# Patient Record
Sex: MALE | Race: WHITE | NOT HISPANIC OR LATINO | Employment: OTHER | URBAN - NONMETROPOLITAN AREA
[De-identification: names, ages, dates, MRNs, and addresses within clinical notes are randomized per-mention and may not be internally consistent; named-entity substitution may affect disease eponyms.]

---

## 2017-01-06 ENCOUNTER — OFFICE VISIT - GICH (OUTPATIENT)
Dept: ORTHOPEDICS | Facility: OTHER | Age: 52
End: 2017-01-06

## 2017-01-06 ENCOUNTER — HOSPITAL ENCOUNTER (OUTPATIENT)
Dept: RADIOLOGY | Facility: OTHER | Age: 52
End: 2017-01-06
Attending: ORTHOPAEDIC SURGERY

## 2017-01-06 ENCOUNTER — HISTORY (OUTPATIENT)
Dept: ORTHOPEDICS | Facility: OTHER | Age: 52
End: 2017-01-06

## 2017-01-06 DIAGNOSIS — S62.647D NONDISPLACED FRACTURE OF PROXIMAL PHALANX OF LEFT LITTLE FINGER WITH ROUTINE HEALING: ICD-10-CM

## 2017-01-06 DIAGNOSIS — S62.323D: ICD-10-CM

## 2017-04-10 ENCOUNTER — HISTORY (OUTPATIENT)
Dept: EMERGENCY MEDICINE | Facility: OTHER | Age: 52
End: 2017-04-10

## 2017-06-20 ENCOUNTER — OFFICE VISIT - GICH (OUTPATIENT)
Dept: FAMILY MEDICINE | Facility: OTHER | Age: 52
End: 2017-06-20

## 2017-06-20 ENCOUNTER — HISTORY (OUTPATIENT)
Dept: FAMILY MEDICINE | Facility: OTHER | Age: 52
End: 2017-06-20

## 2017-06-20 DIAGNOSIS — K52.9 NONINFECTIVE GASTROENTERITIS AND COLITIS: ICD-10-CM

## 2017-06-20 DIAGNOSIS — R91.1 SOLITARY PULMONARY NODULE: ICD-10-CM

## 2017-06-20 DIAGNOSIS — F33.1 MAJOR DEPRESSIVE DISORDER, RECURRENT, MODERATE (H): ICD-10-CM

## 2017-06-20 DIAGNOSIS — I10 ESSENTIAL (PRIMARY) HYPERTENSION: ICD-10-CM

## 2017-06-20 DIAGNOSIS — F41.9 ANXIETY DISORDER: ICD-10-CM

## 2017-06-20 DIAGNOSIS — F43.29 ADJUSTMENT DISORDER WITH OTHER SYMPTOMS: ICD-10-CM

## 2017-06-20 LAB
A/G RATIO - HISTORICAL: 1.5 (ref 1–2)
ABSOLUTE BASOPHILS - HISTORICAL: 0.1 THOU/CU MM
ABSOLUTE EOSINOPHILS - HISTORICAL: 0.1 THOU/CU MM
ABSOLUTE IMMATURE GRANULOCYTES(METAS,MYELOS,PROS) - HISTORICAL: 0 THOU/CU MM
ABSOLUTE LYMPHOCYTES - HISTORICAL: 3.3 THOU/CU MM (ref 0.9–2.9)
ABSOLUTE MONOCYTES - HISTORICAL: 0.8 THOU/CU MM
ABSOLUTE NEUTROPHILS - HISTORICAL: 5.5 THOU/CU MM (ref 1.7–7)
ALBUMIN SERPL-MCNC: 4.4 G/DL (ref 3.5–5.7)
ALP SERPL-CCNC: 101 IU/L (ref 34–104)
ALT (SGPT) - HISTORICAL: 29 IU/L (ref 7–52)
ANION GAP - HISTORICAL: 9 (ref 5–18)
AST SERPL-CCNC: 28 IU/L (ref 13–39)
BASOPHILS # BLD AUTO: 0.7 %
BILIRUB SERPL-MCNC: 0.5 MG/DL (ref 0.3–1)
BUN SERPL-MCNC: 9 MG/DL (ref 7–25)
BUN/CREAT RATIO - HISTORICAL: 10
CALCIUM SERPL-MCNC: 10 MG/DL (ref 8.6–10.3)
CHLORIDE SERPLBLD-SCNC: 98 MMOL/L (ref 98–107)
CO2 SERPL-SCNC: 27 MMOL/L (ref 21–31)
CREAT SERPL-MCNC: 0.88 MG/DL (ref 0.7–1.3)
EOSINOPHIL NFR BLD AUTO: 0.9 %
ERYTHROCYTE [DISTWIDTH] IN BLOOD BY AUTOMATED COUNT: 13.5 % (ref 11.5–15.5)
GFR IF NOT AFRICAN AMERICAN - HISTORICAL: >60 ML/MIN/1.73M2
GLOBULIN - HISTORICAL: 2.9 G/DL (ref 2–3.7)
GLUCOSE SERPL-MCNC: 104 MG/DL (ref 70–105)
HCT VFR BLD AUTO: 53.9 % (ref 37–53)
HEMOGLOBIN: 19.3 G/DL (ref 13.5–17.5)
IMMATURE GRANULOCYTES(METAS,MYELOS,PROS) - HISTORICAL: 0.3 %
LYMPHOCYTES NFR BLD AUTO: 33.8 % (ref 20–44)
MCH RBC QN AUTO: 35 PG (ref 26–34)
MCHC RBC AUTO-ENTMCNC: 35.8 G/DL (ref 32–36)
MCV RBC AUTO: 98 FL (ref 80–100)
MONOCYTES NFR BLD AUTO: 8.5 %
NEUTROPHILS NFR BLD AUTO: 55.8 % (ref 42–72)
PLATELET # BLD AUTO: 228 THOU/CU MM (ref 140–440)
PMV BLD: 9.2 FL (ref 6.5–11)
POTASSIUM SERPL-SCNC: 4.6 MMOL/L (ref 3.5–5.1)
PROT SERPL-MCNC: 7.3 G/DL (ref 6.4–8.9)
RED BLOOD COUNT - HISTORICAL: 5.52 MIL/CU MM (ref 4.3–5.9)
SODIUM SERPL-SCNC: 134 MMOL/L (ref 133–143)
WHITE BLOOD COUNT - HISTORICAL: 9.9 THOU/CU MM (ref 4.5–11)

## 2017-07-31 ENCOUNTER — HOSPITAL ENCOUNTER (OUTPATIENT)
Dept: RADIOLOGY | Facility: OTHER | Age: 52
End: 2017-07-31
Attending: FAMILY MEDICINE

## 2017-07-31 DIAGNOSIS — R91.1 SOLITARY PULMONARY NODULE: ICD-10-CM

## 2017-08-01 ENCOUNTER — AMBULATORY - GICH (OUTPATIENT)
Dept: FAMILY MEDICINE | Facility: OTHER | Age: 52
End: 2017-08-01

## 2017-08-07 ENCOUNTER — COMMUNICATION - GICH (OUTPATIENT)
Dept: FAMILY MEDICINE | Facility: OTHER | Age: 52
End: 2017-08-07

## 2017-12-27 NOTE — PROGRESS NOTES
Patient Information     Patient Name Lazaro Bunch [id number] Male 1965      Progress Notes by Xavi Gonzales MD at 2017  2:00 PM     Author:  Xavi Gonzales MD Service:  (none) Author Type:  Physician     Filed:  2017  2:59 PM Encounter Date:  2017 Status:  Signed     :  Xavi Gonzales MD (Physician)            SUBJECTIVE:  52 y.o. male who presents for medication refill. He has been off of Celexa and alprazolam for several months. He was doing well since December, and then he's noted a gradual slipping into more depression and anxiety. He was taking one alprazolam in the morning along with the Celexa. He is interested in getting back on that. He has not been suicidal. He's been under a considerable amount of stress because he is unable to work due to chronic diarrhea from his perforated ulcer surgery in , along with the depression and anxiety.    He continues to smoke. He said he smoking considerably less that he has in the past. Right now he is on no medications.    He has had some headaches which occur in the middle of the night almost every night. They're behind his left eye. Putting ice on it helps. He said it feels like headaches that he's had in the past.    Additional Review of Systems: see HPI:  No neurologic symptoms other than the headache, and no cardiopulmonary symptoms.    Past Medical History:     Diagnosis  Date     Closed displaced fracture of shaft of third metacarpal bone of left hand 2016     Closed nondisplaced fracture of proximal phalanx of left little finger 2016     Perforated chronic stomach ulcer (HC) 09        Current Outpatient Prescriptions       Medication  Sig Dispense Refill     ALPRAZolam (XANAX) 0.5 mg tablet One tablet po Q AM 30 tablet 5     citalopram (CELEXA) 20 mg tablet Take 1 tablet by mouth once daily. 30 tablet 5     lisinopril-hydrochlorothiazide (10-12.5 mg) tablet (PRINZIDE; ZESTORETIC)  Take 1 tablet by mouth once daily. 30 tablet 12     nicotine 7 mg/24 hr (NICODERM; HABITROL) 7 mg/24 hr patch Apply 1 Patch on dry, clean, hairless skin once daily. 14 Patch 2     No current facility-administered medications for this visit.      Medications have been reviewed by me and are current to the best of my knowledge and ability.      Allergies as of 06/20/2017      (No Known Allergies)        OBJECTIVE:  BP (!) 164/108  Pulse (!) 104  Ht 1.829 m (6')  Wt 108 kg (238 lb)  BMI 32.28 kg/m2  EXAM:  EXAM:  General Appearance: Pleasant, alert, appropriate appearance for age. No acute distress  Head Exam: Normal. Normocephalic, atraumatic.  Eye Exam: Normal external eye, conjunctiva, lids, cornea. RAMANDEEP.  Neck Exam: Supple, no masses or nodes., Good carotid pulses, no bruits heard  Thyroid Exam: No nodules or enlargement.  Chest/Respiratory Exam: Normal chest wall and respirations. Clear to auscultation.  Cardiovascular Exam: Regular rate and rhythm. S1, S2, no murmur, click, gallop, or rubs.  Repeat blood pressure by me was 166/112  Neurologic Exam: Intact and nonfocal  Psychiatric Exam: Alert and oriented, mildly depressed anxious affect. No delusions or hallucinations. Not suicidal.  Laboratory studies pending  CT report from 2012 reviewed    ASSESSMENT/PLAN:  Headache which is likely related to hypertension. He did have a lung nodule on his CT scan in 2012 and so this should be repeated as well. He was started on lisinopril/HCT for the blood pressure and we will review the CT scan once it is available.    Depression and anxiety-refill Celexa and alprazolam. Also laboratory studies done, he'll be notified of the results. Follow-up again in 2-3 weeks for blood pressure recheck and review of lab work. Forms are completed for public assistance with disability due to chronic diarrhea and his psychiatric status.  Xavi Gonzales MD ....................  6/20/2017   2:58 PM

## 2017-12-28 NOTE — TELEPHONE ENCOUNTER
Patient Information     Patient Name MRN Lazaro Thakur [id number] Male 1965      Telephone Encounter by Ava Georges at 2017  3:09 PM     Author:  Ava Georges Service:  (none) Author Type:  (none)     Filed:  2017  3:11 PM Encounter Date:  2017 Status:  Signed     :  Ava Georges            No answer, no voicemail.    Ava Georges ....................  2017   3:11 PM

## 2017-12-28 NOTE — TELEPHONE ENCOUNTER
Patient Information     Patient Name MRN Lazaro Thakur [id number] Male 1965      Telephone Encounter by Lisa Lira at 2017  9:44 AM     Author:  Lisa Lira Service:  (none) Author Type:  (none)     Filed:  2017  9:45 AM Encounter Date:  2017 Status:  Signed     :  Lisa Lira            Writer read back letter that was sent out with the results of CT scan  Lisa Lira LPN 2017 9:45 AM

## 2017-12-28 NOTE — PROGRESS NOTES
Patient Information     Patient Name MRN Sex     Lazaro Chauhan [id number] Male 1965      Progress Notes signed by Geoff Mendez MD at 2017 10:05 AM      Author:  Geoff Mendez MD Service:  (none) Author Type:  Physician     Filed:  2017 10:05 AM Encounter Date:  2017 Status:  Signed     :  Geoff Mendez MD (Physician)            -  2017        LAZARO CHAUHAN  [address] [place] [address]      RE:  LAZARO CHAUHAN  MR#:  [id number]  :  1965    Dear Mr. Chauhan:    The results of your CT scan of the chest showed no evidence of any nodules at all. You do have rib fractures as you know. There are no abnormalities otherwise. This is certainly good news and you do not need to have the CT repeated.     Sincerely yours,      GEOFF MENDEZ MD    WR/cp  Voice Job ID: [id number]  Text Job ID:  [id number]    cc:  2017  RE:  LAZARO CHAUHAN  MR#:  [id number]  Page 1

## 2017-12-28 NOTE — PROGRESS NOTES
Patient Information     Patient Name MRN Sex Lazaro Persaud [id number] Male 1965      Progress Notes signed by Geoff Mendez MD at 2017  8:32 AM      Author:  Geoff Mendez MD Service:  (none) Author Type:  Physician     Filed:  2017  8:32 AM Encounter Date:  2017 Status:  Signed     :  Geoff Mendez MD (Physician)            -  2017        LAZARO MAN  [address] [place] [address]      RE:  LAZARO MAN  MR#:  [id number]  :  1965    Dear Mr. Man:    The results of the lab work that we did today turned out looking relatively well with one exception. We did a blood profile which looked at your blood sugar, liver and kidney function and all your electrolytes and this was entirely normal. I am enclosing a copy of this for your review. I also did a complete blood count to see if there was any sign of infection or anemia and on that test your hemoglobin came back elevated at 19.3. A high hemoglobin is the opposite of anemia, and this can mean either that the blood is too concentrated, or that your oxygen levels are running low. Because of this, getting the CT scan of the lungs is important and then, as we discussed, we should see you back in followup in 2 or 3 weeks at which time we can check your blood oxygen level. At the same time, we will repeat the hemoglobin to see if it is still elevated.     This is not a real serious problem, but just needs to have the followup as noted. Again, the CT scan will be done and the followup with me in 2 to 3 weeks. We will see you at that time and recheck some of the labs.       Sincerely,      GEOFF MENDEZ MD    WR/sw  Voice Job ID: [id number]  Text Job ID:  [id number]    cc:Comprehensive metabolic panel done 2017.     2017  RE:  LAZARO MAN  MR#:  [id number]  Page 1

## 2017-12-30 NOTE — NURSING NOTE
Patient Information     Patient Name MRLazaro Valenzuela [id number] Male 1965      Nursing Note by Ava Georges at 2017  2:00 PM     Author:  Ava Georges Service:  (none) Author Type:  (none)     Filed:  2017  2:21 PM Encounter Date:  2017 Status:  Signed     :  Ava Georges            Patient presents to the clinic today for a med check.  Ava Georges ....................  2017   2:06 PM

## 2018-01-02 NOTE — PROGRESS NOTES
Patient Information     Patient Name MRN Sex Lazaro Persaud [id number] Male 1965      Progress Notes by Rufus Jorge DO at 2017 11:15 AM     Author:  Rufus Jorge DO Service:  (none) Author Type:  Physician     Filed:  2017 12:03 PM Encounter Date:  2017 Status:  Signed     :  Rufus Jorge DO (Physician)            PROGRESS NOTE    SUBJECTIVE:  Lazaro Man is here for evaluation in regards to his left hand. He is in very little discomfort except for the tightness due to having the cast.    OBJECTIVE:  Visit Vitals       /98     Pulse 88     Resp 18    There is no height or weight on file to calculate BMI.    General Appearance: Pleasant male in good appearance, mood and affect.  Alert and orientated times three ( time, date and location).    Skin: Abnormal, slight swelling.  Sensation is Normal in the fingertips.    Hand:  Thenar wasting: no  Hypothenar wasting: no  Sensation: Normal  Radial and ulnar blood flow:  Normal  Tinel's test negative  Minimal tenderness over the third metacarpal on the left.    Shoulder:  Motion: full    Elbow:  Flexion: Normal  Extension: Normal    Eyes: pupils are round.    Ears: Hearing: Intact    Heart: normal    Lungs: Coarse breath sounds.     Radiographic images from , ,  where independently reviewed and discussed with the patient.      Xray:    There is no increased fracture callus of the left third metacarpal shaft. Alignment is appropriate.  The fracture the base of the fifth metacarpal is healing well also with callus formation.    Exam: XR HAND 3 VIEWS LEFT  History: Closed displaced fracture of shaft of third metacarpal bone of left hand with routine healing, subsequent encounter  Technique: 3 views.  Findings: Comparison is made with the previous exam dated 2016.  There is an oblique fracture through the mid shaft of the third metacarpal with increasing callus formation at the  fracture site. There is been no significant change in position or alignment and no new abnormality is seen.    Impression: Healing third metacarpal fracture.  Electronically Signed By: Shabnam Mahan M.D. on 12/13/2016 9:00 AM    PROCEDURE: XR HAND 3 VIEWS LEFT  HISTORY: Acute left hand injury.  COMPARISON: None.  TECHNIQUE: 3 views of the left hand were obtained.  FINDINGS: There is an oblique slightly comminuted fracture of the shaft of the third metacarpal there is minimal posterior angulation and minimal less than 2 mm displacement only seen on the lateral view. No other fracture is seen. The joint spaces are preserved. No retained foreign body is seen. There are degenerative changes at the first CMC joint.  IMPRESSION: Acute third metacarpal fracture.  Electronically Signed By: Shay Jones on 11/25/2016 8:44 AM    IMPRESSION:  Left third metacarpal shaft fracture (DOI 11/19/16).  Noted new fracture base of the proximal phalanx left small finger.    PLAN:  Risks, benefits, conservative, surgical and alternatives to treatment where discussed and the patient would like to proceed with conservative measures.  He will begin working on range of motion exercises but no heavy lifting or gripping for at least another week..  He needs to rest, ice, and elevate his hand.  He will follow-up as needed.  All questions and concerns answered.  In regards to the new fracture that is noted this will be covered in the cast that is going to be applied.    Rufus Jorge D.O.  Orthopaedic Surgeon    [facility]  [address] [place] [address]  Phone [phone] (KNEE)  Fax [phone]    This document was created using computer generated templates and voice activated software.    12:00 PM 1/6/2017

## 2018-01-02 NOTE — NURSING NOTE
Patient Information     Patient Name MRN Sex Lazaro Persaud [id number] Male 1965      Nursing Note by Alida Rice at 2017 11:15 AM     Author:  Alida Rice Service:  (none) Author Type:  (none)     Filed:  2017 11:43 AM Encounter Date:  2017 Status:  Signed     :  Alida Rice            Pt presents for follow up. Left hand 2nd finger fx, doi     Alida Rice CMA 2017 11:43 AM

## 2018-01-08 ENCOUNTER — HISTORY (OUTPATIENT)
Dept: FAMILY MEDICINE | Facility: OTHER | Age: 53
End: 2018-01-08

## 2018-01-08 ENCOUNTER — AMBULATORY - GICH (OUTPATIENT)
Dept: FAMILY MEDICINE | Facility: OTHER | Age: 53
End: 2018-01-08

## 2018-01-08 ENCOUNTER — HOSPITAL ENCOUNTER (OUTPATIENT)
Dept: RADIOLOGY | Facility: OTHER | Age: 53
End: 2018-01-08
Attending: NURSE PRACTITIONER

## 2018-01-08 ENCOUNTER — OFFICE VISIT - GICH (OUTPATIENT)
Dept: FAMILY MEDICINE | Facility: OTHER | Age: 53
End: 2018-01-08

## 2018-01-08 DIAGNOSIS — S49.91XA UNSPECIFIED INJURY OF RIGHT SHOULDER AND UPPER ARM, INITIAL ENCOUNTER: ICD-10-CM

## 2018-01-08 DIAGNOSIS — S42.291A OTHER DISPLACED FRACTURE OF UPPER END OF RIGHT HUMERUS, INITIAL ENCOUNTER FOR CLOSED FRACTURE: ICD-10-CM

## 2018-01-11 ENCOUNTER — AMBULATORY - GICH (OUTPATIENT)
Dept: OTHER | Facility: OTHER | Age: 53
End: 2018-01-11

## 2018-01-11 ENCOUNTER — HISTORY (OUTPATIENT)
Dept: OTHER | Facility: OTHER | Age: 53
End: 2018-01-11

## 2018-01-12 ENCOUNTER — HOSPITAL ENCOUNTER (OUTPATIENT)
Dept: RADIOLOGY | Facility: OTHER | Age: 53
End: 2018-01-12
Attending: NURSE PRACTITIONER

## 2018-01-12 DIAGNOSIS — S42.291A OTHER DISPLACED FRACTURE OF UPPER END OF RIGHT HUMERUS, INITIAL ENCOUNTER FOR CLOSED FRACTURE: ICD-10-CM

## 2018-01-12 DIAGNOSIS — S49.91XA UNSPECIFIED INJURY OF RIGHT SHOULDER AND UPPER ARM, INITIAL ENCOUNTER: ICD-10-CM

## 2018-01-15 ENCOUNTER — OFFICE VISIT - GICH (OUTPATIENT)
Dept: ORTHOPEDICS | Facility: OTHER | Age: 53
End: 2018-01-15

## 2018-01-15 ENCOUNTER — HISTORY (OUTPATIENT)
Dept: ORTHOPEDICS | Facility: OTHER | Age: 53
End: 2018-01-15

## 2018-01-15 DIAGNOSIS — S42.291D OTHER DISPLACED FRACTURE OF UPPER END OF RIGHT HUMERUS, SUBSEQUENT ENCOUNTER FOR FRACTURE WITH ROUTINE HEALING: ICD-10-CM

## 2018-01-15 DIAGNOSIS — S42.291A OTHER DISPLACED FRACTURE OF UPPER END OF RIGHT HUMERUS, INITIAL ENCOUNTER FOR CLOSED FRACTURE: ICD-10-CM

## 2018-01-15 DIAGNOSIS — S49.91XA UNSPECIFIED INJURY OF RIGHT SHOULDER AND UPPER ARM, INITIAL ENCOUNTER: ICD-10-CM

## 2018-01-18 ENCOUNTER — AMBULATORY - GICH (OUTPATIENT)
Dept: ORTHOPEDICS | Facility: OTHER | Age: 53
End: 2018-01-18

## 2018-01-18 DIAGNOSIS — S42.291D OTHER DISPLACED FRACTURE OF UPPER END OF RIGHT HUMERUS, SUBSEQUENT ENCOUNTER FOR FRACTURE WITH ROUTINE HEALING: ICD-10-CM

## 2018-01-25 VITALS — DIASTOLIC BLOOD PRESSURE: 98 MMHG | HEART RATE: 88 BPM | SYSTOLIC BLOOD PRESSURE: 140 MMHG | RESPIRATION RATE: 18 BRPM

## 2018-01-25 VITALS
HEART RATE: 104 BPM | DIASTOLIC BLOOD PRESSURE: 108 MMHG | SYSTOLIC BLOOD PRESSURE: 164 MMHG | WEIGHT: 238 LBS | HEIGHT: 72 IN | BODY MASS INDEX: 32.23 KG/M2

## 2018-01-31 ENCOUNTER — DOCUMENTATION ONLY (OUTPATIENT)
Dept: FAMILY MEDICINE | Facility: OTHER | Age: 53
End: 2018-01-31

## 2018-01-31 PROBLEM — Z80.0 FAMILY HISTORY OF MALIGNANT NEOPLASM OF GASTROINTESTINAL TRACT: Status: ACTIVE | Noted: 2018-01-31

## 2018-01-31 PROBLEM — S42.201D CLOSED FRACTURE OF PROXIMAL END OF RIGHT HUMERUS WITH ROUTINE HEALING: Status: ACTIVE | Noted: 2018-01-11

## 2018-01-31 PROBLEM — Z87.11 HISTORY OF PEPTIC ULCER DISEASE: Status: ACTIVE | Noted: 2018-01-31

## 2018-01-31 PROBLEM — I10 HYPERTENSION: Status: ACTIVE | Noted: 2017-06-20

## 2018-01-31 RX ORDER — ALPRAZOLAM 0.5 MG
1 TABLET ORAL EVERY MORNING
COMMUNITY
Start: 2017-06-20 | End: 2018-05-03

## 2018-01-31 RX ORDER — LISINOPRIL/HYDROCHLOROTHIAZIDE 10-12.5 MG
1 TABLET ORAL DAILY
COMMUNITY
Start: 2017-06-20 | End: 2018-08-14

## 2018-01-31 RX ORDER — OXYCODONE AND ACETAMINOPHEN 5; 325 MG/1; MG/1
1 TABLET ORAL
COMMUNITY
Start: 2018-01-08 | End: 2018-09-06

## 2018-01-31 RX ORDER — CITALOPRAM HYDROBROMIDE 20 MG/1
20 TABLET ORAL DAILY
COMMUNITY
Start: 2017-06-20 | End: 2018-08-14

## 2018-02-09 VITALS
DIASTOLIC BLOOD PRESSURE: 88 MMHG | SYSTOLIC BLOOD PRESSURE: 138 MMHG | HEIGHT: 72 IN | HEART RATE: 60 BPM | WEIGHT: 231 LBS | BODY MASS INDEX: 31.29 KG/M2

## 2018-02-09 VITALS
TEMPERATURE: 98.3 F | DIASTOLIC BLOOD PRESSURE: 82 MMHG | BODY MASS INDEX: 31.34 KG/M2 | HEIGHT: 72 IN | WEIGHT: 231.38 LBS | HEART RATE: 108 BPM | SYSTOLIC BLOOD PRESSURE: 144 MMHG

## 2018-02-12 NOTE — NURSING NOTE
Patient Information     Patient Name MRN Sex Lazaro Persaud [id number] Male 1965      Nursing Note by Mica Ruby at 2018  2:00 PM     Author:  Mica Ruby Service:  (none) Author Type:  (none)     Filed:  2018  2:27 PM Encounter Date:  2018 Status:  Signed     :  Mica Ruby            Patient presents to the clinic for right shoulder pain x 4 days. Patient reports falling on ice on Friday and landing on his right side. Patient reports the shoulder increasing in pain and has iced it for relief.  Mica HUGO, KT..AAMA.....2018..2:16 PM

## 2018-02-12 NOTE — PATIENT INSTRUCTIONS
Patient Information     Patient Name MRN Lazaro Thakur [id number] Male 1965      Patient Instructions by Erin Mackenzie NP at 2018  2:00 PM     Author:  Erin Mackenzie NP Service:  (none) Author Type:  PHYS- Nurse Practitioner     Filed:  2018  3:34 PM Encounter Date:  2018 Status:  Signed     :  Erin Mackenzie NP (PHYS- Nurse Practitioner)            Rest the arm, avoid any activity which causes pain.    Apply cold packs to the affected area for 15-20 minutes, 4 times a day. A bag of frozen corn or peas often works well as a cold pack. A cold pack is usually the best treatment for the 1st 2 days after an injury. After 48 hours, apply heat or ice, whichever gives relief.    Shoulder immobilizer on at all times.     Elevate the injured area as much as you are able. If you can get this higher than the heart, this will help minimize pain and swelling.    Also, Take ibuprofen (Advil, Motrin) or naproxen (Aleve), or a similar prescription medication. Use regularly for the first 7-10 days. Later, take as needed for pain, swelling or stiffness. Take this type of medication with food to minimize any stomach irritation. Tylenol may also be taken to help ease the pain.    Call or return to clinic as needed if your pain becomes significantly worse, or fails to improve as anticipated despite following the above recommendations.

## 2018-02-12 NOTE — PROGRESS NOTES
Patient Information     Patient Name MRN Sex Lazaro Persaud [id number] Male 1965      Progress Notes by Erin Mackenzie NP at 2018  2:00 PM     Author:  Erin Mackenzie NP Service:  (none) Author Type:  PHYS- Nurse Practitioner     Filed:  2018  4:08 PM Encounter Date:  2018 Status:  Signed     :  Erin Mackenzie NP (PHYS- Nurse Practitioner)            Nursing Notes:   Mica Ruby  2018  2:27 PM  Signed  Patient presents to the clinic for right shoulder pain x 4 days. Patient reports falling on ice on Friday and landing on his right side. Patient reports the shoulder increasing in pain and has iced it for relief.  Miac HUGO CMA..AAMA.....2018..2:16 PM    SUBJECTIVE:    Lazaro Man is a 52 y.o. male who presents for shoulder injury    Shoulder Injury    The incident occurred at home. The right shoulder is affected. Incident onset: DOI 18. The injury mechanism was a fall (Slipped on ice and fell onto RT side, hand was in pocket when he fell. ). The quality of the pain is described as aching, shooting and stabbing. The pain radiates to the right arm. The pain is at a severity of 6/10. Associated symptoms include tingling. Pertinent negatives include no numbness. Associated symptoms comments: There was numbness, this is better now. . The symptoms are aggravated by movement and palpation. He has tried ice for the symptoms. The treatment provided no relief.       Current Outpatient Prescriptions on File Prior to Visit       Medication  Sig Dispense Refill     ALPRAZolam (XANAX) 0.5 mg tablet One tablet po Q AM 30 tablet 5     citalopram (CELEXA) 20 mg tablet Take 1 tablet by mouth once daily. 30 tablet 5     lisinopril-hydrochlorothiazide (10-12.5 mg) tablet (PRINZIDE; ZESTORETIC) Take 1 tablet by mouth once daily. 30 tablet 12     nicotine 7 mg/24 hr (NICODERM; HABITROL) 7 mg/24 hr patch Apply 1 Patch on dry, clean, hairless skin once daily. 14 Patch 2     No  current facility-administered medications on file prior to visit.        REVIEW OF SYSTEMS:  Review of Systems   Neurological: Positive for tingling. Negative for numbness.       OBJECTIVE:  /82 (Cuff Site: Left Arm, Position: Sitting, Cuff Size: Adult Large)  Pulse (!) 108  Temp 98.3  F (36.8  C) (Tympanic)  Ht 1.829 m (6')  Wt 105 kg (231 lb 6 oz)  BMI 31.38 kg/m2    EXAM:   Physical Exam   Constitutional: He is oriented to person, place, and time and well-developed, well-nourished, and in no distress.   HENT:   Head: Normocephalic and atraumatic.   Eyes: Conjunctivae are normal.   Neck: Neck supple.   Cardiovascular: Normal rate.    Pulmonary/Chest: Effort normal. No respiratory distress.   Musculoskeletal:        Right shoulder: He exhibits decreased range of motion, tenderness, pain, spasm and decreased strength. He exhibits no effusion, no crepitus and normal pulse.        Right elbow: Normal.       Right wrist: Normal.        Cervical back: Normal.        Right upper arm: He exhibits tenderness. He exhibits no swelling and no edema.   Holds RT arm into his body. Refuses to do ROM, states he can't d/t pain.    Neurological: He is alert and oriented to person, place, and time. Gait normal.   Skin: Skin is warm and dry. No rash noted.   Psychiatric: Mood and affect normal.   Nursing note and vitals reviewed.    Completed Shoulder xray.  I personally reviewed the xray. There was a commuted proximal humeral head fracture noted upon initial read of xray.  Final read pending by radiology.    ASSESSMENT/PLAN:    ICD-10-CM    1. Injury of right shoulder, initial encounter S49.91XA ketorolac 60 mg injection (TORADOL)      AMB CONSULT TO ORTHOPEDICS (NON-SPINE)      CT SHOULDER RIGHT WO      CANCELED: XR SHOULDER 4 VIEWS RIGHT   2. Humeral head fracture, right, closed, initial encounter S42.291A AMB CONSULT TO ORTHOPEDICS (NON-SPINE)      oxyCODONE-acetaminophen, 5-325 mg, (PERCOCET) 5-325 mg per tablet       SHOULDER IMMOBILIZER      CT SHOULDER RIGHT WO        Plan:  1525: Spoke with Ortho on how to treat this. Was recommended to get a CT scan for associated injury with this fracture type. Also Shoulder immobilizer recommended.   For pain he will use NSAIDs, tylenol, heat, ice, and a rx for percocet is written.    I have prescribed a narcotic:    1) Please make sure you read the handout the pharmacist gives you on this drug.   2) Any unused narcotics need to be destroyed or taken to the police department.   3) Do not save unused narcotics   4) Do not give your pills to other people.   5) Do not crush, snort or otherwise alter the drug  6) Do not use in combination with other drugs such as benadryl, alcohol, or benzodiopines.   7) If you have any questions on your medication please call your pharmacist or PCP office.   8) This will not be refilled.     I explained my diagnostic considerations and recommendations to the patient, who voiced understanding and agreement with the treatment plan. All questions were answered. We discussed potential side effects of any prescribed or recommended therapies, as well as expectations for response to treatments. He was advised to contact our office if there is no improvement or worsening of conditions or symptoms.  If s/s worsen or persist, patient will either come back or follow up with PCP.       ANNELIESE SOLANO NP ....................  1/8/2018   4:07 PM

## 2018-02-13 NOTE — PROGRESS NOTES
Patient Information     Patient Name MRN Sex Lazaro Persaud [id number] Male 1965      Progress Notes by Mateo Zaman R.T. (ARRT) at 2018  3:44 PM     Author:  [facility]T. [facility] Service:  (none) Author Type:  [employer] - Registered Radiologic Technologist     Filed:  2018  3:45 PM Date of Service:  2018  3:44 PM Status:  Signed     :  Mateo Zaman R.T. (ARRT) ([employer] - Registered Radiologic Technologist)                       1.  Do you have dizziness or vertigo?    no                    2.  Do you need help standing or walking?   yes                 3.  Have you fallen within the last 6 months?    no           4.  Has the patient been fasting?      no       If any risks are marked Yes, the following interventions are utilized:    Do not leave patient unattended     Assist patient in the dressing room and bathroom    Have ambulatory aides available throughout procedure    Involve patient s family if available

## 2018-02-13 NOTE — PROGRESS NOTES
Patient Information     Patient Name MRN Sex Lazaro Persaud [id number] Male 1965      Progress Notes by Rufus Jorge DO at 1/15/2018 12:45 PM     Author:  Ruufs Jorge DO Service:  (none) Author Type:  Physician     Filed:  1/15/2018 12:48 PM Encounter Date:  1/15/2018 Status:  Signed     :  Rufus Jorge DO (Physician)            PROGRESS NOTE    SUBJECTIVE:  Lazaro Man is here for evaluation in regards to his right proximal humerus. Patient slipped and fell and hit his hands in his pockets at home on 18. He landed hard onto his right shoulder and had immediate pain. He was seen in the rapid clinic later on it was noted to have a significantly fractured right proximal humerus. He was placed into a white immobilizer and set up to follow-up with orthopedics. He also underwent a CT exam to make sure there wasn't any glenoid fractures. He states the pain has dramatically decreased since it first happened. His been following the restrictions and instructions for the immobilizer.    REVIEW OF SYSTEMS:  The review of systems as documented in the HPI and on the intake questionnaire completed by the patient on 1/15/2018 it has been reviewed by myself and the pertinent positives and negatives addressed.  The remainder of the complete review of systems was non-contributory.    OBJECTIVE:  /88 (Cuff Site: Right Arm, Position: Sitting, Cuff Size: Adult Regular)  Pulse 60  Ht 1.829 m (6')  Wt 104.8 kg (231 lb)  BMI 31.33 kg/m2 Body mass index is 31.33 kg/(m^2).    General Appearance: Pleasant male in good appearance, mood and affect.  Alert and orientated times three ( time, date and location).    Skin: Abnormal, bruising and swelling noted right proximal humerus.    Shoulder:  Motion: Not tested today due to recent fracture    Elbow:  Flexion: Normal.  Extension: Normal.    Hand:  Thenar wasting: no.  Hypothenar wasting: no.  Sensation: Normal.  Radial and  ulnar blood flow:  Normal.    Eyes: Pupils are round and he wears glasses..    Ears: Hearing: Intact.    Heart: Good blood flow into his hands pulses are regular..    Lungs: Coarse breath sounds.     Radiographic images from 1/8, 1/12 where independently reviewed and discussed with the patient.     X-rays demonstrate a fracture comminuted in nature right proximal humerus with slight displacement.    Exam: XR SHOULDER 3 VIEWS RIGHT  History: Injury of right shoulder, initial encounter  Technique: 3 views.  Findings: Comparison is made with 10/08/2009.  There is an acute comminuted fracture through the proximal humerus. Major fracture line runs transversely through the neck but there is a more vertically oriented fracture laterally. Multiple small bony fragments are seen. There is slight medial displacement of the shaft of the humerus relative to the head and neck.  There is no dislocation of the humeral head.    Impression: Comminuted proximal humerus fracture.  Electronically Signed By: Shabnam Mahan M.D. on 1/8/2018 3:30 PM    CT:    CT SHOULDER RIGHT WO  HISTORY: Injury of right shoulder, initial encounter.  TECHNIQUE: Helical noncontrast CT images of the right shoulder.  COMPARISON: Radiographs on 01/08/2018.  FINDINGS:  The previously described fracture of the proximal right humeral neck is redemonstrated. There is extensive comminution with numerous fracture fragments. Much of the greater tuberosity comprises a major fracture fragment. There is impaction of the humeral shaft up to 6 mm. Medial offset of the primary shaft relative to the primary humeral head fragment measures 1.2 cm.  The glenohumeral joint remains congruent. There is a hemorrhagic right shoulder effusion. The acromioclavicular joint remains congruent. No scapular fracture is present. No acute right rib fracture or pneumothorax is seen.  IMPRESSION:  Comminuted fracture of the proximal right humerus, described in detail above, not  significantly changed in configuration when compared to the prior study dated 01/08/2018.  Electronically Signed By: Shay Jones on 1/12/2018 4:22 PM    ASSESSMENT:    Right comminuted proximal humerus fracture (DOI 1/4/18).    PLAN:    I discussed conservative and surgical options with the patient and at this time we'll continue conservative measures.  He was instructed on the use of his white immobilizer.  He'll be able to utilize his elbow as instructed here today.  He needs to elevate his hand at least 5 minutes every hour to try to get the swelling out of it.  He understands the risks of this type of fracture but also called the shoulder is very forgiving and that this should heal up appropriately.  He can utilize medications as needed. If he needs more narcotics or other medications he will contact his primary.  Questions and concerns answered.  Follow-up in 2 weeks with x-ray first AP and scapular wide view only no axillary films due to the fracture.    Rufus Jorge D.O.  Orthopaedic Surgeon    [facility]  [address] [place] [address]  Phone [phone] (KNEE)  Fax [phone]    This document was created using computer generated templates and voice activated software.    12:42 PM 1/15/2018

## 2018-02-13 NOTE — PROGRESS NOTES
Patient Information     Patient Name MRN Lazaro Thakur [id number] Male 1965      Progress Notes by Mateo Zaman R.T. (ARRT) at 2018  3:44 PM     Author:  Mateo Zaman R.T. (ARRT) Service:  (none) Author Type:  [employer] - Registered Radiologic Technologist     Filed:  2018  3:44 PM Date of Service:  2018  3:44 PM Status:  Signed     :  Mateo Zaman R.T. (ARRT) ([employer] - Registered Radiologic Technologist)            Falls Risk Criteria:    Age 65 and older or under age 4        Sensory deficits    Poor vision    Use of ambulatory aides    Impaired judgment    Unable to walk independently    Meets High Risk criteria for falls:  no

## 2018-02-13 NOTE — NURSING NOTE
Patient Information     Patient Name MRN Lazaro Thakur [id number] Male 1965      Nursing Note by Gosselin, Norma J at 1/15/2018 12:45 PM     Author:  Gosselin, Norma J Service:  (none) Author Type:  (none)     Filed:  1/15/2018 12:29 PM Encounter Date:  1/15/2018 Status:  Signed     :  Gosselin, Norma J            Consult right shoulder [id number]  Norma J Gosselin LPN....................  1/15/2018   12:29 PM

## 2018-05-03 DIAGNOSIS — F41.9 ANXIETY: Primary | ICD-10-CM

## 2018-05-03 NOTE — LETTER
May 8, 2018      Lazaro Man  [address] [facility] [address]        Dear Lazaro,       This letter is to remind you that you are due for your annual exam and medication management appointment with Xavi Gonzales. Your last comprehensive visit was more than 12 months ago.    Additional prescription refills require you to complete this appointment.    Please call the clinic at [phone] to schedule your appointment.    If you should require additional refills before your scheduled appointment, please contact your pharmacy and we will refill your medication until the date of your appointment.    If you are no longer seeing Xavi Gonzales for primary care, please call to let us know. Doing so will remove you from our call/contact list.      Thank you for choosing [facility] and [facility] for your health care needs.    Sincerely,    Refill GERMAN  [facility]

## 2018-05-08 RX ORDER — ALPRAZOLAM 0.5 MG
TABLET ORAL
Qty: 30 TABLET | Refills: 0 | Status: SHIPPED | OUTPATIENT
Start: 2018-05-08 | End: 2018-06-18

## 2018-05-08 NOTE — TELEPHONE ENCOUNTER
[facility] pharmacy and pt request a Rx refill for alprazolam (Xanax).  No Rx protocol completed.  Patient is due for medication management appointment. Letter sent for reminder to patient. Will route to PCP for review and consideration of refill. Unable to complete prescription refill per RN Medication Refill Policy.................... Barbara Anderson ....................  5/8/2018   2:58 PM

## 2018-06-15 ENCOUNTER — TELEPHONE (OUTPATIENT)
Dept: FAMILY MEDICINE | Facility: OTHER | Age: 53
End: 2018-06-15

## 2018-06-15 DIAGNOSIS — F41.9 ANXIETY: Primary | ICD-10-CM

## 2018-06-15 NOTE — TELEPHONE ENCOUNTER
Patient has questions on forms he needs done and a prescription.    Carolyn Robertson on 6/15/2018 at 3:57 PM

## 2018-06-15 NOTE — TELEPHONE ENCOUNTER
After birth date was verified, spoke with patient. He stated that he received a letter that he is due for a yearly exam. States that his xanax can't be renewed until Dr. Gonzales see's him. Patient wondering if he can get in sooner, states he would like to be put on a call wait list.     Please call patient on Monday if Dr. Gonzales will work him in.        Michael Mchugh LPN 06/15/18 4:23 PM

## 2018-06-18 RX ORDER — ALPRAZOLAM 0.5 MG
0.5 TABLET ORAL EVERY MORNING
Qty: 30 TABLET | Refills: 1 | Status: SHIPPED | OUTPATIENT
Start: 2018-06-18 | End: 2018-08-20

## 2018-06-18 NOTE — TELEPHONE ENCOUNTER
A 30 day supply was given with 1 refill.  He needs to be seen prior to any further.  GEOFF MENDEZ MD on 6/18/2018 at 10:48 AM

## 2018-07-09 ENCOUNTER — TELEPHONE (OUTPATIENT)
Dept: FAMILY MEDICINE | Facility: OTHER | Age: 53
End: 2018-07-09

## 2018-07-09 NOTE — TELEPHONE ENCOUNTER
Dr. Gonzales, Did you complete paperwork from this patient approx. 1 week ago?  Phyllis De La Vega ....................  7/9/2018   11:41 AM

## 2018-08-14 ENCOUNTER — TELEPHONE (OUTPATIENT)
Dept: FAMILY MEDICINE | Facility: OTHER | Age: 53
End: 2018-08-14

## 2018-08-14 DIAGNOSIS — F41.9 ANXIETY: ICD-10-CM

## 2018-08-14 DIAGNOSIS — I10 BENIGN ESSENTIAL HYPERTENSION: Primary | ICD-10-CM

## 2018-08-14 DIAGNOSIS — F43.29 STRESS AND ADJUSTMENT REACTION: ICD-10-CM

## 2018-08-17 PROBLEM — I10 BENIGN ESSENTIAL HYPERTENSION: Status: ACTIVE | Noted: 2018-08-17

## 2018-08-17 PROBLEM — I10 HYPERTENSION: Status: RESOLVED | Noted: 2017-06-20 | Resolved: 2018-08-17

## 2018-08-17 RX ORDER — CITALOPRAM HYDROBROMIDE 20 MG/1
TABLET ORAL
Qty: 30 TABLET | Refills: 0 | Status: SHIPPED | OUTPATIENT
Start: 2018-08-17 | End: 2018-08-20

## 2018-08-17 RX ORDER — LISINOPRIL/HYDROCHLOROTHIAZIDE 10-12.5 MG
TABLET ORAL
Qty: 30 TABLET | Refills: 0 | Status: SHIPPED | OUTPATIENT
Start: 2018-08-17 | End: 2018-08-20

## 2018-08-17 NOTE — TELEPHONE ENCOUNTER
PLEASE REVIEW AND ADVISE: THANK YOU!    Per refill encounter, dated 5/3/18, Patient is overdue for medication management appointment. Reminder letter was sent. Patient is now overdue for annual exam and labs. Pt had appointment 7/23/18 and was cancelled by clinic.     Called and spoke to Patient after verifying last name and date of birth. He agreed to make an annual exam appointment and was made aware that Dr. Gonzales's schedule is booked out 1-2 months.     Pt also wondering about his Xanax. He has been told in the past couple of months, that he needs to be seen prior to any further refills. Pt requesting a call back once Dr. Gonzales returns on Monday as to which provider he recommends he see for this, in light of  limited availability.     In the mean time, Lisinopril-hydrochlorothiazide and Citalopram filled for 30 day zachary refill. CMP pending MD co-sign. If approved, please notify Patient.    Barbara De Jesus RN .............. 8/17/2018  9:27 AM

## 2018-08-20 ENCOUNTER — TELEPHONE (OUTPATIENT)
Dept: FAMILY MEDICINE | Facility: OTHER | Age: 53
End: 2018-08-20

## 2018-08-20 PROBLEM — F41.9 ANXIETY: Status: ACTIVE | Noted: 2018-08-20

## 2018-08-20 PROBLEM — F43.29 STRESS AND ADJUSTMENT REACTION: Status: ACTIVE | Noted: 2018-08-20

## 2018-08-20 RX ORDER — LISINOPRIL/HYDROCHLOROTHIAZIDE 10-12.5 MG
1 TABLET ORAL DAILY
Qty: 30 TABLET | Refills: 1 | Status: SHIPPED | OUTPATIENT
Start: 2018-08-20 | End: 2018-09-06

## 2018-08-20 RX ORDER — ALPRAZOLAM 0.5 MG
0.5 TABLET ORAL EVERY MORNING
Qty: 30 TABLET | Refills: 1 | Status: SHIPPED | OUTPATIENT
Start: 2018-08-20 | End: 2018-09-06

## 2018-08-20 RX ORDER — CITALOPRAM HYDROBROMIDE 20 MG/1
20 TABLET ORAL DAILY
Qty: 30 TABLET | Refills: 1 | Status: SHIPPED | OUTPATIENT
Start: 2018-08-20 | End: 2018-09-06

## 2018-08-20 NOTE — TELEPHONE ENCOUNTER
"All meds can be refilled for 30 days with 1 refill, which should give him time to schedule an appointment.  Labs have been ordered, and he can make a \"lab only\" appointment and get these done at any time.  GEOFF MENDEZ MD on 8/20/2018 at 8:52 AM    "

## 2018-08-20 NOTE — TELEPHONE ENCOUNTER
Called and spoke to Patient after verifying last name and date of birth. Patient notified of this information. Pt states he would prefer to get an appointment with Dr. Gonzales. Patient transferred to scheduling line to take Dr. Gonzales's soonest appointment and schedule lab only appointment. Pt interested in being called if there are any cancellations. This information given to .    Next 5 appointments (look out 90 days)     Oct 04, 2018 10:45 AM CDT   Office Visit with Xavi Gonzales MD   [facility] ([facility])    [address]  [place] [address]   [phone]                Barbara De Jesus RN .............. 8/20/2018  9:25 AM

## 2018-08-20 NOTE — TELEPHONE ENCOUNTER
WLR - Patient called and scheduled an annual medication review appointment in October.  He requesting an earlier appointment if possible.  Please call patient with any questions.    Emilie Romero

## 2018-08-31 DIAGNOSIS — I10 BENIGN ESSENTIAL HYPERTENSION: ICD-10-CM

## 2018-08-31 LAB
ALBUMIN SERPL-MCNC: 4.2 G/DL (ref 3.5–5.7)
ALBUMIN UR-MCNC: NEGATIVE MG/DL
ALP SERPL-CCNC: 83 U/L (ref 34–104)
ALT SERPL W P-5'-P-CCNC: 18 U/L (ref 7–52)
ANION GAP SERPL CALCULATED.3IONS-SCNC: 7 MMOL/L (ref 3–14)
APPEARANCE UR: CLEAR
AST SERPL W P-5'-P-CCNC: 17 U/L (ref 13–39)
BASOPHILS # BLD AUTO: 0.1 10E9/L (ref 0–0.2)
BASOPHILS NFR BLD AUTO: 0.6 %
BILIRUB SERPL-MCNC: 0.3 MG/DL (ref 0.3–1)
BILIRUB UR QL STRIP: NEGATIVE
BUN SERPL-MCNC: 10 MG/DL (ref 7–25)
CALCIUM SERPL-MCNC: 9.7 MG/DL (ref 8.6–10.3)
CHLORIDE SERPL-SCNC: 104 MMOL/L (ref 98–107)
CHOLEST SERPL-MCNC: 176 MG/DL
CO2 SERPL-SCNC: 29 MMOL/L (ref 21–31)
COLOR UR AUTO: YELLOW
CREAT SERPL-MCNC: 0.94 MG/DL (ref 0.7–1.3)
DIFFERENTIAL METHOD BLD: ABNORMAL
EOSINOPHIL # BLD AUTO: 0.3 10E9/L (ref 0–0.7)
EOSINOPHIL NFR BLD AUTO: 3 %
ERYTHROCYTE [DISTWIDTH] IN BLOOD BY AUTOMATED COUNT: 12.2 % (ref 10–15)
GFR SERPL CREATININE-BSD FRML MDRD: 83 ML/MIN/1.7M2
GLUCOSE SERPL-MCNC: 122 MG/DL (ref 70–105)
GLUCOSE UR STRIP-MCNC: NEGATIVE MG/DL
HCT VFR BLD AUTO: 48.1 % (ref 40–53)
HDLC SERPL-MCNC: 49 MG/DL (ref 23–92)
HGB BLD-MCNC: 16.8 G/DL (ref 13.3–17.7)
HGB UR QL STRIP: NEGATIVE
IMM GRANULOCYTES # BLD: 0 10E9/L (ref 0–0.4)
IMM GRANULOCYTES NFR BLD: 0.2 %
KETONES UR STRIP-MCNC: NEGATIVE MG/DL
LDLC SERPL CALC-MCNC: 74 MG/DL
LEUKOCYTE ESTERASE UR QL STRIP: NEGATIVE
LYMPHOCYTES # BLD AUTO: 3.1 10E9/L (ref 0.8–5.3)
LYMPHOCYTES NFR BLD AUTO: 31.2 %
MCH RBC QN AUTO: 34.6 PG (ref 26.5–33)
MCHC RBC AUTO-ENTMCNC: 34.9 G/DL (ref 31.5–36.5)
MCV RBC AUTO: 99 FL (ref 78–100)
MONOCYTES # BLD AUTO: 0.6 10E9/L (ref 0–1.3)
MONOCYTES NFR BLD AUTO: 5.6 %
NEUTROPHILS # BLD AUTO: 5.9 10E9/L (ref 1.6–8.3)
NEUTROPHILS NFR BLD AUTO: 59.4 %
NITRATE UR QL: NEGATIVE
NONHDLC SERPL-MCNC: 127 MG/DL
PH UR STRIP: 6 PH (ref 5–9)
PLATELET # BLD AUTO: 252 10E9/L (ref 150–450)
POTASSIUM SERPL-SCNC: 4.2 MMOL/L (ref 3.5–5.1)
PROT SERPL-MCNC: 7 G/DL (ref 6.4–8.9)
RBC # BLD AUTO: 4.86 10E12/L (ref 4.4–5.9)
SODIUM SERPL-SCNC: 140 MMOL/L (ref 134–144)
SOURCE: NORMAL
SP GR UR STRIP: 1.01 (ref 1–1.03)
TRIGL SERPL-MCNC: 264 MG/DL
UROBILINOGEN UR STRIP-ACNC: 0.2 EU/DL (ref 0.2–1)
WBC # BLD AUTO: 10 10E9/L (ref 4–11)

## 2018-08-31 PROCEDURE — 80061 LIPID PANEL: CPT | Performed by: FAMILY MEDICINE

## 2018-08-31 PROCEDURE — 81003 URINALYSIS AUTO W/O SCOPE: CPT | Performed by: FAMILY MEDICINE

## 2018-08-31 PROCEDURE — 80053 COMPREHEN METABOLIC PANEL: CPT | Performed by: FAMILY MEDICINE

## 2018-08-31 PROCEDURE — 85025 COMPLETE CBC W/AUTO DIFF WBC: CPT | Performed by: FAMILY MEDICINE

## 2018-08-31 PROCEDURE — 36415 COLL VENOUS BLD VENIPUNCTURE: CPT | Performed by: FAMILY MEDICINE

## 2018-09-06 ENCOUNTER — OFFICE VISIT (OUTPATIENT)
Dept: FAMILY MEDICINE | Facility: OTHER | Age: 53
End: 2018-09-06
Attending: FAMILY MEDICINE
Payer: COMMERCIAL

## 2018-09-06 VITALS
WEIGHT: 222.4 LBS | SYSTOLIC BLOOD PRESSURE: 120 MMHG | RESPIRATION RATE: 20 BRPM | HEART RATE: 92 BPM | BODY MASS INDEX: 30.12 KG/M2 | TEMPERATURE: 98.2 F | DIASTOLIC BLOOD PRESSURE: 84 MMHG | HEIGHT: 72 IN

## 2018-09-06 DIAGNOSIS — F41.9 ANXIETY: ICD-10-CM

## 2018-09-06 DIAGNOSIS — F17.210 CIGARETTE NICOTINE DEPENDENCE WITHOUT COMPLICATION: Primary | ICD-10-CM

## 2018-09-06 DIAGNOSIS — R73.9 HYPERGLYCEMIA: ICD-10-CM

## 2018-09-06 DIAGNOSIS — F43.29 STRESS AND ADJUSTMENT REACTION: ICD-10-CM

## 2018-09-06 DIAGNOSIS — I10 BENIGN ESSENTIAL HYPERTENSION: ICD-10-CM

## 2018-09-06 LAB — HBA1C MFR BLD: 6.1 % (ref 4–6)

## 2018-09-06 PROCEDURE — 83036 HEMOGLOBIN GLYCOSYLATED A1C: CPT | Performed by: FAMILY MEDICINE

## 2018-09-06 PROCEDURE — G0463 HOSPITAL OUTPT CLINIC VISIT: HCPCS

## 2018-09-06 PROCEDURE — 99214 OFFICE O/P EST MOD 30 MIN: CPT | Performed by: FAMILY MEDICINE

## 2018-09-06 PROCEDURE — 36415 COLL VENOUS BLD VENIPUNCTURE: CPT | Performed by: FAMILY MEDICINE

## 2018-09-06 RX ORDER — NICOTINE 21 MG/24HR
1 PATCH, TRANSDERMAL 24 HOURS TRANSDERMAL EVERY 24 HOURS
Qty: 30 PATCH | Refills: 0 | Status: SHIPPED | OUTPATIENT
Start: 2018-09-06 | End: 2019-06-07

## 2018-09-06 RX ORDER — LISINOPRIL/HYDROCHLOROTHIAZIDE 10-12.5 MG
1 TABLET ORAL DAILY
Qty: 30 TABLET | Refills: 11 | Status: SHIPPED | OUTPATIENT
Start: 2018-09-06 | End: 2022-01-26

## 2018-09-06 RX ORDER — ALPRAZOLAM 0.5 MG
0.5 TABLET ORAL EVERY MORNING
Qty: 30 TABLET | Refills: 5 | Status: SHIPPED | OUTPATIENT
Start: 2018-09-06 | End: 2019-03-17

## 2018-09-06 RX ORDER — CITALOPRAM HYDROBROMIDE 20 MG/1
20 TABLET ORAL DAILY
Qty: 30 TABLET | Refills: 11 | Status: SHIPPED | OUTPATIENT
Start: 2018-09-06 | End: 2019-06-07

## 2018-09-06 ASSESSMENT — ANXIETY QUESTIONNAIRES
3. WORRYING TOO MUCH ABOUT DIFFERENT THINGS: NOT AT ALL
5. BEING SO RESTLESS THAT IT IS HARD TO SIT STILL: NOT AT ALL
GAD7 TOTAL SCORE: 0
7. FEELING AFRAID AS IF SOMETHING AWFUL MIGHT HAPPEN: NOT AT ALL
IF YOU CHECKED OFF ANY PROBLEMS ON THIS QUESTIONNAIRE, HOW DIFFICULT HAVE THESE PROBLEMS MADE IT FOR YOU TO DO YOUR WORK, TAKE CARE OF THINGS AT HOME, OR GET ALONG WITH OTHER PEOPLE: NOT DIFFICULT AT ALL
2. NOT BEING ABLE TO STOP OR CONTROL WORRYING: NOT AT ALL
1. FEELING NERVOUS, ANXIOUS, OR ON EDGE: NOT AT ALL
6. BECOMING EASILY ANNOYED OR IRRITABLE: NOT AT ALL

## 2018-09-06 ASSESSMENT — PATIENT HEALTH QUESTIONNAIRE - PHQ9: 5. POOR APPETITE OR OVEREATING: NOT AT ALL

## 2018-09-06 ASSESSMENT — PAIN SCALES - GENERAL: PAINLEVEL: NO PAIN (0)

## 2018-09-06 NOTE — PROGRESS NOTES
SUBJECTIVE:  53 year old male who presents for follow-up of medication and his lab work.  He had labs done last week.  He is been feeling just fine.  He quit drinking several months ago, as his girlfriend has end-stage liver disease.  He says he feels much better since quitting drinking.  He also has cut down on smoking, he rolls his own, and generally has about 10 cigarettes per day.  He is anxious to quit and would like to try using NicoDerm patches again which have been helpful in the past.    His anxiety and depression symptoms have been well controlled with his current Celexa and one alprazolam a day.  He does need refills.    He is tolerating his blood pressure medication well and has no particular problems or concerns.  No cardiopulmonary or neurologic symptoms.  Overall says he feels well.    He had a fairly severe proximal humeral fracture last winter, but he says it has healed fine he has good range of motion and has no pain or complaints.    Additional Review of Systems: See HPI: No new symptoms otherwise    Past Medical History:   Diagnosis Date     Chronic or unspecified gastric ulcer with perforation (H)     7/11/09     Closed displaced fracture of shaft of third metacarpal bone of left hand     11/29/2016     Closed nondisplaced fracture of proximal phalanx of left little finger     12/13/2016     Fracture of upper end of right humerus with routine healing     1/11/2018        Current Outpatient Prescriptions   Medication Sig Dispense Refill     ALPRAZolam (XANAX) 0.5 MG tablet Take 1 tablet (0.5 mg) by mouth every morning 30 tablet 5     citalopram (CELEXA) 20 MG tablet Take 1 tablet (20 mg) by mouth daily 30 tablet 11     lisinopril-hydrochlorothiazide (PRINZIDE/ZESTORETIC) 10-12.5 MG per tablet Take 1 tablet by mouth daily 30 tablet 11     nicotine (NICODERM CQ) 14 MG/24HR 24 hr patch Place 1 patch onto the skin every 24 hours 30 patch 0     nicotine (NICODERM CQ) 7 MG/24HR 24 hr patch Place 1  patch onto the skin every 24 hours 30 patch 0     nicotine (NICODERM CQ) 7 MG/24HR 24 hr patch 1 patch daily       [DISCONTINUED] citalopram (CELEXA) 20 MG tablet Take 1 tablet (20 mg) by mouth daily 30 tablet 1     [DISCONTINUED] lisinopril-hydrochlorothiazide (PRINZIDE/ZESTORETIC) 10-12.5 MG per tablet Take 1 tablet by mouth daily 30 tablet 1       Allergies as of 09/06/2018     (No Known Allergies)        OBJECTIVE:  /84 (BP Location: Right arm, Patient Position: Sitting, Cuff Size: Adult Large)  Pulse 92  Temp 98.2  F (36.8  C) (Temporal)  Resp 20  Ht 6' (1.829 m)  Wt 222 lb 6.4 oz (100.9 kg)  BMI 30.16 kg/m2  EXAM: {EXAM -   General: He is a pleasant healthy-appearing 53-year-old man, cooperative, answers questions appropriately and is in no acute distress  HEENT/neck: ENT exam is within normal limits.  Neck is supple with good carotid pulses, no bruits, no thyromegaly or adenopathy  Chest/cardiac: Lungs are clear with good air movement.  Cardiac exam is normal.  No peripheral edema.  Good distal pulses.  Abdomen/: Abdomen is soft and nontender with no organomegaly or masses, bowel sounds are normal  Skin: No significant lesions or rashes noted  Extremities: Full range of motion of the right shoulder with no tenderness.  CMS is intact.  Neuro/psych: Neuro is intact and nonfocal.  He is alert and oriented with normal mood and affect.    Labs/imaging: Recent labs are reviewed and were all normal or acceptable.  Blood glucose was slightly elevated at 122 and triglycerides at 254.  Also reviewed his right shoulder x-ray done last January.    ASSESSMENT/PLAN:  Hypertension with good control.  Medication will remain the same, refilled for a year.  Reviewed all his lab work with him.    Hyperglycemia.  Will check A1c today and notify him of the result.    History of alcohol abuse-currently in remission    Cigarette addiction-currently working to quit.  NicoDerm prescribed.    Anxiety and depression-well  controlled with current meds.  Refills given.  GEOFF MENDEZ MD on 9/6/2018 at 12:40 PM

## 2018-09-06 NOTE — NURSING NOTE
Chief Complaint   Patient presents with     Recheck Medication     Medication check       Initial /84 (BP Location: Right arm, Patient Position: Sitting, Cuff Size: Adult Large)  Pulse 92  Temp 98.2  F (36.8  C) (Temporal)  Resp 20  Ht 6' (1.829 m)  Wt 222 lb 6.4 oz (100.9 kg)  BMI 30.16 kg/m2 Estimated body mass index is 30.16 kg/(m^2) as calculated from the following:    Height as of this encounter: 6' (1.829 m).    Weight as of this encounter: 222 lb 6.4 oz (100.9 kg).  Medication Reconciliation: complete    Michael Mchugh LPN

## 2018-09-06 NOTE — MR AVS SNAPSHOT
After Visit Summary   9/6/2018    Lazaro Man    MRN: [id number]           Patient Information     Date Of Birth          1965        Visit Information        Provider Department      9/6/2018 11:15 AM Xavi Gonzalse MD [facility]        Today's Diagnoses     Cigarette nicotine dependence without complication    -  1    Benign essential hypertension        Anxiety        Stress and adjustment reaction        Hyperglycemia           Follow-ups after your visit        Who to contact     If you have questions or need follow up information about today's clinic visit or your schedule please contact [facility] directly at [phone].  Normal or non-critical lab and imaging results will be communicated to you by MyChart, letter or phone within 4 business days after the clinic has received the results. If you do not hear from us within 7 days, please contact the clinic through [employer] or phone. If you have a critical or abnormal lab result, we will notify you by phone as soon as possible.  Submit refill requests through i[employer] Reminder or call your pharmacy and they will forward the refill request to us. Please allow 3 business days for your refill to be completed.          Additional Information About Your Visit        Care EveryWhere ID     This is your Care EveryWhere ID. This could be used by other organizations to access your [facility] medical records  [id number]        Your Vitals Were     Pulse Temperature Respirations Height BMI (Body Mass Index)       92 98.2  F (36.8  C) (Temporal) 20 6' (1.829 m) 30.16 kg/m2        Blood Pressure from Last 3 Encounters:   09/06/18 120/84   01/15/18 138/88   01/08/18 144/82    Weight from Last 3 Encounters:   09/06/18 222 lb 6.4 oz (100.9 kg)   01/15/18 231 lb (104.8 kg)   01/08/18 231 lb 6 oz (105 kg)              We Performed the Following     Hemoglobin A1c          Today's Medication Changes          These  changes are accurate as of 9/6/18 12:40 PM.  If you have any questions, ask your nurse or doctor.               These medicines have changed or have updated prescriptions.        Dose/Directions    * nicotine 7 MG/24HR 24 hr patch   Commonly known as:  NICODERM CQ   This may have changed:  Another medication with the same name was added. Make sure you understand how and when to take each.   Changed by:  Xavi Gonzales MD        Dose:  1 patch   1 patch daily   Refills:  0       * nicotine 14 MG/24HR 24 hr patch   Commonly known as:  NICODERM CQ   This may have changed:  You were already taking a medication with the same name, and this prescription was added. Make sure you understand how and when to take each.   Used for:  Cigarette nicotine dependence without complication   Changed by:  Xavi Gonzales MD        Dose:  1 patch   Place 1 patch onto the skin every 24 hours   Quantity:  30 patch   Refills:  0       * nicotine 7 MG/24HR 24 hr patch   Commonly known as:  NICODERM CQ   This may have changed:  You were already taking a medication with the same name, and this prescription was added. Make sure you understand how and when to take each.   Used for:  Cigarette nicotine dependence without complication   Changed by:  Xavi Gonzales MD        Dose:  1 patch   Place 1 patch onto the skin every 24 hours   Quantity:  30 patch   Refills:  0       * Notice:  This list has 3 medication(s) that are the same as other medications prescribed for you. Read the directions carefully, and ask your doctor or other care provider to review them with you.         Where to get your medicines      These medications were sent to [facility] [address] [place], [place] - [address] [facility] [address] [facility] [address]     Phone:  [phone]     citalopram 20 MG tablet    lisinopril-hydrochlorothiazide 10-12.5 MG per tablet    nicotine 14 MG/24HR 24 hr patch         Some  of these will need a paper prescription and others can be bought over the counter.  Ask your nurse if you have questions.     Bring a paper prescription for each of these medications     ALPRAZolam 0.5 MG tablet    nicotine 7 MG/24HR 24 hr patch                Primary Care Provider Office Phone # Fax #    Xavi Gonzales [phone] [address] [facility] [address]        Equal Access to Services     [facility] : Hadii aad ku hadasho Soomaali, waaxda luqadaha, qaybta kaalmada adeegyada, waxay idiin hayaan adeeg marysemarysharla lalenora . So [facility] [phone].    ATENCIÓN: Si jorgela espkarol, tiene a butler disposición servicios gratuitos de asistencia lingüística. Llame al [phone].    We comply with applicable federal civil rights laws and [place] laws. We do not discriminate on the basis of race, color, national origin, age, disability, sex, sexual orientation, or gender identity.            Thank you!     Thank you for choosing [facility]  for your care. Our goal is always to provide you with excellent care. Hearing back from our patients is one way we can continue to improve our services. Please take a few minutes to complete the written survey that you may receive in the mail after your visit with us. Thank you!             Your Updated Medication List - Protect others around you: Learn how to safely use, store and throw away your medicines at www.disposemymeds.org.          This list is accurate as of 9/6/18 12:40 PM.  Always use your most recent med list.                   Brand Name Dispense Instructions for use Diagnosis    ALPRAZolam 0.5 MG tablet    XANAX    30 tablet    Take 1 tablet (0.5 mg) by mouth every morning    Anxiety       citalopram 20 MG tablet    celeXA    30 tablet    Take 1 tablet (20 mg) by mouth daily    Anxiety, Stress and adjustment reaction       lisinopril-hydrochlorothiazide 10-12.5 MG per tablet    PRINZIDE/ZESTORETIC    30  tablet    Take 1 tablet by mouth daily    Benign essential hypertension       * nicotine 7 MG/24HR 24 hr patch    NICODERM CQ     1 patch daily        * nicotine 14 MG/24HR 24 hr patch    NICODERM CQ    30 patch    Place 1 patch onto the skin every 24 hours    Cigarette nicotine dependence without complication       * nicotine 7 MG/24HR 24 hr patch    NICODERM CQ    30 patch    Place 1 patch onto the skin every 24 hours    Cigarette nicotine dependence without complication       * Notice:  This list has 3 medication(s) that are the same as other medications prescribed for you. Read the directions carefully, and ask your doctor or other care provider to review them with you.

## 2018-09-06 NOTE — NURSING NOTE
Lazaro presents to the clinic today for a medication check and review of recent labs.          Michael Mchugh LPN 09/06/18 11:24 AM

## 2018-09-06 NOTE — LETTER
September 6, 2018      Lazaro Man  [address] [place] [address]        Dear ,    As you can see, this A1c test was just minimally elevated at 6.1%.  This means an average blood sugar of just over 120.  I would suggest that just working on diet and exercise would be beneficial, and then the test should be repeated again about the middle of December.  If you notify me in December I can order the test for you and you can just come in and get it done and I will let you know the results and if you need to do anything further.  In the meantime, diet and exercise are important.    Resulted Orders   Hemoglobin A1c   Result Value Ref Range    Hemoglobin A1C 6.1 (H) 4.0 - 6.0 %       If you have any questions or concerns, please call the clinic at the number listed above.       Sincerely,        GEOFF MENDEZ MD

## 2018-09-07 ASSESSMENT — PATIENT HEALTH QUESTIONNAIRE - PHQ9: SUM OF ALL RESPONSES TO PHQ QUESTIONS 1-9: 0

## 2018-09-07 ASSESSMENT — ANXIETY QUESTIONNAIRES: GAD7 TOTAL SCORE: 0

## 2019-03-17 DIAGNOSIS — F41.9 ANXIETY: Primary | ICD-10-CM

## 2019-03-17 DIAGNOSIS — F41.9 ANXIETY DISORDER, UNSPECIFIED: ICD-10-CM

## 2019-03-19 RX ORDER — ALPRAZOLAM 0.5 MG
TABLET ORAL
Qty: 2 TABLET | Refills: 0 | Status: SHIPPED | OUTPATIENT
Start: 2019-03-19 | End: 2019-06-07

## 2019-03-19 NOTE — TELEPHONE ENCOUNTER
[facility] sent Rx request for the following:      ALPRAZOLAM 0.5MG TABLETS  Sig: TAKE 1 TABLET BY MOUTH EVERY MORNING  Last Prescription Date:   18  Last Fill Qty/Refills:         30, R-5    Last Office Visit:              18 (Former WLR Pt)  Future Office visit:           None.    Routing refill request to provider for review/approval because:  Drug not on the [facility], [facility] or [facility] refill protocol or controlled substance    In the absence of Dr. Gonzales, it is recommended that Patient call to schedule appointment with one of our other providers to discuss your medications and medication refills. At the same time, Pt can discuss you might intend on establishing care with.    Attempted reaching Patient to inform him of the above information. Number noted is not a working number.    Called and spoke with Mt at [facility], after verifying Patient's last name and . They have the following number on file for Patient: [phone].     Called and spoke to Patient after verifying last name and date of birth. He was notified of the above information. Pt states he is OUT OF MEDICATION AND NEEDING A REFILL ASAP! Pt transferred to scheduling line, to make medication management appointment:  Next 5 appointments (look out 90 days)    Mar 21, 2019  3:30 PM CDT  Office Visit with Yevgeniy Lopez MD  [facility] ([facility] [address]  [place] [id number] [phone]        In clinical absence of Dr. Gonzales and Dr. Lopez, patient is requesting that this message be sent to the Doc of the Day for consideration please. Unable to complete prescription refill per RN Medication Refill Policy. Barbara De Jesus RN .............. 3/19/2019  2:40 PM

## 2019-06-07 ENCOUNTER — OFFICE VISIT (OUTPATIENT)
Dept: FAMILY MEDICINE | Facility: OTHER | Age: 54
End: 2019-06-07
Attending: FAMILY MEDICINE
Payer: COMMERCIAL

## 2019-06-07 VITALS
RESPIRATION RATE: 18 BRPM | HEART RATE: 94 BPM | OXYGEN SATURATION: 98 % | DIASTOLIC BLOOD PRESSURE: 88 MMHG | WEIGHT: 225.6 LBS | TEMPERATURE: 98.1 F | BODY MASS INDEX: 30.6 KG/M2 | SYSTOLIC BLOOD PRESSURE: 138 MMHG

## 2019-06-07 DIAGNOSIS — F41.9 ANXIETY: ICD-10-CM

## 2019-06-07 DIAGNOSIS — K52.9 CHRONIC DIARRHEA: ICD-10-CM

## 2019-06-07 DIAGNOSIS — Z87.11 HISTORY OF PEPTIC ULCER DISEASE: Primary | ICD-10-CM

## 2019-06-07 DIAGNOSIS — R10.13 DYSPEPSIA: ICD-10-CM

## 2019-06-07 PROCEDURE — 36415 COLL VENOUS BLD VENIPUNCTURE: CPT | Mod: ZL | Performed by: FAMILY MEDICINE

## 2019-06-07 PROCEDURE — G0463 HOSPITAL OUTPT CLINIC VISIT: HCPCS

## 2019-06-07 PROCEDURE — 99214 OFFICE O/P EST MOD 30 MIN: CPT | Performed by: FAMILY MEDICINE

## 2019-06-07 PROCEDURE — 82784 ASSAY IGA/IGD/IGG/IGM EACH: CPT | Mod: ZL | Performed by: FAMILY MEDICINE

## 2019-06-07 PROCEDURE — 83516 IMMUNOASSAY NONANTIBODY: CPT | Mod: ZL | Performed by: FAMILY MEDICINE

## 2019-06-07 RX ORDER — CHOLESTYRAMINE 4 G/9G
2 POWDER, FOR SUSPENSION ORAL 2 TIMES DAILY WITH MEALS
Qty: 378 G | Refills: 1 | Status: SHIPPED | OUTPATIENT
Start: 2019-06-07 | End: 2019-07-22

## 2019-06-07 RX ORDER — CITALOPRAM HYDROBROMIDE 20 MG/1
20 TABLET ORAL DAILY
Qty: 30 TABLET | Refills: 11 | Status: SHIPPED | OUTPATIENT
Start: 2019-06-07 | End: 2020-08-11

## 2019-06-07 ASSESSMENT — ANXIETY QUESTIONNAIRES
IF YOU CHECKED OFF ANY PROBLEMS ON THIS QUESTIONNAIRE, HOW DIFFICULT HAVE THESE PROBLEMS MADE IT FOR YOU TO DO YOUR WORK, TAKE CARE OF THINGS AT HOME, OR GET ALONG WITH OTHER PEOPLE: VERY DIFFICULT
6. BECOMING EASILY ANNOYED OR IRRITABLE: NEARLY EVERY DAY
2. NOT BEING ABLE TO STOP OR CONTROL WORRYING: NOT AT ALL
7. FEELING AFRAID AS IF SOMETHING AWFUL MIGHT HAPPEN: NEARLY EVERY DAY
1. FEELING NERVOUS, ANXIOUS, OR ON EDGE: MORE THAN HALF THE DAYS
5. BEING SO RESTLESS THAT IT IS HARD TO SIT STILL: NOT AT ALL
3. WORRYING TOO MUCH ABOUT DIFFERENT THINGS: NEARLY EVERY DAY
GAD7 TOTAL SCORE: 14

## 2019-06-07 ASSESSMENT — PATIENT HEALTH QUESTIONNAIRE - PHQ9
5. POOR APPETITE OR OVEREATING: NEARLY EVERY DAY
SUM OF ALL RESPONSES TO PHQ QUESTIONS 1-9: 11

## 2019-06-07 ASSESSMENT — PAIN SCALES - GENERAL: PAINLEVEL: MODERATE PAIN (5)

## 2019-06-07 NOTE — NURSING NOTE
Pt presents to clinic today for medication management.      Medication Reconciliation: complete  Ilene Mchugh LPN

## 2019-06-07 NOTE — LETTER
My Depression Action Plan  Name: Lazaro Man   Date of Birth 1965  Date: 6/7/2019    My doctor: Philip Guallpa   My clinic: [facility]  [address]  [place] [id number] [phone]          GREEN    ZONE   Good Control    What it looks like:     Things are going generally well. You have normal up s and down s. You may even feel depressed from time to time, but bad moods usually last less than a day.   What you need to do:  1. Continue to care for yourself (see self care plan)  2. Check your depression survival kit and update it as needed  3. Follow your physician s recommendations including any medication.  4. Do not stop taking medication unless you consult with your physician first.           YELLOW         ZONE Getting Worse    What it looks like:     Depression is starting to interfere with your life.     It may be hard to get out of bed; you may be starting to isolate yourself from others.    Symptoms of depression are starting to last most all day and this has happened for several days.     You may have suicidal thoughts but they are not constant.   What you need to do:     1. Call your care team, your response to treatment will improve if you keep your care team informed of your progress. Yellow periods are signs an adjustment may need to be made.     2. Continue your self-care, even if you have to fake it!    3. Talk to someone in your support network    4. Open up your depression survival kit           RED    ZONE Medical Alert - Get Help    What it looks like:     Depression is seriously interfering with your life.     You may experience these or other symptoms: You can t get out of bed most days, can t work or engage in other necessary activities, you have trouble taking care of basic hygiene, or basic responsibilities, thoughts of suicide or death that will not go away, self-injurious behavior.     What you need to do:  1. Call your care  team and request a same-day appointment. If they are not available (weekends or after hours) call your local crisis line, emergency room or 911.            Depression Self Care Plan / Survival Kit    Self-Care for Depression  Here s the deal. Your body and mind are really not as separate as most people think.  What you do and think affects how you feel and how you feel influences what you do and think. This means if you do things that people who feel good do, it will help you feel better.  Sometimes this is all it takes.  There is also a place for medication and therapy depending on how severe your depression is, so be sure to consult with your medical provider and/ or Behavioral Health Consultant if your symptoms are worsening or not improving.     In order to better manage my stress, I will:    Exercise  Get some form of exercise, every day. This will help reduce pain and release endorphins, the  feel good  chemicals in your brain. This is almost as good as taking antidepressants!  This is not the same as joining a gym and then never going! (they count on that by the way ) It can be as simple as just going for a walk or doing some gardening, anything that will get you moving.      Hygiene   Maintain good hygiene (Get out of bed in the morning, Make your bed, Brush your teeth, Take a shower, and Get dressed like you were going to work, even if you are unemployed).  If your clothes don't fit try to get ones that do.    Diet  I will strive to eat foods that are good for me, drink plenty of water, and avoid excessive sugar, caffeine, alcohol, and other mood-altering substances.  Some foods that are helpful in depression are: complex carbohydrates, B vitamins, flaxseed, fish or fish oil, fresh fruits and vegetables.    Psychotherapy  I agree to participate in Individual Therapy (if recommended).    Medication  If prescribed medications, I agree to take them.  Missing doses can result in serious side effects.  I  understand that drinking alcohol, or other illicit drug use, may cause potential side effects.  I will not stop my medication abruptly without first discussing it with my provider.    Staying Connected With Others  I will stay in touch with my friends, family members, and my primary care provider/team.    Use your imagination  Be creative.  We all have a creative side; it doesn t matter if it s oil painting, sand castles, or mud pies! This will also kick up the endorphins.    Witness Beauty  (AKA stop and smell the roses) Take a look outside, even in mid-winter. Notice colors, textures. Watch the squirrels and birds.     Service to others  Be of service to others.  There is always someone else in need.  By helping others we can  get out of ourselves  and remember the really important things.  This also provides opportunities for practicing all the other parts of the program.    Humor  Laugh and be silly!  Adjust your TV habits for less news and crime-drama and more comedy.    Control your stress  Try breathing deep, massage therapy, biofeedback, and meditation. Find time to relax each day.     My support system    Clinic Contact:  Phone number:    Contact 1:  Phone number:    Contact 2:  Phone number:    Zoroastrian/:  Phone number:    Therapist:  Phone number:    Local crisis center:    Phone number:    Other community support:  Phone number:

## 2019-06-07 NOTE — PROGRESS NOTES
Nursing Notes:   Ilene Mchugh LPN  2019  3:40 PM  Sign at exiting of workspace  Pt presents to clinic today for medication management.      Medication Reconciliation: complete  Ilene Mchugh LPN      SUBJECTIVE:  54 year old male presents to establish care.     He would like a Medical Opinion form completed. Dr Gonzales said he cannot work due to diarrhea. Will have watery bowel movements 3-4 times per day. Had a colonoscopy without signs of colitis in 2016. No testing for celiac disease is evident in chart.    Has depression and anxiety. Sober since 2018.  Weaned off his medications thinking that if he did not drink his mood should be okay, but that was not the case.  He thinks he needs to resume something for depression and anxiety.    Reduced smoking. He will roll his own cigarettes    Known prediabetes. Discussed diet. He gained a little weight since September when A1c was 6.1%. Was eating more food this winter after quitting drinking. Intends to exercise and improve diet this summer.    Stomach pain off and on. Will have this at times and then will have blood in his stool.  He is concerned about a gastric ulcer.  Has a history of this and says that he nearly .  Required a blood transfusion for this in .  Pain has been present since he quit drinking.  He is not on any PPI.    REVIEW OF SYSTEMS:    Constitutional: negative  Respiratory: negative  Cardiovascular: negative    Past Medical History:   Diagnosis Date     Chronic or unspecified gastric ulcer with perforation (H)     09     Closed displaced fracture of shaft of third metacarpal bone of left hand     2016     Closed nondisplaced fracture of proximal phalanx of left little finger     2016     Fracture of upper end of right humerus with routine healing     2018       Past Surgical History:   Procedure Laterality Date     COLONOSCOPY  2016,F/U   5 years     OTHER SURGICAL HISTORY       07/11/09,26057.0,RI REPAIR PERF DUOD RUBY ULC/WND/INJURY,Repair of perforated posterior lesser curve gastric ulcer     OTHER SURGICAL HISTORY      01/31/2012,,HERNIA REPAIR,with mesh underlay        Current Outpatient Medications   Medication Sig Dispense Refill     ALPRAZolam (XANAX) 0.5 MG tablet TAKE 1 TABLET BY MOUTH EVERY MORNING 2 tablet 0     citalopram (CELEXA) 20 MG tablet Take 1 tablet (20 mg) by mouth daily 30 tablet 11     lisinopril-hydrochlorothiazide (PRINZIDE/ZESTORETIC) 10-12.5 MG per tablet Take 1 tablet by mouth daily 30 tablet 11     nicotine (NICODERM CQ) 14 MG/24HR 24 hr patch Place 1 patch onto the skin every 24 hours 30 patch 0     nicotine (NICODERM CQ) 7 MG/24HR 24 hr patch Place 1 patch onto the skin every 24 hours 30 patch 0     nicotine (NICODERM CQ) 7 MG/24HR 24 hr patch 1 patch daily       No Known Allergies    OBJECTIVE:  /88   Pulse 94   Temp 98.1  F (36.7  C) (Tympanic)   Resp 18   Wt 102.3 kg (225 lb 9.6 oz)   SpO2 98%   BMI 30.60 kg/m      EXAM:  General Appearance: Alert. No acute distress  Chest/Respiratory Exam: Clear to auscultation bilaterally  Cardiovascular Exam: Regular rate and rhythm. S1, S2, no murmur, gallop, or rubs.  Gastrointestinal Exam: Soft, tender in epigastric region, no abnormal masses or organomegaly.  Extremities: No lower extremity edema.  Psychiatric: Normal affect and mentation      The 10-year ASCVD risk score (Nanci DC Jr., et al., 2013) is: 12.9%    Values used to calculate the score:      Age: 54 years      Sex: Male      Is Non- : No      Diabetic: No      Tobacco smoker: Yes      Systolic Blood Pressure: 138 mmHg      Is BP treated: Yes      HDL Cholesterol: 49 mg/dL      Total Cholesterol: 176 mg/dL     ASSESSMENT/PLAN:    ICD-10-CM    1. History of peptic ulcer disease Z87.11 omeprazole (PRILOSEC) 20 MG DR capsule     GASTROENTEROLOGY ADULT REF PROCEDURE ONLY   2. Dyspepsia R10.13    3. Chronic diarrhea  K52.9 cholestyramine (QUESTRAN) 4 GM/DOSE powder     GASTROENTEROLOGY ADULT REF CONSULT ONLY     IgA     Tissue transglutaminase Ab IgA and IgG     IgA     Tissue transglutaminase Ab IgA and IgG   4. Anxiety F41.9 citalopram (CELEXA) 20 MG tablet     PSYCHOLOGY REFERRAL       Stomach pain with history of bleeding ulcer.  He has chronic alcohol dependence having quit last year.  High chance of alcoholic gastritis or ulcer present.  Placed on omeprazole 20 mg daily and referred for an EGD.    Chronic diarrhea.  He did have a colonoscopy with the same symptoms and no colitis was evident.  Has not seen gastroenterology for evaluation.  His mother has similar symptoms without a clear diagnosis.  Checked for celiac disease today with a TTG.  Discussed trying loperamide, but it sounds like he might of tried this before.  Therefore we will try cholestyramine 2 g twice daily to see if there is any benefit.  I did agree to complete the medical opinion form stating he can still not work due to the diarrhea, but we need to reassess in 6 months rather than a year.  Needs to have further evaluation of this rather than stating he cannot work. Referral placed to Gastroenterology.    For anxiety, resume citalopram at 20 mg daily.  Given a list of counselors.  F/U 1 month    Greater than 50% of this 30 minute appointment spent on counseling   Philip Guallpa MD    This document was prepared using a combination of typing and voice generated software.  While every attempt was made for accuracy, spelling and grammatical errors may exist.

## 2019-06-07 NOTE — PATIENT INSTRUCTIONS
Start omeprazole to protect against a stomach ulcer  Referral to have a stomach scope with Dr Phelps    Checking labs for a cause of the diarrhea  Try cholestyramine 2 g twice daily to reduce stool frequency  Referral to Gastroenterology     Restart citalopram  Check on counseling    Follow-up in a month

## 2019-06-08 ASSESSMENT — ANXIETY QUESTIONNAIRES: GAD7 TOTAL SCORE: 14

## 2019-06-10 ENCOUNTER — TELEPHONE (OUTPATIENT)
Dept: SURGERY | Facility: OTHER | Age: 54
End: 2019-06-10

## 2019-06-10 DIAGNOSIS — Z86.0101 H/O ADENOMATOUS POLYP OF COLON: Primary | ICD-10-CM

## 2019-06-10 RX ORDER — BISACODYL 5 MG
TABLET, DELAYED RELEASE (ENTERIC COATED) ORAL
Qty: 2 TABLET | Refills: 0 | Status: ON HOLD | OUTPATIENT
Start: 2019-06-10 | End: 2019-07-29

## 2019-06-10 RX ORDER — POLYETHYLENE GLYCOL 3350, SODIUM CHLORIDE, SODIUM BICARBONATE, POTASSIUM CHLORIDE 420; 11.2; 5.72; 1.48 G/4L; G/4L; G/4L; G/4L
4000 POWDER, FOR SOLUTION ORAL ONCE
Qty: 4000 ML | Refills: 0 | Status: ON HOLD | OUTPATIENT
Start: 2019-06-10 | End: 2019-07-29

## 2019-06-10 NOTE — TELEPHONE ENCOUNTER
Patient referred by Dr. Guallpa for a Upper GI Endoscopy,  Diagnosis is history of peptic ulcer disease.  Please advise.  Thank you. Marialuisa Desai on 6/10/2019 at 8:45 AM

## 2019-06-10 NOTE — TELEPHONE ENCOUNTER
Screening Questions for the Scheduling of Screening Colonoscopies   (If Colonoscopy is diagnostic, Provider should review the chart before scheduling.)  Are you younger than 50 or older than 80?  NO   Do you take aspirin or fish oil?  NO  (if yes, tell patient to stop 1 week prior to Colonoscopy)  Do you take warfarin (Coumadin), clopidogrel (Plavix), apixaban (Eliquis), dabigatram (Pradaxa), rivaroxaban (Xarelto) or any blood thinner? NO   Do you use oxygen at home?  NO  Do you have kidney disease? NO   Are you on dialysis? NO   Have you had a stroke or heart attack in the last year? NO   Have you had a stent in your heart or any blood vessel in the last year? NO  Have you had a transplant of any organ? NO   Have you had a colonoscopy or upper endoscopy (EGD) before? YES          When?  2009  Date of scheduled Colonoscopy/EGD  07/29/2019  Provider ALLEN   Pharmacy  [facility]

## 2019-06-11 LAB — IGA SERPL-MCNC: 222 MG/DL (ref 70–380)

## 2019-06-12 LAB
TTG IGA SER-ACNC: 1 U/ML
TTG IGG SER-ACNC: <1 U/ML

## 2019-06-14 ENCOUNTER — TELEPHONE (OUTPATIENT)
Dept: FAMILY MEDICINE | Facility: OTHER | Age: 54
End: 2019-06-14

## 2019-06-21 ENCOUNTER — TELEPHONE (OUTPATIENT)
Dept: FAMILY MEDICINE | Facility: OTHER | Age: 54
End: 2019-06-21

## 2019-06-21 NOTE — TELEPHONE ENCOUNTER
Patient called is wondering if the medical opinion form was filled out and sent. They have not received it yet. Please contact patient.    Nesha Bruno on 6/21/2019 at 9:25 AM

## 2019-06-25 ENCOUNTER — TELEPHONE (OUTPATIENT)
Dept: FAMILY MEDICINE | Facility: OTHER | Age: 54
End: 2019-06-25

## 2019-06-25 NOTE — TELEPHONE ENCOUNTER
Pt states that a medical opinion form was supposed to have been faxed to the county but they never received it.

## 2019-06-26 NOTE — TELEPHONE ENCOUNTER
After verifying pts name and date of birth with pt, pt notified forms have been faxed twice. Forms will resent today via HIM department.  Ilene Mchugh

## 2019-06-28 ENCOUNTER — TELEPHONE (OUTPATIENT)
Dept: FAMILY MEDICINE | Facility: OTHER | Age: 54
End: 2019-06-28

## 2019-06-28 NOTE — TELEPHONE ENCOUNTER
Talked to patient and he says that his  has not received forms that have been faxed several times. I called HIM department and asked that they locate form and refax. They will call us back . Ledy Perez LPN ....................6/28/2019  10:30 AM

## 2019-06-28 NOTE — TELEPHONE ENCOUNTER
Called patient and let him know that form was retrieved and fax again. Verified fax number and patient is aware. Ledy Perez LPN ....................6/28/2019  11:39 AM

## 2019-07-02 ENCOUNTER — TELEPHONE (OUTPATIENT)
Dept: FAMILY MEDICINE | Facility: OTHER | Age: 54
End: 2019-07-02

## 2019-07-02 NOTE — TELEPHONE ENCOUNTER
After verifying pts name and date of birth with Daniela from [facility]. Daniela notified we have faxed forms over multiple time. Notified Daniela I can have pt come an pick the forms up or I can mail them to her. Daniela requesting forms be mailed to her. A copy of the form was mailed at this time.  Ilene Mchugh

## 2019-07-02 NOTE — TELEPHONE ENCOUNTER
Daniela from [facility] is calling regarding a medical opinion for stating whether the patient is able to work or not. She was told by the patient that it has been faxed over on several occasions, however they have not yet received said form for the patient. She would like to get this form refaxed to her. Please give her a call for more information.

## 2019-07-10 PROBLEM — R10.13 EPIGASTRIC PAIN: Status: ACTIVE | Noted: 2019-07-10

## 2019-07-10 PROBLEM — K92.1 BLOOD IN STOOL: Status: ACTIVE | Noted: 2019-07-10

## 2019-07-29 ENCOUNTER — ANESTHESIA EVENT (OUTPATIENT)
Dept: SURGERY | Facility: OTHER | Age: 54
End: 2019-07-29
Payer: COMMERCIAL

## 2019-07-29 ENCOUNTER — HOSPITAL ENCOUNTER (OUTPATIENT)
Facility: OTHER | Age: 54
Discharge: HOME | End: 2019-07-29
Attending: SURGERY | Admitting: SURGERY
Payer: COMMERCIAL

## 2019-07-29 ENCOUNTER — ANESTHESIA (OUTPATIENT)
Dept: SURGERY | Facility: OTHER | Age: 54
End: 2019-07-29
Payer: COMMERCIAL

## 2019-07-29 VITALS
DIASTOLIC BLOOD PRESSURE: 98 MMHG | OXYGEN SATURATION: 96 % | HEART RATE: 76 BPM | HEIGHT: 72 IN | TEMPERATURE: 97 F | BODY MASS INDEX: 29.8 KG/M2 | WEIGHT: 220 LBS | SYSTOLIC BLOOD PRESSURE: 147 MMHG | RESPIRATION RATE: 16 BRPM

## 2019-07-29 DIAGNOSIS — K20.90 ESOPHAGITIS: ICD-10-CM

## 2019-07-29 DIAGNOSIS — K29.90 GASTRODUODENITIS: Primary | ICD-10-CM

## 2019-07-29 PROCEDURE — 25000132 ZZH RX MED GY IP 250 OP 250 PS 637: Performed by: SURGERY

## 2019-07-29 PROCEDURE — 88305 TISSUE EXAM BY PATHOLOGIST: CPT

## 2019-07-29 PROCEDURE — 25800030 ZZH RX IP 258 OP 636: Performed by: SURGERY

## 2019-07-29 PROCEDURE — 25000125 ZZHC RX 250: Performed by: NURSE ANESTHETIST, CERTIFIED REGISTERED

## 2019-07-29 PROCEDURE — 43239 EGD BIOPSY SINGLE/MULTIPLE: CPT | Performed by: SURGERY

## 2019-07-29 PROCEDURE — 25000128 H RX IP 250 OP 636: Performed by: NURSE ANESTHETIST, CERTIFIED REGISTERED

## 2019-07-29 PROCEDURE — 25000125 ZZHC RX 250: Performed by: SURGERY

## 2019-07-29 PROCEDURE — 40000010 ZZH STATISTIC ANES STAT CODE-CRNA PER MINUTE: Performed by: SURGERY

## 2019-07-29 PROCEDURE — 45378 DIAGNOSTIC COLONOSCOPY: CPT | Performed by: SURGERY

## 2019-07-29 PROCEDURE — 43239 EGD BIOPSY SINGLE/MULTIPLE: CPT | Performed by: NURSE ANESTHETIST, CERTIFIED REGISTERED

## 2019-07-29 RX ORDER — LIDOCAINE HYDROCHLORIDE 20 MG/ML
INJECTION, SOLUTION INFILTRATION; PERINEURAL PRN
Status: DISCONTINUED | OUTPATIENT
Start: 2019-07-29 | End: 2019-07-29

## 2019-07-29 RX ORDER — SODIUM CHLORIDE, SODIUM LACTATE, POTASSIUM CHLORIDE, CALCIUM CHLORIDE 600; 310; 30; 20 MG/100ML; MG/100ML; MG/100ML; MG/100ML
INJECTION, SOLUTION INTRAVENOUS CONTINUOUS
Status: DISCONTINUED | OUTPATIENT
Start: 2019-07-29 | End: 2019-07-29 | Stop reason: HOSPADM

## 2019-07-29 RX ORDER — FLUMAZENIL 0.1 MG/ML
0.2 INJECTION, SOLUTION INTRAVENOUS
Status: DISCONTINUED | OUTPATIENT
Start: 2019-07-29 | End: 2019-07-29 | Stop reason: HOSPADM

## 2019-07-29 RX ORDER — OMEPRAZOLE 20 MG/1
40 TABLET, DELAYED RELEASE ORAL DAILY
Qty: 60 TABLET | Refills: 11 | Status: SHIPPED | OUTPATIENT
Start: 2019-07-29 | End: 2021-08-13

## 2019-07-29 RX ORDER — PROPOFOL 10 MG/ML
INJECTION, EMULSION INTRAVENOUS CONTINUOUS PRN
Status: DISCONTINUED | OUTPATIENT
Start: 2019-07-29 | End: 2019-07-29

## 2019-07-29 RX ORDER — NALOXONE HYDROCHLORIDE 0.4 MG/ML
.1-.4 INJECTION, SOLUTION INTRAMUSCULAR; INTRAVENOUS; SUBCUTANEOUS
Status: DISCONTINUED | OUTPATIENT
Start: 2019-07-29 | End: 2019-07-29 | Stop reason: HOSPADM

## 2019-07-29 RX ORDER — ONDANSETRON 2 MG/ML
4 INJECTION INTRAMUSCULAR; INTRAVENOUS
Status: DISCONTINUED | OUTPATIENT
Start: 2019-07-29 | End: 2019-07-29 | Stop reason: HOSPADM

## 2019-07-29 RX ORDER — SIMETHICONE
LIQUID (ML) MISCELLANEOUS PRN
Status: DISCONTINUED | OUTPATIENT
Start: 2019-07-29 | End: 2019-07-29 | Stop reason: HOSPADM

## 2019-07-29 RX ORDER — LIDOCAINE 40 MG/G
CREAM TOPICAL
Status: DISCONTINUED | OUTPATIENT
Start: 2019-07-29 | End: 2019-07-29 | Stop reason: HOSPADM

## 2019-07-29 RX ORDER — PROPOFOL 10 MG/ML
INJECTION, EMULSION INTRAVENOUS PRN
Status: DISCONTINUED | OUTPATIENT
Start: 2019-07-29 | End: 2019-07-29

## 2019-07-29 RX ADMIN — PROPOFOL 140 MCG/KG/MIN: 10 INJECTION, EMULSION INTRAVENOUS at 08:29

## 2019-07-29 RX ADMIN — LIDOCAINE HYDROCHLORIDE 80 MG: 20 INJECTION, SOLUTION INFILTRATION; PERINEURAL at 08:28

## 2019-07-29 RX ADMIN — SODIUM CHLORIDE, POTASSIUM CHLORIDE, SODIUM LACTATE AND CALCIUM CHLORIDE: 600; 310; 30; 20 INJECTION, SOLUTION INTRAVENOUS at 08:13

## 2019-07-29 RX ADMIN — PROPOFOL 130 MG: 10 INJECTION, EMULSION INTRAVENOUS at 08:28

## 2019-07-29 RX ADMIN — MIDAZOLAM 2 MG: 1 INJECTION INTRAMUSCULAR; INTRAVENOUS at 08:19

## 2019-07-29 ASSESSMENT — LIFESTYLE VARIABLES: TOBACCO_USE: 1

## 2019-07-29 ASSESSMENT — MIFFLIN-ST. JEOR: SCORE: 1865.91

## 2019-07-29 NOTE — DISCHARGE INSTRUCTIONS
[facility]  Adult Discharge Orders & Instructions    ________________________________________________________________          For 12 hours after surgery  1. Get plenty of rest.  A responsible adult must stay with you for at least 12 hours after you leave the hospital.   2. You may feel lightheaded.  IF so, sit for a few minutes before standing.  Have someone help you get up.   3. You may have a slight fever. Call the doctor if your fever is over 101 F (38.3 C) (taken under the tongue) or lasts longer than 24 hours.  4. You may have a dry mouth, a sore throat, muscle aches or trouble sleeping.  These should go away after 24 hours.  5. Do not make important or legal decisions.  6.   Do not drive or use heavy equipment.  If you have weakness or tingling, don't drive or use heavy equipment until this feeling goes away.    To contact a doctor, call   [phone]_______________________

## 2019-07-29 NOTE — ANESTHESIA PREPROCEDURE EVALUATION
Anesthesia Pre-Procedure Evaluation    Patient: Lazaro Beltranoutsherif   MRN: [id number] : 1965          Preoperative Diagnosis: blood in stool, epigastric pain    Procedure(s):  ESOPHAGOGASTRODUODENOSCOPY, WITH BIOPSY  COLONOSCOPY    Past Medical History:   Diagnosis Date     Chronic or unspecified gastric ulcer with perforation (H)     09     Closed displaced fracture of shaft of third metacarpal bone of left hand     2016     Closed nondisplaced fracture of proximal phalanx of left little finger     2016     Fracture of upper end of right humerus with routine healing     2018     Past Surgical History:   Procedure Laterality Date     COLONOSCOPY  2016,F/U   5 years     OTHER SURGICAL HISTORY      09,70107.0,CT REPAIR PERF DUOD RUBY ULC/WND/INJURY,Repair of perforated posterior lesser curve gastric ulcer     OTHER SURGICAL HISTORY      2012,,HERNIA REPAIR,with mesh underlay       Anesthesia Evaluation     . Pt has had prior anesthetic.      woke up early during a previous anesthetic years ago      ROS/MED HX    ENT/Pulmonary:     (+)tobacco use, Current use , . .    Neurologic:  - neg neurologic ROS     Cardiovascular:     (+) hypertension----. : . . . :. .       METS/Exercise Tolerance:     Hematologic:  - neg hematologic  ROS       Musculoskeletal:  - neg musculoskeletal ROS       GI/Hepatic:     (+) GERD bowel prep, Other GI/Hepatic blood in stool      Renal/Genitourinary:  - ROS Renal section negative       Endo:  - neg endo ROS       Psychiatric:     (+) psychiatric history anxiety and depression      Infectious Disease:  - neg infectious disease ROS       Malignancy:      - no malignancy   Other:    - neg other ROS                      Physical Exam  Normal systems: cardiovascular, pulmonary and dental    Airway   Mallampati: II  TM distance: >3 FB  Neck ROM: full    Dental     Cardiovascular   Rhythm and rate: regular and normal      Pulmonary     breath sounds clear to auscultation            Lab Results   Component Value Date    WBC 10.0 08/31/2018    HGB 16.8 08/31/2018    HCT 48.1 08/31/2018     08/31/2018     08/31/2018    POTASSIUM 4.2 08/31/2018    CHLORIDE 104 08/31/2018    CO2 29 08/31/2018    BUN 10 08/31/2018    CR 0.94 08/31/2018     (H) 08/31/2018    FAUSTO 9.7 08/31/2018    ALBUMIN 4.2 08/31/2018    PROTTOTAL 7.0 08/31/2018    ALT 18 08/31/2018    AST 17 08/31/2018    ALKPHOS 83 08/31/2018    BILITOTAL 0.3 08/31/2018       Preop Vitals  BP Readings from Last 3 Encounters:   06/07/19 138/88   09/06/18 120/84   01/15/18 138/88    Pulse Readings from Last 3 Encounters:   06/07/19 94   09/06/18 92   01/15/18 60      Resp Readings from Last 3 Encounters:   06/07/19 18   09/06/18 20   01/06/17 18    SpO2 Readings from Last 3 Encounters:   06/07/19 98%      Temp Readings from Last 1 Encounters:   06/07/19 98.1  F (36.7  C) (Tympanic)    Ht Readings from Last 1 Encounters:   09/06/18 1.829 m (6')      Wt Readings from Last 1 Encounters:   06/07/19 102.3 kg (225 lb 9.6 oz)    Estimated body mass index is 30.6 kg/m  as calculated from the following:    Height as of 9/6/18: 1.829 m (6').    Weight as of 6/7/19: 102.3 kg (225 lb 9.6 oz).       Anesthesia Plan      History & Physical Review      ASA Status:  2 .    NPO Status:  > 8 hours    Plan for MAC          Postoperative Care      Consents  Anesthetic plan, risks, benefits and alternatives discussed with:  Patient.  Use of blood products discussed: Yes.   Use of blood products discussed with Patient.  Consented to blood products.  .                 ANTONIETA Euceda CRNA

## 2019-07-29 NOTE — H&P
History and Physical    CHIEF COMPLAINT / REASON FOR PROCEDURE:  Blood in stool and epigastric pain    PERTINENT HISTORY   Patient is a 54 year old male who presents today for colonoscopy  And upper endoscopy for blood in stool and epigastric pain.   Last colonoscopy 2016. Had perforated gastric ulcer 2009.  Patient has no other complaints.    Past Medical History:   Diagnosis Date     Chronic or unspecified gastric ulcer with perforation (H)     7/11/09     Closed displaced fracture of shaft of third metacarpal bone of left hand     11/29/2016     Closed nondisplaced fracture of proximal phalanx of left little finger     12/13/2016     Fracture of upper end of right humerus with routine healing     1/11/2018     Past Surgical History:   Procedure Laterality Date     COLONOSCOPY  04/04/2016 4/4/16,F/U 2021  5 years     OTHER SURGICAL HISTORY      07/11/09,82297.0,PA REPAIR PERF DUOD RUBY ULC/WND/INJURY,Repair of perforated posterior lesser curve gastric ulcer     OTHER SURGICAL HISTORY      01/31/2012,,HERNIA REPAIR,with mesh underlay       Bleeding tendencies:  No    ALLERGIES/SENSITIVITIES: No Known Allergies     CURRENT MEDICATIONS:    Prior to Admission medications    Medication Sig Start Date End Date Taking? Authorizing Provider   bisacodyl (DULCOLAX) 5 MG EC tablet Use as directed per colonoscopy prep. 6/10/19   Louie Phelps MD   citalopram (CELEXA) 20 MG tablet Take 1 tablet (20 mg) by mouth daily 6/7/19   Philip Guallpa MD   lisinopril-hydrochlorothiazide (PRINZIDE/ZESTORETIC) 10-12.5 MG per tablet Take 1 tablet by mouth daily 9/6/18   Xavi Gonzales MD   omeprazole (PRILOSEC) 20 MG DR capsule Take 1 capsule (20 mg) by mouth daily 6/7/19   Philip Guallpa MD   polyethylene glycol-electrolytes (NULYTELY) 420 g solution Take 4,000 mLs by mouth once for 1 dose 6/10/19 6/10/19  Louie Phelps MD       Physical Exam:  There were no vitals taken for this visit.  EXAM:  Chest/Respiratory  Exam: Normal - Clear to auscultation without rales, rhonchi, or wheezing.  Cardiovascular Exam: normal, regular rate and rhythm      PLAN: COLONOSCOPY and upper endoscopy .  Patient understands risks of bleeding, perforation, potential inability to reach cecum, aspiration and wishes to proceed.

## 2019-07-29 NOTE — H&P (VIEW-ONLY)
History and Physical    CHIEF COMPLAINT / REASON FOR PROCEDURE:  Blood in stool and epigastric pain    PERTINENT HISTORY   Patient is a 54 year old male who presents today for colonoscopy  And upper endoscopy for blood in stool and epigastric pain.   Last colonoscopy 2016. Had perforated gastric ulcer 2009.  Patient has no other complaints.    Past Medical History:   Diagnosis Date     Chronic or unspecified gastric ulcer with perforation (H)     7/11/09     Closed displaced fracture of shaft of third metacarpal bone of left hand     11/29/2016     Closed nondisplaced fracture of proximal phalanx of left little finger     12/13/2016     Fracture of upper end of right humerus with routine healing     1/11/2018     Past Surgical History:   Procedure Laterality Date     COLONOSCOPY  04/04/2016 4/4/16,F/U 2021  5 years     OTHER SURGICAL HISTORY      07/11/09,81993.0,[place] REPAIR PERF DUOD RUBY ULC/WND/INJURY,Repair of perforated posterior lesser curve gastric ulcer     OTHER SURGICAL HISTORY      01/31/2012,,HERNIA REPAIR,with mesh underlay       Bleeding tendencies:  No    ALLERGIES/SENSITIVITIES: No Known Allergies     CURRENT MEDICATIONS:    Prior to Admission medications    Medication Sig Start Date End Date Taking? Authorizing Provider   bisacodyl (DULCOLAX) 5 MG EC tablet Use as directed per colonoscopy prep. 6/10/19   Louie Phelps MD   citalopram (CELEXA) 20 MG tablet Take 1 tablet (20 mg) by mouth daily 6/7/19   Philip Guallpa MD   lisinopril-hydrochlorothiazide (PRINZIDE/ZESTORETIC) 10-12.5 MG per tablet Take 1 tablet by mouth daily 9/6/18   Xavi Gonzales MD   omeprazole (PRILOSEC) 20 MG DR capsule Take 1 capsule (20 mg) by mouth daily 6/7/19   Philip Guallpa MD   polyethylene glycol-electrolytes (NULYTELY) 420 g solution Take 4,000 mLs by mouth once for 1 dose 6/10/19 6/10/19  Louie Phelps MD       Physical Exam:  There were no vitals taken for this visit.  EXAM:  Chest/Respiratory  Exam: Normal - Clear to auscultation without rales, rhonchi, or wheezing.  Cardiovascular Exam: normal, regular rate and rhythm      PLAN: COLONOSCOPY and upper endoscopy .  Patient understands risks of bleeding, perforation, potential inability to reach cecum, aspiration and wishes to proceed.

## 2019-07-29 NOTE — ANESTHESIA POSTPROCEDURE EVALUATION
Patient: Lazaro Man    Procedure(s):  ESOPHAGOGASTRODUODENOSCOPY, WITH BIOPSY  COLONOSCOPY    Diagnosis:blood in stool, epigastric pain  Diagnosis Additional Information: No value filed.    Anesthesia Type:  MAC    Note:  Anesthesia Post Evaluation    Patient location during evaluation: Phase 2 and Endoscopy Recovery  Patient participation: Able to fully participate in evaluation  Level of consciousness: awake and alert  Pain management: adequate  Airway patency: patent  Cardiovascular status: acceptable  Respiratory status: acceptable  Hydration status: acceptable  PONV: none     Anesthetic complications: None          Last vitals:  Vitals:    07/29/19 0806 07/29/19 0855 07/29/19 0900   BP: (!) 158/108 124/80 138/81   Pulse: 80 81 80   Resp: 16     Temp: 97.2  F (36.2  C) 97  F (36.1  C)    SpO2: 99% 97% 96%         Electronically Signed By: ANTONIETA Euceda CRNA  July 29, 2019  9:52 AM

## 2019-07-29 NOTE — OP NOTE
Procedure note  Date of service: 7/29/2019    Preoperative diagnosis: epicastric  pain    Postoperative diagnosis: Esophagitis  Gastroduodenitis  Inadequate colon prep    Procedure:   EGD with biopsy      Anesthesia:   MAC N Carlos    Indication for the procedure:This is a 54 year old male with epigastric pain, blood in stool.  After explaining the risks to include bleeding, aspiration, perforation, the patient wished to proceed.    Procedure:After adequate sedation, the patient was in the left lateral decubitus position.  The esophagoscope was passed under direct vision into the esophagus and onto the second portion of the duodenum.  The duodenum was erythematous and biopsied.  The antrum was erythematous with prominent rugae in body, but no ulceration.  Biopsies were taken from the antrum to look for occult H. Pylori.  The scope was retroflexed.  The GE junction and fundus were unremarkable .  Scope was straightened and pulled back.  The distal esophagus was with slightly irregular z-line and erythema at z-line .  Biopsies x 8 were taken from the distal esophagus.  The remaining esophagus was unremarkable . The scope was removed.The patient tolerated the procedure well.  Rectal exam was unremarkable. Colonscope placed in rectum and solid stool encountered into sigmoid and scope removed. Will need re-prep.    Recommendations:    Full dose PPI  Await pathology  Re-schedule colonoscopy with extra prep.    Surgeon: Louie Phelps MD FACS    Referring physician:  Philip Guallpa

## 2019-07-29 NOTE — ANESTHESIA CARE TRANSFER NOTE
Patient: Lazaro Man    Procedure(s):  ESOPHAGOGASTRODUODENOSCOPY, WITH BIOPSY  COLONOSCOPY    Diagnosis: blood in stool, epigastric pain  Diagnosis Additional Information: No value filed.    Anesthesia Type:   MAC     Note:  Airway :Nasal Cannula  Patient transferred to:Phase II  Handoff Report: Identifed the Patient, Identified the Reponsible Provider, Reviewed the pertinent medical history, Discussed the surgical course, Reviewed Intra-OP anesthesia mangement and issues during anesthesia, Set expectations for post-procedure period and Allowed opportunity for questions and acknowledgement of understanding      Vitals: (Last set prior to Anesthesia Care Transfer)    CRNA VITALS  7/29/2019 0819 - 7/29/2019 0853      7/29/2019             Resp Rate (set):  10                Electronically Signed By: ANTONIETA IRBY CRNA  July 29, 2019  8:53 AM

## 2019-07-30 PROBLEM — K22.70 BARRETT'S ESOPHAGUS WITHOUT DYSPLASIA: Status: ACTIVE | Noted: 2019-07-29

## 2019-07-31 ENCOUNTER — TELEPHONE (OUTPATIENT)
Dept: SURGERY | Facility: OTHER | Age: 54
End: 2019-07-31

## 2019-08-02 DIAGNOSIS — Z86.0101 H/O ADENOMATOUS POLYP OF COLON: Primary | ICD-10-CM

## 2019-08-02 NOTE — TELEPHONE ENCOUNTER
Screening Questions for the Scheduling of Screening Colonoscopies   (If Colonoscopy is diagnostic, Provider should review the chart before scheduling.)  Are you younger than 50 or older than 80?  NO  Do you take aspirin or fish oil?  NO  (if yes, tell patient to stop 1 week prior to Colonoscopy)  Do you take warfarin (Coumadin), clopidogrel (Plavix), apixaban (Eliquis), dabigatram (Pradaxa), rivaroxaban (Xarelto) or any blood thinner? NO  Do you use oxygen at home?  NO  Do you have kidney disease? NO  Are you on dialysis? NO  Have you had a stroke or heart attack in the last year? NO  Have you had a stent in your heart or any blood vessel in the last year? NO  Have you had a transplant of any organ? NO  Have you had a colonoscopy or upper endoscopy (EGD) before? YES         When?  ? [facility]  Date of scheduled Colonoscopy. 8/12/2019  Provider [facility]  Pharmacy [facility]  THIS IS A RE-PREP  PATIENT WOULD LIKE TO BE SURE HE GETS THE 2 PILLS WITH HIS PREP KIT.

## 2019-08-05 ENCOUNTER — HOSPITAL ENCOUNTER (OUTPATIENT)
Facility: OTHER | Age: 54
End: 2019-08-05
Attending: SURGERY | Admitting: SURGERY
Payer: COMMERCIAL

## 2019-08-05 RX ORDER — POLYETHYLENE GLYCOL 3350, SODIUM CHLORIDE, SODIUM BICARBONATE, POTASSIUM CHLORIDE 420; 11.2; 5.72; 1.48 G/4L; G/4L; G/4L; G/4L
4000 POWDER, FOR SOLUTION ORAL ONCE
Qty: 4000 ML | Refills: 0 | Status: ON HOLD | OUTPATIENT
Start: 2019-08-05 | End: 2019-08-26

## 2019-08-05 RX ORDER — BISACODYL 5 MG
TABLET, DELAYED RELEASE (ENTERIC COATED) ORAL
Qty: 2 TABLET | Refills: 0 | Status: ON HOLD | OUTPATIENT
Start: 2019-08-05 | End: 2019-08-26

## 2019-08-07 RX ORDER — NALOXONE HYDROCHLORIDE 0.4 MG/ML
.1-.4 INJECTION, SOLUTION INTRAMUSCULAR; INTRAVENOUS; SUBCUTANEOUS
Status: CANCELLED | OUTPATIENT
Start: 2019-08-07 | End: 2019-08-08

## 2019-08-07 RX ORDER — SODIUM CHLORIDE, SODIUM LACTATE, POTASSIUM CHLORIDE, CALCIUM CHLORIDE 600; 310; 30; 20 MG/100ML; MG/100ML; MG/100ML; MG/100ML
INJECTION, SOLUTION INTRAVENOUS CONTINUOUS
Status: CANCELLED | OUTPATIENT
Start: 2019-08-07

## 2019-08-07 RX ORDER — LIDOCAINE 40 MG/G
CREAM TOPICAL
Status: CANCELLED | OUTPATIENT
Start: 2019-08-07

## 2019-08-07 RX ORDER — FLUMAZENIL 0.1 MG/ML
0.2 INJECTION, SOLUTION INTRAVENOUS
Status: CANCELLED | OUTPATIENT
Start: 2019-08-07 | End: 2019-08-08

## 2019-08-07 RX ORDER — ONDANSETRON 2 MG/ML
4 INJECTION INTRAMUSCULAR; INTRAVENOUS
Status: CANCELLED | OUTPATIENT
Start: 2019-08-07

## 2019-08-23 ENCOUNTER — ANESTHESIA EVENT (OUTPATIENT)
Dept: SURGERY | Facility: OTHER | Age: 54
End: 2019-08-23
Payer: COMMERCIAL

## 2019-08-23 RX ORDER — FENTANYL CITRATE 50 UG/ML
25-50 INJECTION, SOLUTION INTRAMUSCULAR; INTRAVENOUS
Status: CANCELLED | OUTPATIENT
Start: 2019-08-23

## 2019-08-26 ENCOUNTER — HOSPITAL ENCOUNTER (OUTPATIENT)
Facility: OTHER | Age: 54
Discharge: HOME | End: 2019-08-26
Attending: SURGERY | Admitting: SURGERY
Payer: COMMERCIAL

## 2019-08-26 ENCOUNTER — ANESTHESIA (OUTPATIENT)
Dept: SURGERY | Facility: OTHER | Age: 54
End: 2019-08-26
Payer: COMMERCIAL

## 2019-08-26 VITALS
DIASTOLIC BLOOD PRESSURE: 93 MMHG | RESPIRATION RATE: 16 BRPM | TEMPERATURE: 97.4 F | SYSTOLIC BLOOD PRESSURE: 115 MMHG | OXYGEN SATURATION: 99 % | HEART RATE: 99 BPM

## 2019-08-26 PROBLEM — K63.5 COLON POLYPS: Status: ACTIVE | Noted: 2019-08-26

## 2019-08-26 PROCEDURE — 45380 COLONOSCOPY AND BIOPSY: CPT | Performed by: SURGERY

## 2019-08-26 PROCEDURE — 25000125 ZZHC RX 250: Performed by: NURSE ANESTHETIST, CERTIFIED REGISTERED

## 2019-08-26 PROCEDURE — 45385 COLONOSCOPY W/LESION REMOVAL: CPT | Mod: PT | Performed by: SURGERY

## 2019-08-26 PROCEDURE — 45385 COLONOSCOPY W/LESION REMOVAL: CPT | Mod: PT

## 2019-08-26 PROCEDURE — 45384 COLONOSCOPY W/LESION REMOVAL: CPT | Mod: PT,XU | Performed by: SURGERY

## 2019-08-26 PROCEDURE — 25000128 H RX IP 250 OP 636: Performed by: NURSE ANESTHETIST, CERTIFIED REGISTERED

## 2019-08-26 PROCEDURE — 40000010 ZZH STATISTIC ANES STAT CODE-CRNA PER MINUTE: Performed by: SURGERY

## 2019-08-26 PROCEDURE — 45384 COLONOSCOPY W/LESION REMOVAL: CPT | Mod: PT | Performed by: SURGERY

## 2019-08-26 PROCEDURE — 25800030 ZZH RX IP 258 OP 636: Performed by: NURSE ANESTHETIST, CERTIFIED REGISTERED

## 2019-08-26 PROCEDURE — 45385 COLONOSCOPY W/LESION REMOVAL: CPT | Performed by: NURSE ANESTHETIST, CERTIFIED REGISTERED

## 2019-08-26 PROCEDURE — 25000125 ZZHC RX 250: Performed by: SURGERY

## 2019-08-26 PROCEDURE — 88305 TISSUE EXAM BY PATHOLOGIST: CPT

## 2019-08-26 PROCEDURE — 25000132 ZZH RX MED GY IP 250 OP 250 PS 637: Performed by: NURSE ANESTHETIST, CERTIFIED REGISTERED

## 2019-08-26 PROCEDURE — 25000132 ZZH RX MED GY IP 250 OP 250 PS 637: Performed by: SURGERY

## 2019-08-26 RX ORDER — SODIUM CHLORIDE, SODIUM LACTATE, POTASSIUM CHLORIDE, CALCIUM CHLORIDE 600; 310; 30; 20 MG/100ML; MG/100ML; MG/100ML; MG/100ML
INJECTION, SOLUTION INTRAVENOUS CONTINUOUS
Status: DISCONTINUED | OUTPATIENT
Start: 2019-08-26 | End: 2019-08-26 | Stop reason: HOSPADM

## 2019-08-26 RX ORDER — IBUPROFEN 200 MG
800 TABLET ORAL ONCE
Status: COMPLETED | OUTPATIENT
Start: 2019-08-26 | End: 2019-08-26

## 2019-08-26 RX ORDER — SIMETHICONE 40MG/0.6ML
SUSPENSION, DROPS(FINAL DOSAGE FORM)(ML) ORAL PRN
Status: DISCONTINUED | OUTPATIENT
Start: 2019-08-26 | End: 2019-08-26 | Stop reason: HOSPADM

## 2019-08-26 RX ORDER — ONDANSETRON 2 MG/ML
4 INJECTION INTRAMUSCULAR; INTRAVENOUS
Status: DISCONTINUED | OUTPATIENT
Start: 2019-08-26 | End: 2019-08-26 | Stop reason: HOSPADM

## 2019-08-26 RX ORDER — LIDOCAINE HYDROCHLORIDE 20 MG/ML
INJECTION, SOLUTION INFILTRATION; PERINEURAL PRN
Status: DISCONTINUED | OUTPATIENT
Start: 2019-08-26 | End: 2019-08-26

## 2019-08-26 RX ORDER — HYDROMORPHONE HYDROCHLORIDE 1 MG/ML
.3-.5 INJECTION, SOLUTION INTRAMUSCULAR; INTRAVENOUS; SUBCUTANEOUS EVERY 10 MIN PRN
Status: DISCONTINUED | OUTPATIENT
Start: 2019-08-26 | End: 2019-08-26 | Stop reason: HOSPADM

## 2019-08-26 RX ORDER — ONDANSETRON 4 MG/1
4 TABLET, ORALLY DISINTEGRATING ORAL EVERY 30 MIN PRN
Status: DISCONTINUED | OUTPATIENT
Start: 2019-08-26 | End: 2019-08-26 | Stop reason: HOSPADM

## 2019-08-26 RX ORDER — MEPERIDINE HYDROCHLORIDE 50 MG/ML
12.5 INJECTION INTRAMUSCULAR; INTRAVENOUS; SUBCUTANEOUS
Status: DISCONTINUED | OUTPATIENT
Start: 2019-08-26 | End: 2019-08-26 | Stop reason: HOSPADM

## 2019-08-26 RX ORDER — NALOXONE HYDROCHLORIDE 0.4 MG/ML
.1-.4 INJECTION, SOLUTION INTRAMUSCULAR; INTRAVENOUS; SUBCUTANEOUS
Status: DISCONTINUED | OUTPATIENT
Start: 2019-08-26 | End: 2019-08-26 | Stop reason: HOSPADM

## 2019-08-26 RX ORDER — FLUMAZENIL 0.1 MG/ML
0.2 INJECTION, SOLUTION INTRAVENOUS
Status: DISCONTINUED | OUTPATIENT
Start: 2019-08-26 | End: 2019-08-26 | Stop reason: HOSPADM

## 2019-08-26 RX ORDER — PROPOFOL 10 MG/ML
INJECTION, EMULSION INTRAVENOUS PRN
Status: DISCONTINUED | OUTPATIENT
Start: 2019-08-26 | End: 2019-08-26

## 2019-08-26 RX ORDER — ONDANSETRON 2 MG/ML
4 INJECTION INTRAMUSCULAR; INTRAVENOUS EVERY 30 MIN PRN
Status: DISCONTINUED | OUTPATIENT
Start: 2019-08-26 | End: 2019-08-26 | Stop reason: HOSPADM

## 2019-08-26 RX ORDER — LIDOCAINE 40 MG/G
CREAM TOPICAL
Status: DISCONTINUED | OUTPATIENT
Start: 2019-08-26 | End: 2019-08-26 | Stop reason: HOSPADM

## 2019-08-26 RX ORDER — FENTANYL CITRATE 50 UG/ML
25 INJECTION, SOLUTION INTRAMUSCULAR; INTRAVENOUS
Status: DISCONTINUED | OUTPATIENT
Start: 2019-08-26 | End: 2019-08-26 | Stop reason: HOSPADM

## 2019-08-26 RX ORDER — PROPOFOL 10 MG/ML
INJECTION, EMULSION INTRAVENOUS CONTINUOUS PRN
Status: DISCONTINUED | OUTPATIENT
Start: 2019-08-26 | End: 2019-08-26

## 2019-08-26 RX ADMIN — PROPOFOL 100 MG: 10 INJECTION, EMULSION INTRAVENOUS at 08:30

## 2019-08-26 RX ADMIN — LIDOCAINE HYDROCHLORIDE 60 MG: 20 INJECTION, SOLUTION INFILTRATION; PERINEURAL at 08:30

## 2019-08-26 RX ADMIN — PROPOFOL 135 MCG/KG/MIN: 10 INJECTION, EMULSION INTRAVENOUS at 08:30

## 2019-08-26 RX ADMIN — IBUPROFEN 800 MG: 200 TABLET, FILM COATED ORAL at 09:28

## 2019-08-26 RX ADMIN — SODIUM CHLORIDE, POTASSIUM CHLORIDE, SODIUM LACTATE AND CALCIUM CHLORIDE: 600; 310; 30; 20 INJECTION, SOLUTION INTRAVENOUS at 08:01

## 2019-08-26 NOTE — DISCHARGE INSTRUCTIONS
[facility] Procedure   Adult Discharge Orders & Instructions      For 24 hours after surgery:  1. Get plenty of rest.  A responsible adult must stay with you for at least 24 hours after you leave the hospital.   2. You may feel lightheaded.  IF so, sit for a few minutes before standing.  Have someone help you get up.   3. You may have a slight fever. Call the doctor if your fever is over 101 F (38.3 C) (taken under the tongue) or lasts longer than 24 hours.  4. You may have a dry mouth, a sore throat, muscle aches or trouble sleeping.  These should go away after 24 hours.  5. Do not make important or legal decisions.  6.   Do not drive or use heavy equipment.  If you have weakness or tingling, don't drive or use heavy equipment until this feeling goes away.                                                                                                                                                                         To contact a doctor, call    [phone]______________

## 2019-08-26 NOTE — INTERVAL H&P NOTE
History and Physical Update    The history and physical has been reviewed and the patient has been examined.  Changes to the history or physical condition are as follows:    Had much better clean out and no further blood in stool.    Louie Phelps MD

## 2019-08-26 NOTE — ANESTHESIA CARE TRANSFER NOTE
Patient: Lazaro Man    Procedure(s):  COLONOSCOPY, WITH POLYPECTOMY AND BIOPSY    Diagnosis: blood in stool  Diagnosis Additional Information: No value filed.    Anesthesia Type:   MAC     Note:  Airway :Room Air  Patient transferred to:Phase II  Handoff Report: Identifed the Patient, Identified the Reponsible Provider, Reviewed the pertinent medical history, Discussed the surgical course, Reviewed Intra-OP anesthesia mangement and issues during anesthesia, Set expectations for post-procedure period and Allowed opportunity for questions and acknowledgement of understanding      Vitals: (Last set prior to Anesthesia Care Transfer)    CRNA VITALS  8/26/2019 0835 - 8/26/2019 0907      8/26/2019             Resp Rate (set):  10                Electronically Signed By: ANTONIETA ALONSO CRNA  August 26, 2019  9:07 AM

## 2019-08-26 NOTE — ANESTHESIA POSTPROCEDURE EVALUATION
Patient: Lazaro Man    Procedure(s):  COLONOSCOPY, WITH POLYPECTOMY AND BIOPSY    Diagnosis:blood in stool  Diagnosis Additional Information: No value filed.    Anesthesia Type:  MAC    Note:  Anesthesia Post Evaluation    Patient location during evaluation: Phase 2  Patient participation: Able to fully participate in evaluation  Level of consciousness: awake and alert  Pain management: adequate  Airway patency: patent  Cardiovascular status: acceptable  Respiratory status: acceptable  Hydration status: acceptable  PONV: none     Anesthetic complications: None          Last vitals:  Vitals:    08/26/19 0751 08/26/19 0908   BP: 124/88 (!) 89/60   Resp: 16 18   Temp: 97  F (36.1  C) 97.4  F (36.3  C)   SpO2: 96% 91%         Electronically Signed By: ANTONIETA ALONSO CRNA  August 26, 2019  9:49 AM

## 2019-08-26 NOTE — OP NOTE
PROCEDURE NOTE    DATE OF SERVICE: 8/26/2019    SURGEON: Louie Phelps MD    PRE-OP DIAGNOSIS:    History of Polyps  Rectal bleeding    POST-OP DIAGNOSIS:  Same  Sigmoid Diverticulosis  Polyps at HF and 35 cm    PROCEDURE:     Colonoscopy with snare polypectomy        ANESTHESIA:  MACIE Marlow CRNA    INDICATION FOR THE PROCEDURE: The patient is a 54 year old male with blood in stool . The patient has no other complaints  . After explaining the risks to include bleeding, perforation, potential inability toreach the cecum, the patient wished to proceed.    PROCEDURE:After adequate sedation, the patient was in the left lateral decubitus position.  Rectal exam was performed.  There was normal tone and no palpable masses .  The colonoscope was introduced into the rectum and advanced to the cecum with Mild difficulty.  The patient's prep was fair as gooey adherent bile present throughout that was difficult to clear.  The terminal cecum was reached.  The cecum, ascending, transverse, descending and sigmoid colon was with sigmoid diverticulosis and with flat over 1 cm polyp at HF that was completley removed in pieces by snare and diminutive polyp at 35 cm that was hot biopsied and destroyed .  The scope was retroflexed in the rectum.  The rectum was unremarkable  .  The scope was straightened and removed.  The patient tolerated the procedure well.     ESTIMATED BLOOD LOSS: none    COMPLICATIONS:  None    TISSUE REMOVED:  Yes    RECOMMEND:      Follow-up pending pathology  Fiber  Given literature on diverticulosis    Louie Phelps MD FACS

## 2019-08-28 PROBLEM — K63.5 COLON POLYPS: Status: RESOLVED | Noted: 2019-08-26 | Resolved: 2019-08-28

## 2019-08-28 PROBLEM — K92.1 BLOOD IN STOOL: Status: RESOLVED | Noted: 2019-07-10 | Resolved: 2019-08-28

## 2020-02-03 ENCOUNTER — OFFICE VISIT (OUTPATIENT)
Dept: FAMILY MEDICINE | Facility: OTHER | Age: 55
End: 2020-02-03
Attending: PHYSICIAN ASSISTANT
Payer: COMMERCIAL

## 2020-02-03 VITALS
OXYGEN SATURATION: 97 % | HEIGHT: 72 IN | BODY MASS INDEX: 32.26 KG/M2 | SYSTOLIC BLOOD PRESSURE: 130 MMHG | WEIGHT: 238.2 LBS | DIASTOLIC BLOOD PRESSURE: 90 MMHG | RESPIRATION RATE: 18 BRPM | TEMPERATURE: 98.6 F | HEART RATE: 86 BPM

## 2020-02-03 DIAGNOSIS — K52.9 CHRONIC DIARRHEA: Primary | ICD-10-CM

## 2020-02-03 PROCEDURE — 99213 OFFICE O/P EST LOW 20 MIN: CPT | Performed by: PHYSICIAN ASSISTANT

## 2020-02-03 PROCEDURE — G0463 HOSPITAL OUTPT CLINIC VISIT: HCPCS

## 2020-02-03 ASSESSMENT — ENCOUNTER SYMPTOMS
ABDOMINAL PAIN: 1
HEARTBURN: 1
CONSTITUTIONAL NEGATIVE: 1
VOMITING: 0
HEMATOCHEZIA: 0
DIARRHEA: 1
RECTAL PAIN: 0

## 2020-02-03 ASSESSMENT — PAIN SCALES - GENERAL: PAINLEVEL: MILD PAIN (3)

## 2020-02-03 ASSESSMENT — MIFFLIN-ST. JEOR: SCORE: 1948.47

## 2020-02-03 NOTE — NURSING NOTE
Patient presents to the clinic for forms to be filled.  Medication Reconciliation Completed.    Matthew Ruiz LPN  2/3/2020 12:42 PM

## 2020-08-10 NOTE — PROGRESS NOTES
Nursing Notes:   Raquel Eaton LPN  8/11/2020  9:19 AM  Signed  Chief Complaint   Patient presents with     Physical     Establish Care   Patient presents to the clinic today for a physical and to establish carre  Medication Reconciliation: completed   Raquel Eaton LPN  8/11/2020 9:02 AM      Nursing note reviewed with patient.  Accuracy and completeness verified.    SUBJECTIVE:                                                      Lazaro Man is a 55 year old male patient who presents to the clinic today to establish care with Ana/[facility] team. He is overdue for vaccinations and age appropriate recommended lab screenings.    Past medical history is significant for diverticulosis of sigmoid colon, gastroduodenitis, Jennings's esophagus without dysplasia, benign essential hypertension, history of closed fracture of proximate end of right humerus, prolonged depressive reaction and stress and adjustment reaction, family history positive for malignant neoplasm of GI tract, history of adenomatous polyp of colon, history of peptic ulcer disease and chronic anxiety.    Problem List/PMH: Reviewed in EMR, and made relevant updates today.  Medications: Reviewed in EMR, and made relevant updates today.  Allergies: Reviewed in EMR, and made relevant updates today.    REVIEW OF RECOMMENDED SCREENINGS:    Colonoscopy: Done in 8/26/2019 and was normal, repeat in three years - 08/2023.    PRECIOUS/PSA: To be completed if symptomatic and per patient request, will continue to discuss this with them at future visits    AAA screen: not age appropriate.    Immunizations: Due for TDAP, zoster, and pneumococcal immunization. TDAP will be given today. UTD on other vaccines.     Lipids/Annual Exam: Done in 2018 and triglycerides were high, otherwise normal, will repeat today.    Hepatitis C screen: Never done, ordered for today.    Current Code Status:  No Order    REVIEW OF SYSTEMS:    Review of Systems   Constitutional: Positive for  "unexpected weight change (gain since March).   HENT: Positive for hearing loss.         Overdue for dental exam. Does have established provider however.   Eyes:        Wears contacts, goes yearly.   Respiratory:        Cigarette smoker, trying to quit. Sporadic smoker. Uses inhaler if needed.   Gastrointestinal: Positive for abdominal pain (intestinal cramping) and diarrhea.        Urgency, IBS symptoms   Musculoskeletal: Positive for arthralgias (chronic age related, prior injuries).   Psychiatric/Behavioral: Positive for dysphoric mood and sleep disturbance. Negative for self-injury and suicidal ideas. The patient is nervous/anxious.    All other systems reviewed and are negative.      OBJECTIVE:                                                      EXAM:     /82 (BP Location: Right arm, Patient Position: Sitting, Cuff Size: Adult Large)   Pulse 95   Temp 97.6  F (36.4  C) (Tympanic)   Resp 16   Ht 1.803 m (5' 11\")   Wt 107.6 kg (237 lb 3.2 oz)   SpO2 98%   BMI 33.08 kg/m      Current Pain Score: Mild Pain (3)     Physical Exam  Vitals signs and nursing note reviewed.   Constitutional:       Appearance: Normal appearance.   HENT:      Head: Normocephalic and atraumatic.      Right Ear: There is impacted cerumen.      Left Ear: There is impacted cerumen.      Nose: Nose normal.      Mouth/Throat:      Mouth: Mucous membranes are moist.      Pharynx: Oropharynx is clear.   Eyes:      Extraocular Movements: Extraocular movements intact.      Conjunctiva/sclera: Conjunctivae normal.      Pupils: Pupils are equal, round, and reactive to light.   Neck:      Musculoskeletal: Normal range of motion and neck supple.   Cardiovascular:      Rate and Rhythm: Normal rate and regular rhythm.      Heart sounds: Normal heart sounds.   Pulmonary:      Effort: Pulmonary effort is normal.      Breath sounds: Normal breath sounds.   Abdominal:      General: Bowel sounds are normal.      Palpations: Abdomen is soft. "   Musculoskeletal: Normal range of motion.   Skin:     General: Skin is warm and dry.   Neurological:      Mental Status: He is alert and oriented to person, place, and time. Mental status is at baseline.   Psychiatric:         Attention and Perception: Attention normal.         Mood and Affect: Mood is anxious.         Speech: Speech normal.         Behavior: Behavior normal. Behavior is cooperative.         Thought Content: Thought content normal.         Cognition and Memory: Cognition and memory normal.         Judgment: Judgment normal.     Diagnostics Completed at this Visit:  Results for orders placed or performed in visit on 08/11/20   Lipid Profile     Status: None   Result Value Ref Range    Cholesterol 150 <200 mg/dL    Triglycerides 111 <150 mg/dL    HDL Cholesterol 56 23 - 92 mg/dL    LDL Cholesterol Calculated 72 <100 mg/dL    Non HDL Cholesterol 94 <130 mg/dL   Comprehensive metabolic panel     Status: Abnormal   Result Value Ref Range    Sodium 137 134 - 144 mmol/L    Potassium 4.4 3.5 - 5.1 mmol/L    Chloride 101 98 - 107 mmol/L    Carbon Dioxide 29 21 - 31 mmol/L    Anion Gap 7 3 - 14 mmol/L    Glucose 115 (H) 70 - 105 mg/dL    Urea Nitrogen 9 7 - 25 mg/dL    Creatinine 0.78 0.70 - 1.30 mg/dL    GFR Estimate >90 >60 mL/min/[1.73_m2]    GFR Estimate If Black >90 >60 mL/min/[1.73_m2]    Calcium 9.8 8.6 - 10.3 mg/dL    Bilirubin Total 0.6 0.3 - 1.0 mg/dL    Albumin 4.5 3.5 - 5.7 g/dL    Protein Total 7.6 6.4 - 8.9 g/dL    Alkaline Phosphatase 73 34 - 104 U/L    ALT 26 7 - 52 U/L    AST 25 13 - 39 U/L   CBC with platelets differential     Status: Abnormal   Result Value Ref Range    WBC 11.5 (H) 4.0 - 11.0 10e9/L    RBC Count 5.00 4.4 - 5.9 10e12/L    Hemoglobin 16.9 13.3 - 17.7 g/dL    Hematocrit 49.1 40.0 - 53.0 %    MCV 98 78 - 100 fl    MCH 33.8 (H) 26.5 - 33.0 pg    MCHC 34.4 31.5 - 36.5 g/dL    RDW 13.1 10.0 - 15.0 %    Platelet Count 248 150 - 450 10e9/L    Diff Method Automated Method     %  Neutrophils 68.2 %    % Lymphocytes 22.4 %    % Monocytes 7.7 %    % Eosinophils 1.0 %    % Basophils 0.4 %    % Immature Granulocytes 0.3 %    Absolute Neutrophil 7.9 1.6 - 8.3 10e9/L    Absolute Lymphocytes 2.6 0.8 - 5.3 10e9/L    Absolute Monocytes 0.9 0.0 - 1.3 10e9/L    Absolute Eosinophils 0.1 0.0 - 0.7 10e9/L    Absolute Basophils 0.1 0.0 - 0.2 10e9/L    Abs Immature Granulocytes 0.0 0 - 0.4 10e9/L   HIV Antigen Antibody Combo     Status: None   Result Value Ref Range    HIV Antigen Antibody Combo Nonreactive NR^Nonreactive       Hepatitis C antibody     Status: None   Result Value Ref Range    Hepatitis C Antibody Nonreactive NR^Nonreactive        ASSESSMENT/PLAN:                                                      1. Encounter for annual physical exam  Recommended annual age appropriate screenings and immunizations reviewed and ordered as necessary.  - Hepatitis C antibody; negative  - HIV Antigen Antibody Combo; nonreactive  - CBC with platelets differential; unremarkable  - Comprehensive metabolic panel; unremarkable  - Lipid Profile; within normal ranges    2. Screening for condition  - Lipid Profile; within normal ranges  - Comprehensive metabolic panel; unremarkable  - CBC with platelets differential; unremarkable  - HIV Antigen Antibody Combo; nonreactive  - Hepatitis C antibody; negative    3. Jennings's esophagus without dysplasia  Managed with diet control.    4. Anxiety  Gene sight testing completed today as patient has failed three medications now for anxiety.  - MENTAL HEALTH REFERRAL  - Adult; Outpatient Treatment; Individual/Couples/Family/Group Therapy/Health Psychology; Other: [facility] [phone]; We will contact you to schedule the appointment or please call with any questions    5. Benign essential hypertension  Managed with lisinopril/hydrochlorothiazide 10-12.5 mg daily and exercise.    6. History of peptic ulcer disease  Stable.    7. H/O adenomatous polyp of  colon  Continue recommended colonoscopies.    8. Prolonged depressive reaction  Gene sight testing completed today as patient has failed three medications now for depression.  - MENTAL HEALTH REFERRAL  - Adult; Outpatient Treatment; Individual/Couples/Family/Group Therapy/Health Psychology; Other: [facility] [phone]; We will contact you to schedule the appointment or please call with any questions    9. Closed fracture of proximal end of right humerus with routine healing, unspecified fracture morphology, subsequent encounter    10. Need for immunization against tetanus alone  - GH IMM-  TDAP VACCINE (BOOSTRIX )    11. Bilateral impacted cerumen  - carbamide peroxide (DEBROX) 6.5 % otic solution; Place 5 drops into both ears daily as needed for other (impacted cerumen)  Dispense: 15 mL; Refill: 3    12. Persistent insomnia  Will start trazadone today.  - traZODone (DESYREL) 50 MG tablet; Take 1 tablet (50 mg) by mouth At Bedtime  Dispense: 7 tablet; Refill: 0  - MENTAL HEALTH REFERRAL  - Adult; Outpatient Treatment; Individual/Couples/Family/Group Therapy/Health Psychology; Other: [facility] [phone]; We will contact you to schedule the appointment or please call with any questions    13. Moderate episode of recurrent major depressive disorder (H)  Will start trazadone today. Patient to - traZODone (DESYREL) 50 MG tablet; Take 1 tablet (50 mg) by mouth At Bedtime  Dispense: 7 tablet; Refill: 0  - MENTAL HEALTH REFERRAL  - Adult; Outpatient Treatment; Individual/Couples/Family/Group Therapy/Health Psychology; Other: [facility] [phone]; We will contact you to schedule the appointment or please call with any questions    Patient verbalizes understanding and is agreeable to plan of care.    Return in about 1 year (around 8/11/2021) for Physical Exam, Lab Work.       ANTONIETA Llamas, AGNP-C  Internal Medicine  [facility]    08/12/2020 3:17  PM    Portions of this note were dictated using speech recognition software. The note has been proofread but errors in the text may have been overlooked. Please contact me if there are any concerns regarding the accuracy of the dictation.

## 2020-08-11 ENCOUNTER — TRANSFERRED RECORDS (OUTPATIENT)
Dept: HEALTH INFORMATION MANAGEMENT | Facility: OTHER | Age: 55
End: 2020-08-11

## 2020-08-11 ENCOUNTER — OFFICE VISIT (OUTPATIENT)
Dept: INTERNAL MEDICINE | Facility: OTHER | Age: 55
End: 2020-08-11
Attending: NURSE PRACTITIONER
Payer: COMMERCIAL

## 2020-08-11 VITALS
RESPIRATION RATE: 16 BRPM | BODY MASS INDEX: 33.21 KG/M2 | SYSTOLIC BLOOD PRESSURE: 138 MMHG | TEMPERATURE: 97.6 F | WEIGHT: 237.2 LBS | HEIGHT: 71 IN | OXYGEN SATURATION: 98 % | DIASTOLIC BLOOD PRESSURE: 82 MMHG | HEART RATE: 95 BPM

## 2020-08-11 DIAGNOSIS — Z00.00 ENCOUNTER FOR ANNUAL PHYSICAL EXAM: Primary | ICD-10-CM

## 2020-08-11 DIAGNOSIS — S42.201D CLOSED FRACTURE OF PROXIMAL END OF RIGHT HUMERUS WITH ROUTINE HEALING, UNSPECIFIED FRACTURE MORPHOLOGY, SUBSEQUENT ENCOUNTER: ICD-10-CM

## 2020-08-11 DIAGNOSIS — Z13.9 SCREENING FOR CONDITION: ICD-10-CM

## 2020-08-11 DIAGNOSIS — Z86.0101 H/O ADENOMATOUS POLYP OF COLON: ICD-10-CM

## 2020-08-11 DIAGNOSIS — F41.9 ANXIETY: ICD-10-CM

## 2020-08-11 DIAGNOSIS — I10 BENIGN ESSENTIAL HYPERTENSION: ICD-10-CM

## 2020-08-11 DIAGNOSIS — H61.23 BILATERAL IMPACTED CERUMEN: ICD-10-CM

## 2020-08-11 DIAGNOSIS — K22.70 BARRETT'S ESOPHAGUS WITHOUT DYSPLASIA: ICD-10-CM

## 2020-08-11 DIAGNOSIS — Z87.11 HISTORY OF PEPTIC ULCER DISEASE: ICD-10-CM

## 2020-08-11 DIAGNOSIS — F33.1 MODERATE EPISODE OF RECURRENT MAJOR DEPRESSIVE DISORDER (H): ICD-10-CM

## 2020-08-11 DIAGNOSIS — Z23 NEED FOR IMMUNIZATION AGAINST TETANUS ALONE: ICD-10-CM

## 2020-08-11 DIAGNOSIS — G47.00 PERSISTENT INSOMNIA: ICD-10-CM

## 2020-08-11 DIAGNOSIS — F32.9 PROLONGED DEPRESSIVE REACTION: ICD-10-CM

## 2020-08-11 PROBLEM — R10.13 EPIGASTRIC PAIN: Status: RESOLVED | Noted: 2019-07-10 | Resolved: 2020-08-11

## 2020-08-11 PROBLEM — K29.90 GASTRODUODENITIS: Status: RESOLVED | Noted: 2019-07-29 | Resolved: 2020-08-11

## 2020-08-11 LAB
ALBUMIN SERPL-MCNC: 4.5 G/DL (ref 3.5–5.7)
ALP SERPL-CCNC: 73 U/L (ref 34–104)
ALT SERPL W P-5'-P-CCNC: 26 U/L (ref 7–52)
ANION GAP SERPL CALCULATED.3IONS-SCNC: 7 MMOL/L (ref 3–14)
AST SERPL W P-5'-P-CCNC: 25 U/L (ref 13–39)
BASOPHILS # BLD AUTO: 0.1 10E9/L (ref 0–0.2)
BASOPHILS NFR BLD AUTO: 0.4 %
BILIRUB SERPL-MCNC: 0.6 MG/DL (ref 0.3–1)
BUN SERPL-MCNC: 9 MG/DL (ref 7–25)
CALCIUM SERPL-MCNC: 9.8 MG/DL (ref 8.6–10.3)
CHLORIDE SERPL-SCNC: 101 MMOL/L (ref 98–107)
CHOLEST SERPL-MCNC: 150 MG/DL
CO2 SERPL-SCNC: 29 MMOL/L (ref 21–31)
CREAT SERPL-MCNC: 0.78 MG/DL (ref 0.7–1.3)
DIFFERENTIAL METHOD BLD: ABNORMAL
EOSINOPHIL # BLD AUTO: 0.1 10E9/L (ref 0–0.7)
EOSINOPHIL NFR BLD AUTO: 1 %
ERYTHROCYTE [DISTWIDTH] IN BLOOD BY AUTOMATED COUNT: 13.1 % (ref 10–15)
GFR SERPL CREATININE-BSD FRML MDRD: >90 ML/MIN/{1.73_M2}
GLUCOSE SERPL-MCNC: 115 MG/DL (ref 70–105)
HCT VFR BLD AUTO: 49.1 % (ref 40–53)
HDLC SERPL-MCNC: 56 MG/DL (ref 23–92)
HGB BLD-MCNC: 16.9 G/DL (ref 13.3–17.7)
IMM GRANULOCYTES # BLD: 0 10E9/L (ref 0–0.4)
IMM GRANULOCYTES NFR BLD: 0.3 %
LDLC SERPL CALC-MCNC: 72 MG/DL
LYMPHOCYTES # BLD AUTO: 2.6 10E9/L (ref 0.8–5.3)
LYMPHOCYTES NFR BLD AUTO: 22.4 %
MCH RBC QN AUTO: 33.8 PG (ref 26.5–33)
MCHC RBC AUTO-ENTMCNC: 34.4 G/DL (ref 31.5–36.5)
MCV RBC AUTO: 98 FL (ref 78–100)
MONOCYTES # BLD AUTO: 0.9 10E9/L (ref 0–1.3)
MONOCYTES NFR BLD AUTO: 7.7 %
NEUTROPHILS # BLD AUTO: 7.9 10E9/L (ref 1.6–8.3)
NEUTROPHILS NFR BLD AUTO: 68.2 %
NONHDLC SERPL-MCNC: 94 MG/DL
PLATELET # BLD AUTO: 248 10E9/L (ref 150–450)
POTASSIUM SERPL-SCNC: 4.4 MMOL/L (ref 3.5–5.1)
PROT SERPL-MCNC: 7.6 G/DL (ref 6.4–8.9)
RBC # BLD AUTO: 5 10E12/L (ref 4.4–5.9)
SODIUM SERPL-SCNC: 137 MMOL/L (ref 134–144)
TRIGL SERPL-MCNC: 111 MG/DL
WBC # BLD AUTO: 11.5 10E9/L (ref 4–11)

## 2020-08-11 PROCEDURE — G0463 HOSPITAL OUTPT CLINIC VISIT: HCPCS

## 2020-08-11 PROCEDURE — 80053 COMPREHEN METABOLIC PANEL: CPT | Mod: ZL | Performed by: NURSE PRACTITIONER

## 2020-08-11 PROCEDURE — 99396 PREV VISIT EST AGE 40-64: CPT | Performed by: NURSE PRACTITIONER

## 2020-08-11 PROCEDURE — 80061 LIPID PANEL: CPT | Mod: ZL | Performed by: NURSE PRACTITIONER

## 2020-08-11 PROCEDURE — 86803 HEPATITIS C AB TEST: CPT | Mod: ZL | Performed by: NURSE PRACTITIONER

## 2020-08-11 PROCEDURE — 85025 COMPLETE CBC W/AUTO DIFF WBC: CPT | Mod: ZL | Performed by: NURSE PRACTITIONER

## 2020-08-11 PROCEDURE — 87389 HIV-1 AG W/HIV-1&-2 AB AG IA: CPT | Mod: ZL | Performed by: NURSE PRACTITIONER

## 2020-08-11 PROCEDURE — 90471 IMMUNIZATION ADMIN: CPT

## 2020-08-11 PROCEDURE — 90715 TDAP VACCINE 7 YRS/> IM: CPT

## 2020-08-11 PROCEDURE — 36415 COLL VENOUS BLD VENIPUNCTURE: CPT | Mod: ZL | Performed by: NURSE PRACTITIONER

## 2020-08-11 RX ORDER — TRAZODONE HYDROCHLORIDE 50 MG/1
50 TABLET, FILM COATED ORAL AT BEDTIME
Qty: 7 TABLET | Refills: 0 | Status: SHIPPED | OUTPATIENT
Start: 2020-08-11 | End: 2020-11-11

## 2020-08-11 ASSESSMENT — ANXIETY QUESTIONNAIRES
6. BECOMING EASILY ANNOYED OR IRRITABLE: NEARLY EVERY DAY
5. BEING SO RESTLESS THAT IT IS HARD TO SIT STILL: NOT AT ALL
7. FEELING AFRAID AS IF SOMETHING AWFUL MIGHT HAPPEN: NEARLY EVERY DAY
IF YOU CHECKED OFF ANY PROBLEMS ON THIS QUESTIONNAIRE, HOW DIFFICULT HAVE THESE PROBLEMS MADE IT FOR YOU TO DO YOUR WORK, TAKE CARE OF THINGS AT HOME, OR GET ALONG WITH OTHER PEOPLE: EXTREMELY DIFFICULT
GAD7 TOTAL SCORE: 18
3. WORRYING TOO MUCH ABOUT DIFFERENT THINGS: NEARLY EVERY DAY
1. FEELING NERVOUS, ANXIOUS, OR ON EDGE: NEARLY EVERY DAY
2. NOT BEING ABLE TO STOP OR CONTROL WORRYING: NEARLY EVERY DAY

## 2020-08-11 ASSESSMENT — PATIENT HEALTH QUESTIONNAIRE - PHQ9
SUM OF ALL RESPONSES TO PHQ QUESTIONS 1-9: 16
5. POOR APPETITE OR OVEREATING: NEARLY EVERY DAY

## 2020-08-11 ASSESSMENT — ENCOUNTER SYMPTOMS
DYSPHORIC MOOD: 1
NERVOUS/ANXIOUS: 1
ABDOMINAL PAIN: 1
SLEEP DISTURBANCE: 1
DIARRHEA: 1
ARTHRALGIAS: 1
UNEXPECTED WEIGHT CHANGE: 1

## 2020-08-11 ASSESSMENT — MIFFLIN-ST. JEOR: SCORE: 1923.06

## 2020-08-11 ASSESSMENT — PAIN SCALES - GENERAL: PAINLEVEL: MILD PAIN (3)

## 2020-08-11 NOTE — NURSING NOTE
Chief Complaint   Patient presents with     Physical     Establish Care   Patient presents to the clinic today for a physical and to establish carre  Medication Reconciliation: completed   Raquel Eaton LPN  8/11/2020 9:02 AM

## 2020-08-11 NOTE — LETTER
August 13, 2020      Lazaro Man  [address] [address]        Dear ,    We are writing to inform you of your test results. Your cholesterol levels are great! Keep up the good work on diet and exercise. The rest of your labs are unconcerning. You will see a very slightly elevated WBC but I am not concerned at this point as you have no infectious symptoms. We will readdress if you have any issues in the near future. It was so nice to meet you and feel free to reach out if you have any questions or concerns at all.    Resulted Orders   Lipid Profile   Result Value Ref Range    Cholesterol 150 <200 mg/dL    Triglycerides 111 <150 mg/dL    HDL Cholesterol 56 23 - 92 mg/dL    LDL Cholesterol Calculated 72 <100 mg/dL      Comment:      Desirable:       <100 mg/dl    Non HDL Cholesterol 94 <130 mg/dL   Comprehensive metabolic panel   Result Value Ref Range    Sodium 137 134 - 144 mmol/L    Potassium 4.4 3.5 - 5.1 mmol/L    Chloride 101 98 - 107 mmol/L    Carbon Dioxide 29 21 - 31 mmol/L    Anion Gap 7 3 - 14 mmol/L    Glucose 115 (H) 70 - 105 mg/dL    Urea Nitrogen 9 7 - 25 mg/dL    Creatinine 0.78 0.70 - 1.30 mg/dL    GFR Estimate >90 >60 mL/min/[1.73_m2]    GFR Estimate If Black >90 >60 mL/min/[1.73_m2]    Calcium 9.8 8.6 - 10.3 mg/dL    Bilirubin Total 0.6 0.3 - 1.0 mg/dL    Albumin 4.5 3.5 - 5.7 g/dL    Protein Total 7.6 6.4 - 8.9 g/dL    Alkaline Phosphatase 73 34 - 104 U/L    ALT 26 7 - 52 U/L    AST 25 13 - 39 U/L   CBC with platelets differential   Result Value Ref Range    WBC 11.5 (H) 4.0 - 11.0 10e9/L    RBC Count 5.00 4.4 - 5.9 10e12/L    Hemoglobin 16.9 13.3 - 17.7 g/dL    Hematocrit 49.1 40.0 - 53.0 %    MCV 98 78 - 100 fl    MCH 33.8 (H) 26.5 - 33.0 pg    MCHC 34.4 31.5 - 36.5 g/dL    RDW 13.1 10.0 - 15.0 %    Platelet Count 248 150 - 450 10e9/L    Diff Method Automated Method     % Neutrophils 68.2 %    % Lymphocytes 22.4 %    % Monocytes 7.7 %    % Eosinophils 1.0 %    %  Basophils 0.4 %    % Immature Granulocytes 0.3 %    Absolute Neutrophil 7.9 1.6 - 8.3 10e9/L    Absolute Lymphocytes 2.6 0.8 - 5.3 10e9/L    Absolute Monocytes 0.9 0.0 - 1.3 10e9/L    Absolute Eosinophils 0.1 0.0 - 0.7 10e9/L    Absolute Basophils 0.1 0.0 - 0.2 10e9/L    Abs Immature Granulocytes 0.0 0 - 0.4 10e9/L     If you have any questions or concerns, please call the clinic at the number listed above.     Sincerely,        Alyse Mcpherson NP

## 2020-08-12 LAB
HCV AB SERPL QL IA: NONREACTIVE
HIV 1+2 AB+HIV1 P24 AG SERPL QL IA: NONREACTIVE

## 2020-08-12 ASSESSMENT — ANXIETY QUESTIONNAIRES: GAD7 TOTAL SCORE: 18

## 2020-11-08 NOTE — PROGRESS NOTES
"Nursing Notes:   Jesusita Casillas LPN  11/11/2020  2:31 PM  Signed  Chief Complaint   Patient presents with     RECHECK       Initial BP (!) 140/110 (BP Location: Right arm, Patient Position: Right side, Cuff Size: Adult Large)   Pulse 102   Temp 95.8  F (35.4  C) (Tympanic)   Resp 16   Wt 108.9 kg (240 lb 1.3 oz)   SpO2 97%   BMI 33.48 kg/m   Estimated body mass index is 33.48 kg/m  as calculated from the following:    Height as of 8/11/20: 1.803 m (5' 11\").    Weight as of this encounter: 108.9 kg (240 lb 1.3 oz).  Medication Reconciliation: complete    Jesusita Casillas LPN    Nursing note reviewed with patient.  Accuracy and completeness verified.    SUBJECTIVE:                                                      Lazaro Man is a 55 year old year old male patient who presents to the clinic today for discussion regarding Gene Sight testing, in addition to follow up on previous issues addressed at his last visit. He reports he is not sleeping, feels he is isolating more which is increasing his depression. The COVID pandemic is negatively impacting his mental health. He reports episodes of waking up from a deep sleep being SOB which increases his anxiety. He reports he sometimes can go outside and the cool air helps him catch his breath. Other times he can get relief by using his inhaler.  He would like to try a new mental health med. His mom takes Zoloft and this is an appropriate medication for him per his Gene Sight testing.  SHANA Score:  SHANA-7 SCORE 9/6/2018 6/7/2019 8/11/2020   Total Score 0 14 18        PHQ-2 Score:     PHQ-2 ( 1999 Pfizer) 11/11/2020 8/11/2020   Q1: Little interest or pleasure in doing things 3 3   Q2: Feeling down, depressed or hopeless 3 3   PHQ-2 Score 6 6        Problem List/PMH: Reviewed in EMR, and made relevant updates today.  Medications: Reviewed in EMR, and made relevant updates today.  Allergies: Reviewed in EMR, and made relevant updates today.    Current Code " Status:  No Order    REVIEW OF SYSTEMS:    Review of Systems   Constitutional: Positive for activity change (due to COVID and increased anxiety, hasn't been walking as much) and fatigue.   Respiratory: Positive for shortness of breath (episodes at night waking him). Negative for cough.    Psychiatric/Behavioral: Positive for decreased concentration and sleep disturbance. Negative for self-injury and suicidal ideas. The patient is nervous/anxious and is hyperactive.    All other systems reviewed and are negative.      OBJECTIVE:                                                      EXAM:     BP (!) 140/110 (BP Location: Right arm, Patient Position: Right side, Cuff Size: Adult Large)   Pulse 102   Temp 95.8  F (35.4  C) (Tympanic)   Resp 16   Wt 108.9 kg (240 lb 1.3 oz)   SpO2 97%   BMI 33.48 kg/m      Current Pain Score: No Pain (0)     Physical Exam  Vitals signs and nursing note reviewed.   Constitutional:       Appearance: Normal appearance.   HENT:      Head: Normocephalic and atraumatic.   Eyes:      Extraocular Movements: Extraocular movements intact.      Conjunctiva/sclera: Conjunctivae normal.      Pupils: Pupils are equal, round, and reactive to light.   Cardiovascular:      Rate and Rhythm: Regular rhythm. Tachycardia present.      Heart sounds: Normal heart sounds.   Pulmonary:      Effort: Pulmonary effort is normal.      Breath sounds: Normal breath sounds.   Abdominal:      General: Bowel sounds are normal.      Palpations: Abdomen is soft.   Neurological:      Mental Status: He is alert.   Psychiatric:         Attention and Perception: Attention and perception normal.         Mood and Affect: Mood is anxious and elated.         Speech: Speech is rapid and pressured.         Behavior: Behavior is cooperative.         Judgment: Judgment normal.      Comments: Elevated mood, rapid speech       Diagnostics Completed at this Visit:  No results found for any visits on 11/11/20.     ASSESSMENT/PLAN:                                                       Prolonged depressive reaction  Stress and adjustment reaction  Anxiety  Severe major depression (H)  Moderate episode of recurrent major depressive disorder (H)  Patient will stop citalopram and start sertraline.  - sertraline (ZOLOFT) 50 MG tablet  Dispense: 30 tablet; Refill: 0    Shortness of breath  Refill given. He must stop smoking.  - albuterol (PROAIR HFA/PROVENTIL HFA/VENTOLIN HFA) 108 (90 Base) MCG/ACT inhaler  Dispense: 1 Inhaler; Refill: 1    Persistent insomnia  Refill given, 30 day supply.  - traZODone (DESYREL) 50 MG tablet  Dispense: 30 tablet; Refill: 0    We discussed him taking a Vitamin D supplement of 2000 units per day. Discussed NO smoking whatsoever, he can find another stress reliever.    Patient verbalizes understanding and is agreeable to plan of care.    Return in about 4 weeks (around 12/9/2020) for Mental Health Follow Up.       ANTONIETA Llamas, AGNP-C  Internal Medicine  [facility]    11/16/2020 9:00 AM    Portions of this note were dictated using speech recognition software. The note has been proofread but errors in the text may have been overlooked. Please contact me if there are any concerns regarding the accuracy of the dictation.

## 2020-11-11 ENCOUNTER — OFFICE VISIT (OUTPATIENT)
Dept: INTERNAL MEDICINE | Facility: OTHER | Age: 55
End: 2020-11-11
Attending: NURSE PRACTITIONER
Payer: COMMERCIAL

## 2020-11-11 VITALS
HEART RATE: 102 BPM | RESPIRATION RATE: 16 BRPM | OXYGEN SATURATION: 97 % | WEIGHT: 240.08 LBS | SYSTOLIC BLOOD PRESSURE: 140 MMHG | BODY MASS INDEX: 33.48 KG/M2 | DIASTOLIC BLOOD PRESSURE: 110 MMHG | TEMPERATURE: 95.8 F

## 2020-11-11 DIAGNOSIS — F43.29 STRESS AND ADJUSTMENT REACTION: ICD-10-CM

## 2020-11-11 DIAGNOSIS — R06.02 SHORTNESS OF BREATH: ICD-10-CM

## 2020-11-11 DIAGNOSIS — F33.1 MODERATE EPISODE OF RECURRENT MAJOR DEPRESSIVE DISORDER (H): ICD-10-CM

## 2020-11-11 DIAGNOSIS — F41.9 ANXIETY: ICD-10-CM

## 2020-11-11 DIAGNOSIS — G47.00 PERSISTENT INSOMNIA: ICD-10-CM

## 2020-11-11 DIAGNOSIS — Z72.0 TOBACCO ABUSE: ICD-10-CM

## 2020-11-11 DIAGNOSIS — Z87.891 HISTORY OF SMOKING: ICD-10-CM

## 2020-11-11 DIAGNOSIS — F32.2 SEVERE MAJOR DEPRESSION (H): ICD-10-CM

## 2020-11-11 DIAGNOSIS — F32.9 PROLONGED DEPRESSIVE REACTION: Primary | ICD-10-CM

## 2020-11-11 PROCEDURE — G0463 HOSPITAL OUTPT CLINIC VISIT: HCPCS

## 2020-11-11 PROCEDURE — 99214 OFFICE O/P EST MOD 30 MIN: CPT | Performed by: NURSE PRACTITIONER

## 2020-11-11 PROCEDURE — 99406 BEHAV CHNG SMOKING 3-10 MIN: CPT | Performed by: NURSE PRACTITIONER

## 2020-11-11 PROCEDURE — G0463 HOSPITAL OUTPT CLINIC VISIT: HCPCS | Mod: 25

## 2020-11-11 RX ORDER — TRAZODONE HYDROCHLORIDE 50 MG/1
50 TABLET, FILM COATED ORAL AT BEDTIME
Qty: 30 TABLET | Refills: 0 | Status: SHIPPED | OUTPATIENT
Start: 2020-11-11 | End: 2020-12-10

## 2020-11-11 RX ORDER — CITALOPRAM HYDROBROMIDE 20 MG/1
TABLET ORAL
COMMUNITY
Start: 2020-04-13 | End: 2020-11-11

## 2020-11-11 RX ORDER — ALPRAZOLAM 0.25 MG
0.25 TABLET ORAL
Qty: 10 TABLET | Refills: 0 | Status: SHIPPED | OUTPATIENT
Start: 2020-11-11 | End: 2020-12-10

## 2020-11-11 RX ORDER — ALBUTEROL SULFATE 90 UG/1
1-2 AEROSOL, METERED RESPIRATORY (INHALATION) EVERY 4 HOURS PRN
Qty: 1 INHALER | Refills: 1 | Status: SHIPPED | OUTPATIENT
Start: 2020-11-11 | End: 2021-08-20

## 2020-11-11 ASSESSMENT — PATIENT HEALTH QUESTIONNAIRE - PHQ9: SUM OF ALL RESPONSES TO PHQ QUESTIONS 1-9: 16

## 2020-11-11 ASSESSMENT — PAIN SCALES - GENERAL: PAINLEVEL: NO PAIN (0)

## 2020-11-11 NOTE — PATIENT INSTRUCTIONS
Vitamin D supplement 2000 units per day.    Recheck within 30 days for effects of Zoloft.    Rx: Zoloft 50 mg daily.

## 2020-11-11 NOTE — NURSING NOTE
"Chief Complaint   Patient presents with     RECHECK       Initial BP (!) 140/110 (BP Location: Right arm, Patient Position: Right side, Cuff Size: Adult Large)   Pulse 102   Temp 95.8  F (35.4  C) (Tympanic)   Resp 16   Wt 108.9 kg (240 lb 1.3 oz)   SpO2 97%   BMI 33.48 kg/m   Estimated body mass index is 33.48 kg/m  as calculated from the following:    Height as of 8/11/20: 1.803 m (5' 11\").    Weight as of this encounter: 108.9 kg (240 lb 1.3 oz).  Medication Reconciliation: complete    Jesusita Casillas LPN    "

## 2020-11-16 ENCOUNTER — TELEPHONE (OUTPATIENT)
Dept: INTERNAL MEDICINE | Facility: OTHER | Age: 55
End: 2020-11-16

## 2020-11-16 ASSESSMENT — ENCOUNTER SYMPTOMS
ACTIVITY CHANGE: 1
FATIGUE: 1
SLEEP DISTURBANCE: 1
NERVOUS/ANXIOUS: 1
SHORTNESS OF BREATH: 1
HYPERACTIVE: 1
COUGH: 0
DECREASED CONCENTRATION: 1

## 2020-12-08 DIAGNOSIS — F32.2 SEVERE MAJOR DEPRESSION (H): ICD-10-CM

## 2020-12-08 DIAGNOSIS — F32.9 PROLONGED DEPRESSIVE REACTION: ICD-10-CM

## 2020-12-08 DIAGNOSIS — F41.9 ANXIETY: ICD-10-CM

## 2020-12-08 DIAGNOSIS — F43.29 STRESS AND ADJUSTMENT REACTION: ICD-10-CM

## 2020-12-08 DIAGNOSIS — F33.1 MODERATE EPISODE OF RECURRENT MAJOR DEPRESSIVE DISORDER (H): ICD-10-CM

## 2020-12-08 DIAGNOSIS — G47.00 PERSISTENT INSOMNIA: Primary | ICD-10-CM

## 2020-12-09 RX ORDER — TRAZODONE HYDROCHLORIDE 50 MG/1
TABLET, FILM COATED ORAL
Qty: 90 TABLET | Refills: 1 | OUTPATIENT
Start: 2020-12-09

## 2020-12-09 NOTE — PROGRESS NOTES
"Nursing Notes:   Aditi Gibbs LPN  12/10/2020  1:39 PM  Signed  Chief Complaint   Patient presents with     Recheck Medication     Depression med check     Patient presents for med recheck for mental health check; also states he has low back pain with a pain level of 5.  Tylenol gives some relief.    Initial /80 (BP Location: Right arm, Patient Position: Sitting, Cuff Size: Adult Regular)   Pulse 89   Temp 97.4  F (36.3  C) (Tympanic)   Resp 16   Wt 106.1 kg (234 lb)   SpO2 96%   BMI 32.64 kg/m   Estimated body mass index is 32.64 kg/m  as calculated from the following:    Height as of 8/11/20: 1.803 m (5' 11\").    Weight as of this encounter: 106.1 kg (234 lb).  Medication Reconciliation: eleonora Gibbs LPN         Depression Response    Patient completed the PHQ-9 assessment for depression and scored >9? No    Question 9 on the PHQ-9 was positive for suicidality? No    I personally notified the following: visit provider  -----------------------------------------------------------------------------------------------------  Nursing note reviewed with patient.  Accurracy and completeness verified.    Lazaro Man is a 55 year old year old male patient who presents to the clinic today for mental health follow up appointment and medication review. He was seen on 11/11/2020 for discussion regarding Gene Sight testing, in addition to follow up on previous issues addressed at his last visit. He reports he is not sleeping, feels he is isolating more which is increasing his depression. The COVID pandemic is negatively impacting his mental health. He reports episodes of waking up from a deep sleep being SOB which increases his anxiety. He reports he sometimes can go outside and the cool air helps him catch his breath. Other times he can get relief by using his inhaler.  He would like to try a new mental health med and was started on  Zoloft and this is an appropriate medication for him per his " Gene Sight testing.    Today he reports he has finally started to notice some results with new med. He has been taking one Xanax with sertraline in the morning and feels this is helping him. He reports he is sleeping better, about 4-5 hours per night now. He feels positive that this is a good combination for him.     SHANA Score:  SHANA-7 SCORE 6/7/2019 8/11/2020 12/10/2020   Total Score 14 18 13        PHQ-2 Score:     PHQ-2 ( 1999 Pfizer) 12/10/2020 11/11/2020   Q1: Little interest or pleasure in doing things 2 3   Q2: Feeling down, depressed or hopeless 2 3   PHQ-2 Score 4 6        Problem List/PMH: Reviewed in EMR, and made relevant updates today.  Medications: Reviewed in EMR, and made relevant updates today.  Allergies: Reviewed in EMR, and made relevant updates today.    Current Code Status:  No Order    REVIEW OF SYSTEMS:    Review of Systems   Psychiatric/Behavioral: Positive for sleep disturbance. Negative for self-injury and suicidal ideas. The patient is nervous/anxious.         Improved sleep, decreased anxiety   All other systems reviewed and are negative.      EXAM:     /80 (BP Location: Right arm, Patient Position: Sitting, Cuff Size: Adult Regular)   Pulse 89   Temp 97.4  F (36.3  C) (Tympanic)   Resp 16   Wt 106.1 kg (234 lb)   SpO2 96%   BMI 32.64 kg/m      Current Pain Score: Moderate Pain (5)     Physical Exam  Vitals signs and nursing note reviewed.   Constitutional:       Appearance: Normal appearance.   HENT:      Head: Normocephalic and atraumatic.   Eyes:      Extraocular Movements: Extraocular movements intact.      Conjunctiva/sclera: Conjunctivae normal.      Pupils: Pupils are equal, round, and reactive to light.   Cardiovascular:      Rate and Rhythm: Normal rate and regular rhythm.      Heart sounds: Normal heart sounds.   Pulmonary:      Effort: Pulmonary effort is normal.      Breath sounds: Normal breath sounds.   Abdominal:      General: Bowel sounds are normal.       Palpations: Abdomen is soft.   Musculoskeletal: Normal range of motion.   Skin:     General: Skin is warm and dry.   Neurological:      Mental Status: He is alert and oriented to person, place, and time. Mental status is at baseline.   Psychiatric:         Mood and Affect: Mood normal.         Behavior: Behavior normal.         Thought Content: Thought content normal.         Judgment: Judgment normal.          DIAGNOSTICS AT THIS VISIT:  No results found for any visits on 12/10/20.     ASSESSMENT AND PLAN:    Prolonged depressive reaction  Stress and adjustment reaction  Severe major depression (H)  Anxiety  Moderate episode of recurrent major depressive disorder (H)  - ALPRAZolam (XANAX) 0.25 MG tablet  Dispense: 30 tablet; Refill: 0  - sertraline (ZOLOFT) 50 MG tablet  Dispense: 90 tablet; Refill: 3  - traZODone (DESYREL) 50 MG tablet  Dispense: 90 tablet; Refill: 3  - Improvement noted on sertraline and Xanax.   - Medication changes: None  - Patient will follow up in 30 days to review medication efficacy, depression and/or anxiety changes - sooner if he experiences any ADR's or increased anxiety and/or depression.  - Patient will call if he develops any ADR's including withdrawal syndrome, GI symptoms, or sexual dysfunction.    Shortness of breath  - ALPRAZolam (XANAX) 0.25 MG tablet  Dispense: 30 tablet; Refill: 0    Tobacco abuse  - nicotine (NICODERM CQ) 14 MG/24HR 24 hr patch  Dispense: 30 patch; Refill: 3    Persistent insomnia  - traZODone (DESYREL) 50 MG tablet  Dispense: 90 tablet; Refill: 3     Patient verbalizes understanding and is agreeable to plan of care.    Return for Mental Health Follow Up, Controlled Substance Agreement Med Mgmt Appt, Telephone Visit. as patient does not drive and has to walk to all appointments.       ANTONIETA Llamas, AGNP-C  Internal Medicine  [facility]    12/10/2020 3:45 PM

## 2020-12-09 NOTE — TELEPHONE ENCOUNTER
[facility] sent Rx request for the following:      Requested Prescriptions   Pending Prescriptions Disp Refills   sertraline (ZOLOFT) 50 MG tablet [Pharmacy Med Name: SERTRALINE 50MG TABLETS] 30 tablet 0    Sig: TAKE 1 TABLET(50 MG) BY MOUTH DAILY   Last Prescription Date:   11/11/20  Last Fill Qty/Refills:         30, R-0    Routing refill request to provider for review/approval because:   SSRIs Protocol Failed - 12/9/2020  9:58 AM       Failed - PHQ-9 score less than 5 in past 6 months      traZODone (DESYREL) 50 MG tablet [Pharmacy Med Name: TRAZODONE 50MG TABLETS] 30 tablet 0    Sig: TAKE 1 TABLET(50 MG) BY MOUTH AT BEDTIME   Last Prescription Date:   11/11/20  Last Fill Qty/Refills:         30, R-0      Last Office Visit:              11/11/20  Future Office visit:             Next 5 appointments (look out 90 days)    Dec 10, 2020  1:20 PM  Office Visit with Alyse Mcpherson NP  [facility] ([facility]) [address]  [place] [address]  [phone]        Routing to Alyse Mcpherson, to address during tomorrow's appointment.     Unable to complete prescription refill per RN Medication Refill Policy. Barbara De Jesus RN .............. 12/9/2020  10:02 AM

## 2020-12-10 ENCOUNTER — OFFICE VISIT (OUTPATIENT)
Dept: INTERNAL MEDICINE | Facility: OTHER | Age: 55
End: 2020-12-10
Attending: NURSE PRACTITIONER
Payer: COMMERCIAL

## 2020-12-10 VITALS
RESPIRATION RATE: 16 BRPM | HEART RATE: 89 BPM | SYSTOLIC BLOOD PRESSURE: 134 MMHG | TEMPERATURE: 97.4 F | DIASTOLIC BLOOD PRESSURE: 80 MMHG | OXYGEN SATURATION: 96 % | BODY MASS INDEX: 32.64 KG/M2 | WEIGHT: 234 LBS

## 2020-12-10 DIAGNOSIS — F32.9 PROLONGED DEPRESSIVE REACTION: Primary | ICD-10-CM

## 2020-12-10 DIAGNOSIS — G47.00 PERSISTENT INSOMNIA: ICD-10-CM

## 2020-12-10 DIAGNOSIS — F43.29 STRESS AND ADJUSTMENT REACTION: ICD-10-CM

## 2020-12-10 DIAGNOSIS — R06.02 SHORTNESS OF BREATH: ICD-10-CM

## 2020-12-10 DIAGNOSIS — Z72.0 TOBACCO ABUSE: ICD-10-CM

## 2020-12-10 DIAGNOSIS — F41.9 ANXIETY: ICD-10-CM

## 2020-12-10 DIAGNOSIS — F33.1 MODERATE EPISODE OF RECURRENT MAJOR DEPRESSIVE DISORDER (H): ICD-10-CM

## 2020-12-10 DIAGNOSIS — F32.2 SEVERE MAJOR DEPRESSION (H): ICD-10-CM

## 2020-12-10 PROCEDURE — 99214 OFFICE O/P EST MOD 30 MIN: CPT | Performed by: NURSE PRACTITIONER

## 2020-12-10 PROCEDURE — G0463 HOSPITAL OUTPT CLINIC VISIT: HCPCS

## 2020-12-10 RX ORDER — TRAZODONE HYDROCHLORIDE 50 MG/1
50 TABLET, FILM COATED ORAL AT BEDTIME
Qty: 90 TABLET | Refills: 3 | Status: SHIPPED | OUTPATIENT
Start: 2020-12-10 | End: 2021-08-13

## 2020-12-10 RX ORDER — ALPRAZOLAM 0.25 MG
0.25 TABLET ORAL EVERY MORNING
Qty: 30 TABLET | Refills: 0 | Status: SHIPPED | OUTPATIENT
Start: 2020-12-10 | End: 2021-01-11

## 2020-12-10 RX ORDER — TRAZODONE HYDROCHLORIDE 50 MG/1
50 TABLET, FILM COATED ORAL AT BEDTIME
Qty: 90 TABLET | Refills: 3 | Status: SHIPPED | OUTPATIENT
Start: 2020-12-10 | End: 2020-12-10

## 2020-12-10 RX ORDER — NICOTINE 21 MG/24HR
1 PATCH, TRANSDERMAL 24 HOURS TRANSDERMAL EVERY 24 HOURS
Qty: 30 PATCH | Refills: 3 | Status: SHIPPED | OUTPATIENT
Start: 2020-12-10 | End: 2023-01-18

## 2020-12-10 ASSESSMENT — ANXIETY QUESTIONNAIRES
1. FEELING NERVOUS, ANXIOUS, OR ON EDGE: MORE THAN HALF THE DAYS
5. BEING SO RESTLESS THAT IT IS HARD TO SIT STILL: SEVERAL DAYS
3. WORRYING TOO MUCH ABOUT DIFFERENT THINGS: MORE THAN HALF THE DAYS
IF YOU CHECKED OFF ANY PROBLEMS ON THIS QUESTIONNAIRE, HOW DIFFICULT HAVE THESE PROBLEMS MADE IT FOR YOU TO DO YOUR WORK, TAKE CARE OF THINGS AT HOME, OR GET ALONG WITH OTHER PEOPLE: VERY DIFFICULT
2. NOT BEING ABLE TO STOP OR CONTROL WORRYING: MORE THAN HALF THE DAYS
6. BECOMING EASILY ANNOYED OR IRRITABLE: MORE THAN HALF THE DAYS
GAD7 TOTAL SCORE: 13
7. FEELING AFRAID AS IF SOMETHING AWFUL MIGHT HAPPEN: MORE THAN HALF THE DAYS

## 2020-12-10 ASSESSMENT — ENCOUNTER SYMPTOMS
NERVOUS/ANXIOUS: 1
SLEEP DISTURBANCE: 1

## 2020-12-10 ASSESSMENT — PATIENT HEALTH QUESTIONNAIRE - PHQ9
5. POOR APPETITE OR OVEREATING: MORE THAN HALF THE DAYS
SUM OF ALL RESPONSES TO PHQ QUESTIONS 1-9: 11

## 2020-12-10 ASSESSMENT — PAIN SCALES - GENERAL: PAINLEVEL: MODERATE PAIN (5)

## 2020-12-10 NOTE — NURSING NOTE
"Chief Complaint   Patient presents with     Recheck Medication     Depression med check     Patient presents for med recheck for mental health check; also states he has low back pain with a pain level of 5.  Tylenol gives some relief.    Initial /80 (BP Location: Right arm, Patient Position: Sitting, Cuff Size: Adult Regular)   Pulse 89   Temp 97.4  F (36.3  C) (Tympanic)   Resp 16   Wt 106.1 kg (234 lb)   SpO2 96%   BMI 32.64 kg/m   Estimated body mass index is 32.64 kg/m  as calculated from the following:    Height as of 8/11/20: 1.803 m (5' 11\").    Weight as of this encounter: 106.1 kg (234 lb).  Medication Reconciliation: complete    Aditi Gibbs LPN    "

## 2020-12-11 ASSESSMENT — ANXIETY QUESTIONNAIRES: GAD7 TOTAL SCORE: 13

## 2021-01-08 NOTE — PROGRESS NOTES
"Gilson Man is a 55 year old male who is being evaluated via a billable telephone visit.      The patient has been notified of following:    \"This telephone visit will be conducted via a call between you and your physician/provider. We have found that certain health care needs can be provided without the need for a physical exam.  This service lets us provide the care you need with a short phone conversation.  If a prescription is necessary we can send it directly to your pharmacy.  If lab work is needed we can place an order for that and you can then stop by our lab to have the test done at a later time. Telephone visits are billed at different rates depending on your insurance coverage. During this emergency period, for some insurers they may be billed the same as an in-person visit.  Please reach out to your insurance provider with any questions. If during the course of the call the physician/provider feels a telephone visit is not appropriate, you will not be charged for this service.\"    Patient has given verbal consent for Telephone visit?  Yes    How would you like to obtain your AVS? Mail a copy    Lazaro Man is being assessed via telephone visit with the following concerns:  Chief Complaint   Patient presents with     RECHECK     prolonged depression      No Known Allergies    Reviewed and updated as needed this visit by Provider.    Gilson Willis is a 55 year old who presents to clinic today for the following health issues:    HPI     Review of medications for his depression, anxiety, any potential side effects and evaluation of therapy. He was started on sertraline 50 mg in addition to alprazolam 0.25 mg daily. He also takes trazodone to help him sleep.    Review of Systems   CONSTITUTIONAL: NEGATIVE for fever, chills, change in weight  ENT/MOUTH: NEGATIVE for ear, mouth and throat problems  RESP: NEGATIVE for significant cough or SOB  CV: NEGATIVE for chest pain, " palpitations or peripheral edema      Objective       Vitals:  No vitals were obtained today due to virtual visit.    Physical Exam   healthy, alert and no distress  PSYCH: Alert and oriented times 3; coherent speech, normal   rate and volume, able to articulate logical thoughts, able   to abstract reason, no tangential thoughts, no hallucinations   or delusions  His affect is normal and pleasant  RESP: No cough, no audible wheezing, able to talk in full sentences  Remainder of exam unable to be completed due to telephone visits    ASSESSMENT/PLAN:  Prolonged depressive reaction  Severe major depression (H)  Anxiety  Stress and adjustment reaction  Moderate episode of recurrent major depressive disorder (H)  Sertraline dose will be increased to 100 mg daily. Continue to take alprazolam, refill given.  Patient instructed to notify clinic of any signs or symptoms of adverse effects.  Call if depression is worsening.  - ALPRAZolam (XANAX) 0.25 MG tablet  Dispense: 90 tablet; Refill: 0  - sertraline (ZOLOFT) 100 MG tablet  Dispense: 30 tablet; Refill: 0    Shortness of breath  Related to anxiety. Refill given.  - ALPRAZolam (XANAX) 0.25 MG tablet  Dispense: 90 tablet; Refill: 0    Return in about 4 weeks (around 2/8/2021) for Controlled Substance Agreement Med Managed Systems Appt, Telephone Visit.  Appointment scheduled.    Patient verbalizes understanding and is agreeable to plan of care.    ANTONIETA Llamas, AGNP-C  Internal Medicine  01/11/2021 2:07 PM        Phone call duration: 6 minutes

## 2021-01-11 ENCOUNTER — VIRTUAL VISIT (OUTPATIENT)
Dept: INTERNAL MEDICINE | Facility: OTHER | Age: 56
End: 2021-01-11
Attending: NURSE PRACTITIONER
Payer: COMMERCIAL

## 2021-01-11 DIAGNOSIS — F33.1 MODERATE EPISODE OF RECURRENT MAJOR DEPRESSIVE DISORDER (H): ICD-10-CM

## 2021-01-11 DIAGNOSIS — R06.02 SHORTNESS OF BREATH: ICD-10-CM

## 2021-01-11 DIAGNOSIS — F32.9 PROLONGED DEPRESSIVE REACTION: Primary | ICD-10-CM

## 2021-01-11 DIAGNOSIS — F41.9 ANXIETY: ICD-10-CM

## 2021-01-11 DIAGNOSIS — F43.29 STRESS AND ADJUSTMENT REACTION: ICD-10-CM

## 2021-01-11 DIAGNOSIS — F32.2 SEVERE MAJOR DEPRESSION (H): ICD-10-CM

## 2021-01-11 PROCEDURE — 99441 PR PHYSICIAN TELEPHONE EVALUATION 5-10 MIN: CPT | Performed by: NURSE PRACTITIONER

## 2021-01-11 RX ORDER — SERTRALINE HYDROCHLORIDE 100 MG/1
100 TABLET, FILM COATED ORAL DAILY
Qty: 30 TABLET | Refills: 0 | Status: SHIPPED | OUTPATIENT
Start: 2021-01-11 | End: 2021-02-08

## 2021-01-11 RX ORDER — ALPRAZOLAM 0.25 MG
0.25 TABLET ORAL EVERY MORNING
Qty: 90 TABLET | Refills: 0 | Status: SHIPPED | OUTPATIENT
Start: 2021-01-11 | End: 2021-02-08

## 2021-01-11 ASSESSMENT — ANXIETY QUESTIONNAIRES
5. BEING SO RESTLESS THAT IT IS HARD TO SIT STILL: MORE THAN HALF THE DAYS
6. BECOMING EASILY ANNOYED OR IRRITABLE: NEARLY EVERY DAY
IF YOU CHECKED OFF ANY PROBLEMS ON THIS QUESTIONNAIRE, HOW DIFFICULT HAVE THESE PROBLEMS MADE IT FOR YOU TO DO YOUR WORK, TAKE CARE OF THINGS AT HOME, OR GET ALONG WITH OTHER PEOPLE: VERY DIFFICULT
2. NOT BEING ABLE TO STOP OR CONTROL WORRYING: NEARLY EVERY DAY
7. FEELING AFRAID AS IF SOMETHING AWFUL MIGHT HAPPEN: NEARLY EVERY DAY
1. FEELING NERVOUS, ANXIOUS, OR ON EDGE: SEVERAL DAYS
3. WORRYING TOO MUCH ABOUT DIFFERENT THINGS: NEARLY EVERY DAY
GAD7 TOTAL SCORE: 18

## 2021-01-11 ASSESSMENT — PATIENT HEALTH QUESTIONNAIRE - PHQ9
5. POOR APPETITE OR OVEREATING: NEARLY EVERY DAY
SUM OF ALL RESPONSES TO PHQ QUESTIONS 1-9: 11

## 2021-01-11 NOTE — NURSING NOTE
Chief Complaint   Patient presents with     RECHECK     prolonged depression   Patient presents for a telephone visit for a recheck on prolonged depression.    Medication Reconciliation: completed   Raquel Eaton LPN  1/11/2021 1:27 PM

## 2021-01-12 ASSESSMENT — ANXIETY QUESTIONNAIRES: GAD7 TOTAL SCORE: 18

## 2021-02-07 NOTE — PROGRESS NOTES
"Subjective     Lazaro Man is a 55 year old male who is being evaluated via a billable telephone visit.      The patient has been notified of following:    \"This telephone visit will be conducted via a call between you and your physician/provider. We have found that certain health care needs can be provided without the need for a physical exam.  This service lets us provide the care you need with a short phone conversation.  If a prescription is necessary we can send it directly to your pharmacy.  If lab work is needed we can place an order for that and you can then stop by our lab to have the test done at a later time. Telephone visits are billed at different rates depending on your insurance coverage. During this emergency period, for some insurers they may be billed the same as an in-person visit.  Please reach out to your insurance provider with any questions. If during the course of the call the physician/provider feels a telephone visit is not appropriate, you will not be charged for this service.\"    Patient has given verbal consent for Telephone visit?  Yes    How would you like to obtain your AVS? Mail a copy    Lazaro Man is being assessed via telephone visit with the following concerns:  Chief Complaint   Patient presents with     RECHECK     mental health         No Known Allergies      Patient Active Problem List   Diagnosis     Prolonged depressive reaction     Diverticulosis of sigmoid colon     Family history of malignant neoplasm of gastrointestinal tract     H/O adenomatous polyp of colon     History of peptic ulcer disease     Benign essential hypertension     Anxiety     Stress and adjustment reaction     Jennings's esophagus without dysplasia     Encounter for annual physical exam     Screening for condition     Severe major depression (H)     Past Surgical History:   Procedure Laterality Date     COLONOSCOPY  04/04/2016 4/4/16,F/U 2021  5 years     COLONOSCOPY N/A 7/29/2019    Failed " poor prep     COLONOSCOPY N/A 8/26/2019    F/U 2022 Advanced serrated adenoma     ESOPHAGOSCOPY, GASTROSCOPY, DUODENOSCOPY (EGD), COMBINED N/A 7/29/2019    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     OTHER SURGICAL HISTORY      07/11/09,07849.0,GA REPAIR PERF DUOD RUBY ULC/WND/INJURY,Repair of perforated posterior lesser curve gastric ulcer     OTHER SURGICAL HISTORY      01/31/2012,,HERNIA REPAIR,with mesh underlay       Social History     Tobacco Use     Smoking status: Light Tobacco Smoker     Packs/day: 0.25     Types: Cigarettes     Smokeless tobacco: Never Used   Substance Use Topics     Alcohol use: Not Currently     Alcohol/week: 12.0 standard drinks     Comment: Alcoholic Drinks/day: rare     Family History   Problem Relation Age of Onset     Colon Cancer Mother         Cancer-colon     Colon Cancer Other         Cancer-colon         Current Outpatient Medications   Medication Sig Dispense Refill     albuterol (PROAIR HFA/PROVENTIL HFA/VENTOLIN HFA) 108 (90 Base) MCG/ACT inhaler Inhale 1-2 puffs into the lungs every 4 hours as needed for shortness of breath / dyspnea or wheezing 1 Inhaler 1     ALPRAZolam (XANAX) 0.25 MG tablet Take 1 tablet (0.25 mg) by mouth every morning 90 tablet 0     carbamide peroxide (DEBROX) 6.5 % otic solution Place 5 drops into both ears daily as needed for other (impacted cerumen) (Patient not taking: Reported on 1/11/2021) 15 mL 3     lisinopril-hydrochlorothiazide (PRINZIDE/ZESTORETIC) 10-12.5 MG per tablet Take 1 tablet by mouth daily (Patient not taking: Reported on 8/11/2020) 30 tablet 11     nicotine (NICODERM CQ) 14 MG/24HR 24 hr patch Place 1 patch onto the skin every 24 hours 30 patch 3     omeprazole (PRILOSEC OTC) 20 MG EC tablet Take 2 tablets (40 mg) by mouth daily 60 tablet 11     sertraline (ZOLOFT) 100 MG tablet Take 1 tablet (100 mg) by mouth daily 30 tablet 0     sertraline (ZOLOFT) 50 MG tablet Take 1 tablet (50 mg) by mouth daily 90 tablet 3     traZODone  (DESYREL) 50 MG tablet Take 1 tablet (50 mg) by mouth At Bedtime 90 tablet 3     No Known Allergies  BP Readings from Last 3 Encounters:   12/10/20 134/80   11/11/20 (!) 140/110   08/11/20 138/82    Wt Readings from Last 3 Encounters:   12/10/20 106.1 kg (234 lb)   11/11/20 108.9 kg (240 lb 1.3 oz)   08/11/20 107.6 kg (237 lb 3.2 oz)         Reviewed and updated as needed this visit by Provider.    HPI:   At prior visit, he review of medications for his depression, anxiety, any potential side effects and evaluation of therapy. He was started on sertraline 50 mg in addition to alprazolam 0.25 mg daily. He also takes trazodone to help him sleep.  At last visit, sertraline dose was increased to 100 mg daily which patient feels is helping. He continues to use the alprazolam twice daily for anxiety.    Patient also reports issues regarding his chronic diarrhea.  He reports that he has had bowel issues since his surgery in 2009.  He reports he has cut back on his intake of red meat, has made some dietary changes which have helped somewhat manage his diarrhea.  He does report symptoms are worse in the morning, stating he has immediate diarrhea upon wakening.  He reports he has 30 seconds to a minute of warning otherwise he is incontinent of stool.  This has affected his daily quality of life as he has been unable to be employed due to this chronic diarrhea.  This is all contributed also to his mental health and we are working really hard to get that back on track.    He reports a recent episode where he was walking to the store in the below 0 temperatures in the cold air caused him to have an issue breathing which he reports was very scary which increased his anxiety. He has been using his inhaler, feeling like he was suffocating. He now reports he has slight irritation in his ribs and throat from coughing.    ROS:  Denies any fever, chills, chest pain, lower extremity edema, changes in bowel or bladder.    Objective    Reported vitals:  LMP  (LMP Unknown)    Psych: Alert and oriented times 3; coherent speech, normal rate and volume, able to articulate logical thoughts, able to abstract reason, no tangential thoughts, no hallucinations or delusions  Affect is appropriate.  Unable to complete examination due to telephone visit.    Assessment/Plan:  Stress and adjustment reaction  Severe major depression (H)  Prolonged depressive reaction  Anxiety  Patient continues to work on managing his stress and adjustment reactions doing well on Zoloft 100 mg and Xanax 0.25 mg twice daily. These will be refilled today. He is interested in talking with Dr. Thomas here at the clinic and a referral will be placed for that.  - sertraline (ZOLOFT) 100 MG tablet  Dispense: 90 tablet; Refill: 0  - ALPRAZolam (XANAX) 0.25 MG tablet  Dispense: 90 tablet; Refill: 0  - MENTAL HEALTH REFERRAL  - Adult; Outpatient Treatment; Individual/Couples/Family/Group Therapy/Health Psychology; Other: [facility] [phone]; We will contact you to schedule the appointment or please call with any questions    Diverticulosis of sigmoid colon  His uncontrollable diarrhea continues to be a problem for him and functioning at a job or being out in public. We will try to bulk up his stool to see if that offers him some relief and control.  - bulk laxative (BENEFIBER DRINK MIX) packet  Dispense: 28 packet; Refill: 0  - GASTROENTEROLOGY ADULT REF PROCEDURE ONLY    History of peptic ulcer disease  - bulk laxative (BENEFIBER DRINK MIX) packet  Dispense: 28 packet; Refill: 0  - GASTROENTEROLOGY ADULT REF PROCEDURE ONLY    H/O adenomatous polyp of colon  He is overdue for colonoscopy, strongly encouraged to have this done per recommendations.  - bulk laxative (BENEFIBER DRINK MIX) packet  Dispense: 28 packet; Refill: 0  - GASTROENTEROLOGY ADULT REF PROCEDURE ONLY    Chronic diarrhea  His uncontrollable diarrhea continues to be a problem for him and functioning at a  job or being out in public. We will try to bulk up his stool to see if that offers him some relief and control. He does wish to return to the workforce at some point if he is able to manage his issues with his chronic and uncontrollable diarrhea. This is causing him undue stress and anxiety as he has no warning when he is going to have a bout of diarrhea. Reassured that we will work towards a goal of normal bowel movements and the future goal of finding him employment. He does have concerns of whether he is going to be able to financially maintain his living arrangements if he gets cut off of disability until things are under control.  - bulk laxative (BENEFIBER DRINK MIX) packet  Dispense: 28 packet; Refill: 0  - GASTROENTEROLOGY ADULT REF PROCEDURE ONLY    Moderate episode of recurrent major depressive disorder (H)  - sertraline (ZOLOFT) 100 MG tablet  Dispense: 90 tablet; Refill: 0  - ALPRAZolam (XANAX) 0.25 MG tablet  Dispense: 90 tablet; Refill: 0    Shortness of breath  Advised that he can take an extra dose if he finds his anxiety is worsening throughout the day. He has been using the nicotine patch now for a week to try to stop smoking.  - ALPRAZolam (XANAX) 0.25 MG tablet  Dispense: 90 tablet; Refill: 0  - Greater than 3 minutes were spent on smoking cessation including encouragement to reduce cigarette use and discussion of modalities to assist with this  - Cessation was encouraged in order to improve pain, and long term outcomes  Patient will let us know if he needs additional cessation products.    Persistent insomnia  - MENTAL HEALTH REFERRAL  - Adult; Outpatient Treatment; Individual/Couples/Family/Group Therapy/Health Psychology; Other: [facility] [phone]; We will contact you to schedule the appointment or please call with any questions      Return in about 4 weeks (around 3/8/2021) for Controlled Substance Agreement Med Mgmt Appt, Telephone Visit through the winter.    Patient  verbalizes understanding and is agreeable to plan of care.    Phone call duration: 30 minutes    Alyse Mcpherson NP  02/10/2021 11:46 AM

## 2021-02-08 ENCOUNTER — VIRTUAL VISIT (OUTPATIENT)
Dept: INTERNAL MEDICINE | Facility: OTHER | Age: 56
End: 2021-02-08
Attending: NURSE PRACTITIONER
Payer: COMMERCIAL

## 2021-02-08 DIAGNOSIS — Z72.0 TOBACCO ABUSE: ICD-10-CM

## 2021-02-08 DIAGNOSIS — G47.00 PERSISTENT INSOMNIA: ICD-10-CM

## 2021-02-08 DIAGNOSIS — F43.29 STRESS AND ADJUSTMENT REACTION: Primary | ICD-10-CM

## 2021-02-08 DIAGNOSIS — F32.2 SEVERE MAJOR DEPRESSION (H): ICD-10-CM

## 2021-02-08 DIAGNOSIS — F32.9 PROLONGED DEPRESSIVE REACTION: ICD-10-CM

## 2021-02-08 DIAGNOSIS — K57.30 DIVERTICULOSIS OF SIGMOID COLON: ICD-10-CM

## 2021-02-08 DIAGNOSIS — R06.02 SHORTNESS OF BREATH: ICD-10-CM

## 2021-02-08 DIAGNOSIS — F33.1 MODERATE EPISODE OF RECURRENT MAJOR DEPRESSIVE DISORDER (H): ICD-10-CM

## 2021-02-08 DIAGNOSIS — Z86.0101 H/O ADENOMATOUS POLYP OF COLON: ICD-10-CM

## 2021-02-08 DIAGNOSIS — K52.9 CHRONIC DIARRHEA: ICD-10-CM

## 2021-02-08 DIAGNOSIS — F41.9 ANXIETY: ICD-10-CM

## 2021-02-08 DIAGNOSIS — Z87.11 HISTORY OF PEPTIC ULCER DISEASE: ICD-10-CM

## 2021-02-08 PROCEDURE — 99443 PR PHYSICIAN TELEPHONE EVALUATION 21-30 MIN: CPT | Performed by: NURSE PRACTITIONER

## 2021-02-08 PROCEDURE — 99406 BEHAV CHNG SMOKING 3-10 MIN: CPT | Mod: TEL | Performed by: NURSE PRACTITIONER

## 2021-02-08 RX ORDER — ALPRAZOLAM 0.25 MG
0.25 TABLET ORAL EVERY MORNING
Qty: 90 TABLET | Refills: 0 | Status: SHIPPED | OUTPATIENT
Start: 2021-02-08 | End: 2022-06-29

## 2021-02-08 RX ORDER — WHEAT DEXTRIN/ASPARTAME 3 G/6 G
1 POWDER IN PACKET (EA) ORAL DAILY
Qty: 28 PACKET | Refills: 0 | Status: SHIPPED | OUTPATIENT
Start: 2021-02-08 | End: 2021-03-08

## 2021-02-08 RX ORDER — SERTRALINE HYDROCHLORIDE 100 MG/1
100 TABLET, FILM COATED ORAL DAILY
Qty: 90 TABLET | Refills: 0 | Status: SHIPPED | OUTPATIENT
Start: 2021-02-08 | End: 2021-11-19

## 2021-02-08 NOTE — NURSING NOTE
Patient presents for a telephone visit for a follow up for mental health    Medication Reconciliation: completed   Raquel Eaton LPN  2/8/2021 1:10 PM

## 2021-03-01 DIAGNOSIS — K52.9 CHRONIC DIARRHEA: ICD-10-CM

## 2021-03-01 DIAGNOSIS — R15.9 FECAL INCONTINENCE: Primary | ICD-10-CM

## 2021-03-19 ENCOUNTER — OFFICE VISIT (OUTPATIENT)
Dept: PSYCHIATRY | Facility: OTHER | Age: 56
End: 2021-03-19
Attending: NURSE PRACTITIONER
Payer: COMMERCIAL

## 2021-03-19 VITALS
RESPIRATION RATE: 18 BRPM | HEART RATE: 92 BPM | BODY MASS INDEX: 32.89 KG/M2 | DIASTOLIC BLOOD PRESSURE: 70 MMHG | TEMPERATURE: 96.5 F | OXYGEN SATURATION: 98 % | SYSTOLIC BLOOD PRESSURE: 130 MMHG | WEIGHT: 235.8 LBS

## 2021-03-19 DIAGNOSIS — F32.2 SEVERE MAJOR DEPRESSION (H): Primary | ICD-10-CM

## 2021-03-19 DIAGNOSIS — G47.00 INSOMNIA, UNSPECIFIED TYPE: ICD-10-CM

## 2021-03-19 PROCEDURE — 99205 OFFICE O/P NEW HI 60 MIN: CPT | Performed by: PSYCHIATRY & NEUROLOGY

## 2021-03-19 PROCEDURE — G0463 HOSPITAL OUTPT CLINIC VISIT: HCPCS

## 2021-03-19 RX ORDER — LOPERAMIDE HCL 2 MG
2 CAPSULE ORAL
COMMUNITY
Start: 2021-03-01 | End: 2021-04-01

## 2021-03-19 RX ORDER — TRAZODONE HYDROCHLORIDE 150 MG/1
150 TABLET ORAL AT BEDTIME
Qty: 30 TABLET | Refills: 4 | Status: SHIPPED | OUTPATIENT
Start: 2021-03-19 | End: 2021-11-19

## 2021-03-19 RX ORDER — BUPROPION HYDROCHLORIDE 150 MG/1
150 TABLET ORAL EVERY MORNING
Qty: 7 TABLET | Refills: 1 | Status: SHIPPED | OUTPATIENT
Start: 2021-03-19 | End: 2021-08-13

## 2021-03-19 RX ORDER — BUPROPION HYDROCHLORIDE 300 MG/1
300 TABLET ORAL EVERY MORNING
Qty: 30 TABLET | Refills: 4 | Status: SHIPPED | OUTPATIENT
Start: 2021-03-19 | End: 2022-06-29

## 2021-03-19 ASSESSMENT — ANXIETY QUESTIONNAIRES
IF YOU CHECKED OFF ANY PROBLEMS ON THIS QUESTIONNAIRE, HOW DIFFICULT HAVE THESE PROBLEMS MADE IT FOR YOU TO DO YOUR WORK, TAKE CARE OF THINGS AT HOME, OR GET ALONG WITH OTHER PEOPLE: VERY DIFFICULT
GAD7 TOTAL SCORE: 18
3. WORRYING TOO MUCH ABOUT DIFFERENT THINGS: MORE THAN HALF THE DAYS
1. FEELING NERVOUS, ANXIOUS, OR ON EDGE: NEARLY EVERY DAY
7. FEELING AFRAID AS IF SOMETHING AWFUL MIGHT HAPPEN: NEARLY EVERY DAY
2. NOT BEING ABLE TO STOP OR CONTROL WORRYING: MORE THAN HALF THE DAYS
6. BECOMING EASILY ANNOYED OR IRRITABLE: NEARLY EVERY DAY
5. BEING SO RESTLESS THAT IT IS HARD TO SIT STILL: MORE THAN HALF THE DAYS

## 2021-03-19 ASSESSMENT — PATIENT HEALTH QUESTIONNAIRE - PHQ9
5. POOR APPETITE OR OVEREATING: NEARLY EVERY DAY
SUM OF ALL RESPONSES TO PHQ QUESTIONS 1-9: 12

## 2021-03-19 ASSESSMENT — PAIN SCALES - GENERAL: PAINLEVEL: MILD PAIN (3)

## 2021-03-19 NOTE — PROGRESS NOTES
"Outpatient Psychiatric Diagnostic Evaluation    Name: Lazaro Man      : 1965   Date: 3/19/2021    Source of Referral:  Franca Perez DO    Identifying Data:  Is a 56-year-old man seen for psychiatric diagnostic assessment and treatment of depression    Chief Complaint:   Patient presents with:  Anxiety  Depression  Insomnia     HPI:  Patient says he has been dealing with a \"cloud of depression for many years\".  He says he has been \"on and off different antidepressants for years\".  He reports that this past year has been especially stressful for him due to the COVID-19 virus and its effects on his friends and family.  He reports increased problems dealing with his mother's illness that kept her hospitalized for approximately a month when she was not allowed any visitors.    On 2020 patient's PCP started him on Zoloft for his depression and anxiety.  This was increased to 100 mg on an  2021 visit.  Patient reports that he is starting to improve and says that the Zoloft has been helping him \"not get stuck in a rut\", but he describes himself as \"still depressed\".    Psychiatric Review of Symptoms:  Depressed mood, fear of the future, decreased sleep with frequent waking, occasional panic attacks, decreased energy that \"comes and goes\", decreased interest in usual enjoyable activities    Psychiatric History:  Patient reports that he has received treatment for depression since at least .  He has done individual psychotherapy in the past, but says he did not like it because he felt that going over past events just made him more depressed.  He has been on a number of antidepressants, including but not limited to the following: Citalopram, Wellbutrin, Paxil.  He says that Paxil made him feel \"awful\", but all the other antidepressants seem to help briefly but then stop being effective.  He has never made a suicide attempt or been psychiatrically hospitalized    Chemical Use " "History:    Patient reports that he had significant issues with alcohol from approximately 2008 through 2018.  He notes that he had frequent blackouts and at least one DUI.  He is not currently drinking or using drugs and says that this is also a condition of him remaining in his current housing.    Past Medical History:  Past Medical History:   Diagnosis Date     Chronic or unspecified gastric ulcer with perforation (H)     7/11/09     Closed displaced fracture of shaft of third metacarpal bone of left hand     11/29/2016     Closed fracture of proximal end of right humerus with routine healing 1/11/2018     Closed nondisplaced fracture of proximal phalanx of left little finger     12/13/2016     Fracture of upper end of right humerus with routine healing     1/11/2018     Gastroduodenitis 7/29/2019     Hypertension 6/20/2017      Current psychiatric medications:  Zoloft 100 mg a day  Trazodone 50 mg at bedtime  Xanax 0.25 mg up to twice a day (patient reports that he uses this every morning, but rarely at any other time)    Family History:    Patient reports that his father was \"violent\", but patient is not aware of any family history of psychiatric disorders      Social History:  Social History     Socioeconomic History     Marital status: Single     Spouse name: Not on file     Number of children: Not on file     Years of education: Not on file     Highest education level: Not on file   Occupational History     Not on file   Social Needs     Financial resource strain: Not on file     Food insecurity     Worry: Not on file     Inability: Not on file     Transportation needs     Medical: Not on file     Non-medical: Not on file   Tobacco Use     Smoking status: Light Tobacco Smoker     Packs/day: 0.25     Types: Cigarettes     Smokeless tobacco: Never Used   Substance and Sexual Activity     Alcohol use: Not Currently     Alcohol/week: 12.0 standard drinks     Comment: Alcoholic Drinks/day: rare     Drug use: " Never     Sexual activity: Yes     Partners: Female   Lifestyle     Physical activity     Days per week: Not on file     Minutes per session: Not on file     Stress: Not on file   Relationships     Social connections     Talks on phone: Not on file     Gets together: Not on file     Attends Tenriism service: Not on file     Active member of club or organization: Not on file     Attends meetings of clubs or organizations: Not on file     Relationship status: Not on file     Intimate partner violence     Fear of current or ex partner: Not on file     Emotionally abused: Not on file     Physically abused: Not on file     Forced sexual activity: Not on file   Other Topics Concern     Parent/sibling w/ CABG, MI or angioplasty before 65F 55M? Not Asked   Social History Narrative    Currently not working. Trying to get disability for GI issues.      Patient was  for 20 years and is currently .  He has three grown offspring who do not live with him.  He currently lives alone in a cabin he is renting from a friend of his mother's.  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  He reports having major gastrointestinal surgery approximately 15 years ago and has not been able to work since then.    Mental Status Exam:  This is an alert, cooperative, fully oriented man appearing stated age.  Speech was somewhat loud in volume but normal rate and rhythm.  His speech patterns tended to exhibit circumstantiality and tangentiality.  Memory and cognition are grossly intact.  Mood shows marked depression and moderate to marked anxiety at times.  Affect is full range without lability.  Patient denies suicidal, homicidal or paranoid ideation.  Thought processes are goal-directed without hallucinations or delusions.  Insight and  judgment are adequate    Diagnosis:  Major depression, recurrent, marked  Anxiety, unspecified  Insomnia    Impression/Assessment:  This patient has a long history of depression that has never been adequately treated to remission.  He could benefit from additional medication, and we discussed several alternatives.  In addition his trazodone dosing can be increased to therapeutic effectiveness.  At this time I am not recommending individual psychotherapy, as patient has had  difficulty with this in the past, but this may be something that would be beneficial in the future.  At this time I am also not recommending changing his Xanax dosing, although I have discussed with the patient the potential for tolerance and dependency, and the goal of only needing to take this no more than 1-2 times in a week.    Treatment Plan:  1. Add Wellbutrin  mg in the morning for 3 to 7 days and then increase to 300 mg a day for depression  2. Increase trazodone to  mg at night for insomnia  3. Continue Zoloft 100 mg a day for depression and anxiety  4. Continue Xanax 0.25 mg up to twice a day for anxiety  5. Follow-up with Dr. Mason in 3-4 weeks    Time spent on day of visit 72 minutes-6 minutes (8:39 AM through 8:45 AM) review of EMR prior to visit, 49 minutes (10:41 AM through 11:30 AM) face-to-face meeting with patient, including counseling on above issues, discussion of risk/benefits, possible alternatives and possible side effects to medications, prescription of medications in the EMR, and detailed verbal and written instructions on medication dosage adjustments, 17 minutes (1130 through 11:47 AM) documentation in the EMR      Signed: Jatin Mason MD on 3/19/2021 at 11:31 AM

## 2021-03-19 NOTE — PATIENT INSTRUCTIONS
wellbutrin XL !50, 300 mg:  Start with 150 mg in the am for 3-7 days,  Then increase to 300 mg every morning    Trazodone 150 mg:  Increase (dosage) to 75 mg (1/2 a 150 mg pill)  If not sleeping reasonably well through the night, increase by 25-50 mg a night until good sleep (maximum for now 200 mg)  If too tired the next am, take earlier in the evening and/or decrease dosage  (you may need to divide the total dosage into 2 times)

## 2021-03-19 NOTE — NURSING NOTE
"Chief Complaint   Patient presents with     Anxiety     Depression     Insomnia       Initial /70 (BP Location: Right arm, Patient Position: Sitting, Cuff Size: Adult Large)   Pulse 92   Temp 96.5  F (35.8  C) (Tympanic)   Resp 18   Wt 107 kg (235 lb 12.8 oz)   SpO2 98%   BMI 32.89 kg/m   Estimated body mass index is 32.89 kg/m  as calculated from the following:    Height as of 8/11/20: 1.803 m (5' 11\").    Weight as of this encounter: 107 kg (235 lb 12.8 oz).  Medication Reconciliation: complete    COLIN WU, TAMANNAN  "

## 2021-03-20 ASSESSMENT — ANXIETY QUESTIONNAIRES: GAD7 TOTAL SCORE: 18

## 2021-04-19 ENCOUNTER — OFFICE VISIT (OUTPATIENT)
Dept: PSYCHIATRY | Facility: OTHER | Age: 56
End: 2021-04-19
Attending: PSYCHIATRY & NEUROLOGY
Payer: COMMERCIAL

## 2021-04-19 VITALS
SYSTOLIC BLOOD PRESSURE: 142 MMHG | OXYGEN SATURATION: 98 % | DIASTOLIC BLOOD PRESSURE: 90 MMHG | BODY MASS INDEX: 32.27 KG/M2 | RESPIRATION RATE: 20 BRPM | TEMPERATURE: 97.2 F | WEIGHT: 231.4 LBS | HEART RATE: 96 BPM

## 2021-04-19 DIAGNOSIS — F32.2 SEVERE MAJOR DEPRESSION (H): ICD-10-CM

## 2021-04-19 DIAGNOSIS — F41.9 ANXIETY: Primary | ICD-10-CM

## 2021-04-19 PROCEDURE — G0463 HOSPITAL OUTPT CLINIC VISIT: HCPCS

## 2021-04-19 PROCEDURE — 99214 OFFICE O/P EST MOD 30 MIN: CPT | Performed by: PSYCHIATRY & NEUROLOGY

## 2021-04-19 RX ORDER — SERTRALINE HYDROCHLORIDE 100 MG/1
100 TABLET, FILM COATED ORAL DAILY
Qty: 90 TABLET | Refills: 1 | Status: SHIPPED | OUTPATIENT
Start: 2021-04-19 | End: 2021-08-13

## 2021-04-19 RX ORDER — ALPRAZOLAM 0.25 MG
0.25 TABLET ORAL DAILY
Qty: 90 TABLET | Refills: 1 | Status: SHIPPED | OUTPATIENT
Start: 2021-04-19 | End: 2021-08-13

## 2021-04-19 ASSESSMENT — ANXIETY QUESTIONNAIRES
6. BECOMING EASILY ANNOYED OR IRRITABLE: MORE THAN HALF THE DAYS
GAD7 TOTAL SCORE: 10
1. FEELING NERVOUS, ANXIOUS, OR ON EDGE: SEVERAL DAYS
IF YOU CHECKED OFF ANY PROBLEMS ON THIS QUESTIONNAIRE, HOW DIFFICULT HAVE THESE PROBLEMS MADE IT FOR YOU TO DO YOUR WORK, TAKE CARE OF THINGS AT HOME, OR GET ALONG WITH OTHER PEOPLE: VERY DIFFICULT
5. BEING SO RESTLESS THAT IT IS HARD TO SIT STILL: NOT AT ALL
2. NOT BEING ABLE TO STOP OR CONTROL WORRYING: MORE THAN HALF THE DAYS
7. FEELING AFRAID AS IF SOMETHING AWFUL MIGHT HAPPEN: SEVERAL DAYS
3. WORRYING TOO MUCH ABOUT DIFFERENT THINGS: MORE THAN HALF THE DAYS

## 2021-04-19 ASSESSMENT — PATIENT HEALTH QUESTIONNAIRE - PHQ9
SUM OF ALL RESPONSES TO PHQ QUESTIONS 1-9: 9
5. POOR APPETITE OR OVEREATING: MORE THAN HALF THE DAYS

## 2021-04-19 ASSESSMENT — PAIN SCALES - GENERAL: PAINLEVEL: MILD PAIN (3)

## 2021-04-19 NOTE — NURSING NOTE
"Chief Complaint   Patient presents with     Anxiety     Depression     Insomnia       Initial BP (!) 132/92 (BP Location: Right arm, Patient Position: Sitting, Cuff Size: Adult Large)   Pulse 96   Temp 97.2  F (36.2  C) (Tympanic)   Resp 20   Wt 105 kg (231 lb 6.4 oz)   SpO2 98%   BMI 32.27 kg/m   Estimated body mass index is 32.27 kg/m  as calculated from the following:    Height as of 8/11/20: 1.803 m (5' 11\").    Weight as of this encounter: 105 kg (231 lb 6.4 oz).  Medication Reconciliation: complete    COLIN WU, TAMANNAN  "

## 2021-04-19 NOTE — PROGRESS NOTES
Psychiatric Progress Note/Visit  April 19, 2021    Identifying Data:  This is a 56-year-old man seen for follow-up psychiatric medication management visit for treatment of depression, anxiety and insomnia    Interval History:  Patient was seen for psychiatric diagnostic assessment on March 19, 2021.  At that time he was experiencing significant depression with poor sleep and low energy and decreased interest.  We decided to start patient on Wellbutrin and increase his trazodone at bedtime.  Patient reports that he is tolerating the Wellbutrin at 300 mg a day and feels that it is slowly starting to work.  In addition he is sleeping better on 150 mg of trazodone at bedtime with no adverse effects.  He does report that he is often having very vivid dreams that will occasionally wake him up and cause some increased anxiety.  He does report that he is able to to fall back asleep within 15 minutes or so most nights if he wakes up during the night.  Patient reports that he continues to take 0.25 mg Xanax most mornings, but does not take it at any other time during the day.    Mental Status Exam:  Patient's depression is somewhat less, in the moderate range.  Anxiety is also decreased, in the mild to moderate range.  Thought processes are more goal-directed with minimal to no circumstantiality or tangentiality.  Remainder of MSE is essentially unchanged from March 19, 2021.    Current Psychiatric Medications:  Wellbutrin  mg a day  Zoloft 100 mg a day  Trazodone 150 mg at night  Xanax 0.25 mg as needed once a day    Lab Tests/Results:  No laboratory studies were ordered previously and none are being ordered today    Diagnosis:  Major depression, recurrent, marked  Anxiety, unspecified  Insomnia    Impression/Assessment:  Patient is having a good initial response to the addition of Wellbutrin to treat his depression.  In addition he is having a good response to increased dosage of trazodone for sleep maintenance.  He  reports ongoing experience of vivid and occasionally upsetting dreams and I have encouraged him to consider restarting individual psychotherapy and to bring these into his therapy sessions.  Patient appears more open to this and we did discuss strategies for him to get the most out of therapy.    Plan:  1.  Continue Wellbutrin  mg a day for depression  2.  Continue Zoloft 100 mg a day for depression and anxiety  3.  Continue trazodone 150 mg at bedtime for sleep maintenance  4.  Continue as needed Xanax 0.25 mg once a day for breakthrough anxiety  5.  Restart individual psychotherapy.  Patient was given a list of providers and clinics in the area to help him find an individual therapist  6.  Follow-up with Dr. Mason in 1 month    Total time spent on day of visit 31 minutes-5 minutes (8:29 AM-8:34 AM) review of EMR prior to visit, 15 minutes (11:27 AM through 11:52 AM) face-to-face meeting with patient, including counseling on above issues, prescription of medications in the EMR, and recommendations and guidelines for restarting individual psychotherapy, 11 minutes (11:52 AM through 12:03 PM) documentation in the EMR    Jatin Mason MD

## 2021-04-20 ASSESSMENT — ANXIETY QUESTIONNAIRES: GAD7 TOTAL SCORE: 10

## 2021-08-13 ENCOUNTER — OFFICE VISIT (OUTPATIENT)
Dept: PSYCHIATRY | Facility: OTHER | Age: 56
End: 2021-08-13
Attending: PSYCHIATRY & NEUROLOGY
Payer: COMMERCIAL

## 2021-08-13 VITALS
BODY MASS INDEX: 32.51 KG/M2 | SYSTOLIC BLOOD PRESSURE: 130 MMHG | HEIGHT: 71 IN | WEIGHT: 232.2 LBS | OXYGEN SATURATION: 97 % | RESPIRATION RATE: 18 BRPM | TEMPERATURE: 98.2 F | HEART RATE: 96 BPM | DIASTOLIC BLOOD PRESSURE: 80 MMHG

## 2021-08-13 DIAGNOSIS — F41.9 ANXIETY: ICD-10-CM

## 2021-08-13 DIAGNOSIS — F32.2 SEVERE MAJOR DEPRESSION (H): Primary | ICD-10-CM

## 2021-08-13 DIAGNOSIS — G47.00 INSOMNIA, UNSPECIFIED TYPE: ICD-10-CM

## 2021-08-13 DIAGNOSIS — F51.5 NIGHTMARES: ICD-10-CM

## 2021-08-13 PROCEDURE — G0463 HOSPITAL OUTPT CLINIC VISIT: HCPCS

## 2021-08-13 PROCEDURE — 99215 OFFICE O/P EST HI 40 MIN: CPT | Performed by: PSYCHIATRY & NEUROLOGY

## 2021-08-13 RX ORDER — PROPRANOLOL HYDROCHLORIDE 10 MG/1
10 TABLET ORAL 4 TIMES DAILY PRN
Qty: 120 TABLET | Refills: 4 | Status: SHIPPED | OUTPATIENT
Start: 2021-08-13 | End: 2021-11-19

## 2021-08-13 RX ORDER — BUPROPION HYDROCHLORIDE 300 MG/1
300 TABLET ORAL EVERY MORNING
Qty: 90 TABLET | Refills: 1 | Status: SHIPPED | OUTPATIENT
Start: 2021-08-13 | End: 2022-01-26

## 2021-08-13 RX ORDER — ZOLPIDEM TARTRATE 10 MG/1
10 TABLET ORAL
Qty: 30 TABLET | Refills: 5 | Status: SHIPPED | OUTPATIENT
Start: 2021-08-13 | End: 2022-01-26

## 2021-08-13 RX ORDER — PRAZOSIN HYDROCHLORIDE 2 MG/1
4 CAPSULE ORAL AT BEDTIME
Qty: 60 CAPSULE | Refills: 4 | Status: SHIPPED | OUTPATIENT
Start: 2021-08-13 | End: 2021-11-19

## 2021-08-13 ASSESSMENT — MIFFLIN-ST. JEOR: SCORE: 1895.38

## 2021-08-13 ASSESSMENT — ANXIETY QUESTIONNAIRES
IF YOU CHECKED OFF ANY PROBLEMS ON THIS QUESTIONNAIRE, HOW DIFFICULT HAVE THESE PROBLEMS MADE IT FOR YOU TO DO YOUR WORK, TAKE CARE OF THINGS AT HOME, OR GET ALONG WITH OTHER PEOPLE: VERY DIFFICULT
5. BEING SO RESTLESS THAT IT IS HARD TO SIT STILL: NOT AT ALL
6. BECOMING EASILY ANNOYED OR IRRITABLE: MORE THAN HALF THE DAYS
3. WORRYING TOO MUCH ABOUT DIFFERENT THINGS: MORE THAN HALF THE DAYS
2. NOT BEING ABLE TO STOP OR CONTROL WORRYING: MORE THAN HALF THE DAYS
7. FEELING AFRAID AS IF SOMETHING AWFUL MIGHT HAPPEN: MORE THAN HALF THE DAYS
1. FEELING NERVOUS, ANXIOUS, OR ON EDGE: SEVERAL DAYS
GAD7 TOTAL SCORE: 11

## 2021-08-13 ASSESSMENT — PAIN SCALES - GENERAL: PAINLEVEL: NO PAIN (0)

## 2021-08-13 ASSESSMENT — PATIENT HEALTH QUESTIONNAIRE - PHQ9
5. POOR APPETITE OR OVEREATING: MORE THAN HALF THE DAYS
SUM OF ALL RESPONSES TO PHQ QUESTIONS 1-9: 8

## 2021-08-13 NOTE — PROGRESS NOTES
"Psychiatric Progress Note/Visit  8/13/2021    Identifying Data:  This is a 56-year-old man seen for follow-up psychiatric medication management visit for treatment of depression, anxiety and insomnia    Interval History:  Patient was most recently seen on 4/19/2021.  At that time he was doing better on Wellbutrin  mg a day and trazodone 150 mg at night.  He did report having a lot of vivid dreams but he says it was not causing problems at the time.  We did not make any medication changes but patient was encouraged to restart individual psychotherapy  Today patient reports that he thinks he is doing better on the combination of antidepressants, but does complain of feeling sedated the next morning after taking his trazodone at bedtime.  He also reports that he has been having \"terrible dreams\" that wake him up at night.  He says that some of these are reoccurring and they often have a theme of him trying to do \"impossible tasks\".    Mental Status Exam:  Depression remains moderate.  Anxiety remains mild to moderate.  Patient's thought processes are goal-directed with no obvious circumstantiality or tangentiality.  Remainder of MSE is essentially unchanged from 4/19/2021.    Current Psychiatric Medications:  Wellbutrin  mg a day  Zoloft 100 mg a day  Trazodone 150 mg at bedtime-to be tapered off of after this visit  Xanax 0.25 mg as needed    Lab Tests/Results:  No laboratory studies were ordered previously and none are being ordered today    Diagnosis:  Major depression, recurrent, marked  Anxiety, unspecified  Insomnia    Impression/Assessment:  Patient has apparently had some slight regression with increased dreaming and increased waking during the night.  We also discussed longstanding problems with anxiety, especially social anxiety and the need to get back into individual psychotherapy to help work on some of the longstanding problems.  We also discussed a trial of Ambien at night instead of " trazodone, as patient reports he has previously done well on this.  We also discussed a trial of prazosin at night to try to help decrease his dreaming and a trial of as needed propranolol to help with breakthrough anxiety.  We will also gradually increase his Zoloft to get better antianxiety and antidepressant effects.    Plan:  1.  Increase Zoloft to 125-150 mg a day for depression and anxiety  2.  Taper off of trazodone  3.  Trial of Ambien 5-10 mg at bedtime for insomnia  4.  Trial of prazosin 2-6 mg at night to decrease dreaming  5.  Trial of as needed propranolol, 10-40 mg, for breakthrough anxiety, especially social anxiety  6.  Continue as needed low-dose Xanax for breakthrough anxiety  7.  Start individual psychotherapy.  Patient has current list of possible providers and I have encouraged him to be sure to get a therapist who will help him work with his dreams.  8.  Follow-up with Dr. Mason in 1 month    Time spent on day of visit exceeded 62 minutes-8 minutes (9:20 AM through 9:28 AM) review of EMR prior to visit, 44 minutes (1:48 PM through 2:32 PM)  face-to-face meeting with patient, including discussion of risk/benefits, alternatives and possible side effects to medication, detailed verbal and written instructions on medication dosage adjustments, counseling on above issues, and prescription of medications in the EMR, 10 minutes (2:32 PM through 2:42 PM)  documentation in the EMR      Jatin Mason MD

## 2021-08-13 NOTE — PATIENT INSTRUCTIONS
Trazodone 150 mg:  Decrease by 1/3 tablet once a week until done    Zoloft (sertraline) 100 mg, 50 mg:  Increase to 125 mg (100 mg +1/2 a 50 mg tablet) for 2 weeks  If anxiety is not much less and there is not much less waking through the night,  Increase to 150 mg daily    Ambien (zolpidem) 10 mg:  A sleeping pill,  Take in bed or on the way to bed and try to stay in bed as much as possible for the next 7-8 hours  Start with 1/2 pill for 1-2 nights, but will probably need 1 pill for full effectiveness    Prazosin 2 mg:  Take at bedtime to help decrease dreaming,  Start with 1 pill for 4-7 days, but increase to 2 pills at bedtime if needed,  After at least 2 more weeks, you could further increase to 3 pills at bedtime if necessary  If you get out of bed during the night, be sure you are not lightheaded or dizzy before starting walking    Propranolol 10 mg:  Take as needed to decrease anxiety, especially social anxiety or performance type anxiety  Start to work in about 30-45 minutes,  Lasts about 3-4 hours  You may need to take 2 at a time, possibly up to 3-4 at a time for effectiveness  Higher dosages may cause lightheadedness or dizziness,  So always test out any new dosage at home before you use it in the community

## 2021-08-14 ASSESSMENT — ANXIETY QUESTIONNAIRES: GAD7 TOTAL SCORE: 11

## 2021-08-20 ENCOUNTER — OFFICE VISIT (OUTPATIENT)
Dept: INTERNAL MEDICINE | Facility: OTHER | Age: 56
End: 2021-08-20
Attending: NURSE PRACTITIONER
Payer: COMMERCIAL

## 2021-08-20 VITALS
WEIGHT: 231.4 LBS | HEART RATE: 99 BPM | TEMPERATURE: 98.1 F | RESPIRATION RATE: 18 BRPM | OXYGEN SATURATION: 97 % | SYSTOLIC BLOOD PRESSURE: 138 MMHG | BODY MASS INDEX: 32.27 KG/M2 | DIASTOLIC BLOOD PRESSURE: 88 MMHG

## 2021-08-20 DIAGNOSIS — F32.9 PROLONGED DEPRESSIVE REACTION: ICD-10-CM

## 2021-08-20 DIAGNOSIS — F43.29 STRESS AND ADJUSTMENT REACTION: Primary | ICD-10-CM

## 2021-08-20 DIAGNOSIS — F32.2 SEVERE MAJOR DEPRESSION (H): ICD-10-CM

## 2021-08-20 DIAGNOSIS — F41.9 ANXIETY: ICD-10-CM

## 2021-08-20 DIAGNOSIS — R06.02 SHORTNESS OF BREATH: ICD-10-CM

## 2021-08-20 DIAGNOSIS — F33.1 MODERATE EPISODE OF RECURRENT MAJOR DEPRESSIVE DISORDER (H): ICD-10-CM

## 2021-08-20 PROCEDURE — G0463 HOSPITAL OUTPT CLINIC VISIT: HCPCS

## 2021-08-20 PROCEDURE — 99213 OFFICE O/P EST LOW 20 MIN: CPT | Performed by: NURSE PRACTITIONER

## 2021-08-20 RX ORDER — ALBUTEROL SULFATE 90 UG/1
1-2 AEROSOL, METERED RESPIRATORY (INHALATION) EVERY 4 HOURS PRN
Qty: 6.7 G | Refills: 11 | Status: SHIPPED | OUTPATIENT
Start: 2021-08-20 | End: 2022-08-26

## 2021-08-20 ASSESSMENT — ANXIETY QUESTIONNAIRES
1. FEELING NERVOUS, ANXIOUS, OR ON EDGE: SEVERAL DAYS
7. FEELING AFRAID AS IF SOMETHING AWFUL MIGHT HAPPEN: SEVERAL DAYS
5. BEING SO RESTLESS THAT IT IS HARD TO SIT STILL: NOT AT ALL
6. BECOMING EASILY ANNOYED OR IRRITABLE: SEVERAL DAYS
2. NOT BEING ABLE TO STOP OR CONTROL WORRYING: MORE THAN HALF THE DAYS
3. WORRYING TOO MUCH ABOUT DIFFERENT THINGS: MORE THAN HALF THE DAYS
GAD7 TOTAL SCORE: 9
IF YOU CHECKED OFF ANY PROBLEMS ON THIS QUESTIONNAIRE, HOW DIFFICULT HAVE THESE PROBLEMS MADE IT FOR YOU TO DO YOUR WORK, TAKE CARE OF THINGS AT HOME, OR GET ALONG WITH OTHER PEOPLE: VERY DIFFICULT

## 2021-08-20 ASSESSMENT — PAIN SCALES - GENERAL: PAINLEVEL: NO PAIN (0)

## 2021-08-20 ASSESSMENT — ENCOUNTER SYMPTOMS
SLEEP DISTURBANCE: 1
SHORTNESS OF BREATH: 1
NERVOUS/ANXIOUS: 1

## 2021-08-20 ASSESSMENT — PATIENT HEALTH QUESTIONNAIRE - PHQ9: 5. POOR APPETITE OR OVEREATING: MORE THAN HALF THE DAYS

## 2021-08-20 NOTE — PROGRESS NOTES
"Lazaro Man  : 1965 Age: 56 year old Sex: male MRN: [id number]    CC:   Chief Complaint   Patient presents with     Recheck Medication     Med Review       SHANA Score:    SHANA-7 SCORE 2021   Total Score 10 11 9       PHQ-2 Score:     PHQ-2 (  [employer]) 2021   Q1: Little interest or pleasure in doing things 1 0   Q2: Feeling down, depressed or hopeless 1 0   PHQ-2 Score 2 0            Tobacco Use      Smoking status: Light Tobacco Smoker        Packs/day: 0.25        Types: Cigarettes      Smokeless tobacco: Never Used      NURSE'S NOTES:    Nursing Notes:   Aditi Gibbs LPN  2021  1:54 PM  Signed  Chief Complaint   Patient presents with     Recheck Medication     Med Review       Initial BP (!) 144/94 (BP Location: Right arm, Patient Position: Sitting, Cuff Size: Adult Large)   Pulse 99   Temp 98.1  F (36.7  C) (Tympanic)   Resp 18   Wt 105 kg (231 lb 6.4 oz)   SpO2 97%   BMI 32.27 kg/m   Estimated body mass index is 32.27 kg/m  as calculated from the following:    Height as of 21: 1.803 m (5' 11\").    Weight as of this encounter: 105 kg (231 lb 6.4 oz).  Medication Reconciliation: complete  FOOD SECURITY SCREENING QUESTIONS  Hunger Vital Signs:  Within the past 12 months we worried whether our food would run out before we got money to buy more. Never  Within the past 12 months the food we bought just didn't last and we didn't have money to get more. Never    Advance care plan reviewed      Aditi Gibbs LPN         Nursing note reviewed with patient.  Accuracy and completeness verified.      SUBJECTIVE:                                                      HPI:   Lazaro Man presents to the clinic today for follow up. He has been seen in the past for mental health and most recently been following for medication management with Dr. Thomas.  He reports Martha has been making medication changes, most recently changing to minipres for his " night terrors. It was also recommended that he stop taking trazodone and be switched to Ambien 10 mg at HS. He has taken ambien in the past and tolerated well. He is feeling better, trying to socialize more. He misses his mom who moved to the [facility]. He used to spend every Sunday with her and he is trying to adjust to the concept she is gone.    He needs a refill of his albuterol inhaler. He has been using it multiple times per week for SOB relating to increased anxiety at night and due to the decreased air quality.    Lazaro Man also presents with:    Patient Active Problem List   Diagnosis     Prolonged depressive reaction     Diverticulosis of sigmoid colon     Family history of malignant neoplasm of gastrointestinal tract     H/O adenomatous polyp of colon     History of peptic ulcer disease     Benign essential hypertension     Anxiety     Stress and adjustment reaction     Jennings's esophagus without dysplasia     Encounter for annual physical exam     Screening for condition     Severe major depression (H)       Current Code Status:  No Order    REVIEW OF SYSTEMS:    Review of Systems   Respiratory: Positive for shortness of breath.    Psychiatric/Behavioral: Positive for sleep disturbance. The patient is nervous/anxious.    All other systems reviewed and are negative.      Problem List/PMH: Reviewed in EMR, and made relevant updates today.  Medications: Reviewed in EMR, and made relevant updates today.  Allergies: Reviewed in EMR, and made relevant updates today.    OBJECTIVE:                                                      BP (!) 140/92 (BP Location: Right arm, Patient Position: Sitting, Cuff Size: Adult Large)   Pulse 99   Temp 98.1  F (36.7  C) (Tympanic)   Resp 18   Wt 105 kg (231 lb 6.4 oz)   SpO2 97%   BMI 32.27 kg/m      Current Pain Score:   No Pain (0)     Physical Exam  Vitals and nursing note reviewed.   Constitutional:       Appearance: Normal appearance.   HENT:      Head:  Normocephalic and atraumatic.   Eyes:      Extraocular Movements: Extraocular movements intact.      Conjunctiva/sclera: Conjunctivae normal.   Cardiovascular:      Rate and Rhythm: Normal rate and regular rhythm.      Heart sounds: Normal heart sounds.   Pulmonary:      Effort: Pulmonary effort is normal.      Breath sounds: Normal breath sounds.   Abdominal:      General: Bowel sounds are normal.      Palpations: Abdomen is soft.   Musculoskeletal:         General: Normal range of motion.   Skin:     General: Skin is dry.   Neurological:      Mental Status: He is alert and oriented to person, place, and time. Mental status is at baseline.   Psychiatric:         Mood and Affect: Mood normal.         Behavior: Behavior normal.         Thought Content: Thought content normal.         Judgment: Judgment normal.        BP Readings from Last 3 Encounters:   08/20/21 (!) 140/92   08/13/21 130/80   04/19/21 (!) 142/90      Vitals:    08/20/21 1344   Weight: 105 kg (231 lb 6.4 oz)        The 10-year ASCVD risk score (Nanci VILLA Jr., et al., 2013) is: 11.5%    Values used to calculate the score:      Age: 56 years      Sex: Male      Is Non- : No      Diabetic: No      Tobacco smoker: Yes      Systolic Blood Pressure: 140 mmHg      Is BP treated: Yes      HDL Cholesterol: 56 mg/dL      Total Cholesterol: 150 mg/dL    Diagnostics Completed at this Visit:    No results found for any visits on 08/20/21.     ASSESSMENT AND PLAN:    Shortness of breath  Refill given.  - albuterol (PROAIR HFA/PROVENTIL HFA/VENTOLIN HFA) 108 (90 Base) MCG/ACT inhaler  Dispense: 6.7 g; Refill: 11    Stress and adjustment reaction  Severe major depression (H)  Prolonged depressive reaction  Anxiety  Moderate episode of recurrent major depressive disorder (H)  Patient wishes to go to therapy at [facility] as they are opening a new office which will be closer to his apartment, making it more accessible. Referral  sent. ABHISHEK signed in clinic today.  - MENTAL HEALTH REFERRAL  - Adult; Outpatient Treatment, Psychiatry; Individual/Couples/Family/Group Therapy/Health Psychology; Other: [facility] [phone]; We will contact you to schedule the appointment or please call with any question...       I explained my diagnostic considerations and recommendations to the patient, who voiced understanding and agreement with the treatment plan. All questions were answered. We discussed potential side effects of any prescribed or recommended therapies, as well as expectations for response to treatments.    Patient was advised to allow up to 2 days for response on lab results via MyChart and or letter to be sent.    FOLLOW-UP:    Return if symptoms worsen or fail to improve.     Clinic : [phone]  Appointment line: [phone]     ANTONIETA Llamas, AGNP-C  Internal Medicine  08/20/2021 3:00 PM  _____________________________________________________________________________________    Total time spent with this patient was 22 minutes which included chart review, visualization and interpretation of labs and/or images, time spent with patient, and documentation.

## 2021-08-20 NOTE — NURSING NOTE
"Chief Complaint   Patient presents with     Recheck Medication     Med Review       Initial BP (!) 144/94 (BP Location: Right arm, Patient Position: Sitting, Cuff Size: Adult Large)   Pulse 99   Temp 98.1  F (36.7  C) (Tympanic)   Resp 18   Wt 105 kg (231 lb 6.4 oz)   SpO2 97%   BMI 32.27 kg/m   Estimated body mass index is 32.27 kg/m  as calculated from the following:    Height as of 8/13/21: 1.803 m (5' 11\").    Weight as of this encounter: 105 kg (231 lb 6.4 oz).  Medication Reconciliation: complete  FOOD SECURITY SCREENING QUESTIONS  Hunger Vital Signs:  Within the past 12 months we worried whether our food would run out before we got money to buy more. Never  Within the past 12 months the food we bought just didn't last and we didn't have money to get more. Never    Advance care plan reviewed      Aditi Gibbs LPN    "

## 2021-08-21 ASSESSMENT — ANXIETY QUESTIONNAIRES: GAD7 TOTAL SCORE: 9

## 2021-09-15 ENCOUNTER — TRANSFERRED RECORDS (OUTPATIENT)
Dept: HEALTH INFORMATION MANAGEMENT | Facility: OTHER | Age: 56
End: 2021-09-15

## 2021-10-19 PROBLEM — F32.9 MAJOR DEPRESSION: Status: ACTIVE | Noted: 2020-11-11

## 2021-11-19 ENCOUNTER — OFFICE VISIT (OUTPATIENT)
Dept: PSYCHIATRY | Facility: OTHER | Age: 56
End: 2021-11-19
Attending: PSYCHIATRY & NEUROLOGY
Payer: COMMERCIAL

## 2021-11-19 VITALS
OXYGEN SATURATION: 98 % | RESPIRATION RATE: 16 BRPM | SYSTOLIC BLOOD PRESSURE: 152 MMHG | DIASTOLIC BLOOD PRESSURE: 90 MMHG | WEIGHT: 228.6 LBS | HEART RATE: 90 BPM | BODY MASS INDEX: 31.88 KG/M2 | TEMPERATURE: 97.6 F

## 2021-11-19 DIAGNOSIS — G47.00 INSOMNIA, UNSPECIFIED TYPE: ICD-10-CM

## 2021-11-19 DIAGNOSIS — F41.9 ANXIETY: ICD-10-CM

## 2021-11-19 DIAGNOSIS — F32.2 SEVERE MAJOR DEPRESSION (H): Primary | ICD-10-CM

## 2021-11-19 DIAGNOSIS — F51.5 NIGHTMARES: ICD-10-CM

## 2021-11-19 PROCEDURE — G0463 HOSPITAL OUTPT CLINIC VISIT: HCPCS

## 2021-11-19 PROCEDURE — 99215 OFFICE O/P EST HI 40 MIN: CPT | Performed by: PSYCHIATRY & NEUROLOGY

## 2021-11-19 RX ORDER — ALPRAZOLAM 0.25 MG
0.25 TABLET ORAL DAILY PRN
Qty: 30 TABLET | Refills: 1 | Status: SHIPPED | OUTPATIENT
Start: 2021-11-19 | End: 2022-01-26

## 2021-11-19 RX ORDER — TRAZODONE HYDROCHLORIDE 150 MG/1
150 TABLET ORAL AT BEDTIME
Qty: 30 TABLET | Refills: 4 | Status: SHIPPED | OUTPATIENT
Start: 2021-11-19 | End: 2022-06-29

## 2021-11-19 RX ORDER — PRAZOSIN HYDROCHLORIDE 5 MG/1
10 CAPSULE ORAL AT BEDTIME
Qty: 60 CAPSULE | Refills: 4 | Status: SHIPPED | OUTPATIENT
Start: 2021-11-19 | End: 2023-01-18

## 2021-11-19 RX ORDER — SERTRALINE HYDROCHLORIDE 100 MG/1
100 TABLET, FILM COATED ORAL DAILY
Qty: 90 TABLET | Refills: 1 | Status: SHIPPED | OUTPATIENT
Start: 2021-11-19 | End: 2022-06-29

## 2021-11-19 RX ORDER — PROPRANOLOL HYDROCHLORIDE 40 MG/1
40 TABLET ORAL 3 TIMES DAILY PRN
Qty: 90 TABLET | Refills: 4 | Status: SHIPPED | OUTPATIENT
Start: 2021-11-19 | End: 2023-01-18

## 2021-11-19 ASSESSMENT — ANXIETY QUESTIONNAIRES
7. FEELING AFRAID AS IF SOMETHING AWFUL MIGHT HAPPEN: MORE THAN HALF THE DAYS
2. NOT BEING ABLE TO STOP OR CONTROL WORRYING: MORE THAN HALF THE DAYS
3. WORRYING TOO MUCH ABOUT DIFFERENT THINGS: MORE THAN HALF THE DAYS
6. BECOMING EASILY ANNOYED OR IRRITABLE: MORE THAN HALF THE DAYS
1. FEELING NERVOUS, ANXIOUS, OR ON EDGE: SEVERAL DAYS
GAD7 TOTAL SCORE: 10
4. TROUBLE RELAXING: SEVERAL DAYS
8. IF YOU CHECKED OFF ANY PROBLEMS, HOW DIFFICULT HAVE THESE MADE IT FOR YOU TO DO YOUR WORK, TAKE CARE OF THINGS AT HOME, OR GET ALONG WITH OTHER PEOPLE?: EXTREMELY DIFFICULT
GAD7 TOTAL SCORE: 10
5. BEING SO RESTLESS THAT IT IS HARD TO SIT STILL: NOT AT ALL
7. FEELING AFRAID AS IF SOMETHING AWFUL MIGHT HAPPEN: MORE THAN HALF THE DAYS
GAD7 TOTAL SCORE: 10

## 2021-11-19 ASSESSMENT — PATIENT HEALTH QUESTIONNAIRE - PHQ9
SUM OF ALL RESPONSES TO PHQ QUESTIONS 1-9: 8
SUM OF ALL RESPONSES TO PHQ QUESTIONS 1-9: 8
10. IF YOU CHECKED OFF ANY PROBLEMS, HOW DIFFICULT HAVE THESE PROBLEMS MADE IT FOR YOU TO DO YOUR WORK, TAKE CARE OF THINGS AT HOME, OR GET ALONG WITH OTHER PEOPLE: EXTREMELY DIFFICULT

## 2021-11-19 ASSESSMENT — PAIN SCALES - GENERAL: PAINLEVEL: MILD PAIN (3)

## 2021-11-19 NOTE — NURSING NOTE
"Chief Complaint   Patient presents with     Follow Up     Anxiety and depression medication       FOOD SECURITY SCREENING QUESTIONS  Hunger Vital Signs:  Within the past 12 months we worried whether our food would run out before we got money to buy more. Never  Within the past 12 months the food we bought just didn't last and we didn't have money to get more. Never  Kiera Tran LPN 11/19/2021 1:38 PM      Initial BP (!) 152/90 (BP Location: Right arm, Patient Position: Sitting, Cuff Size: Adult Large)   Pulse 90   Temp 97.6  F (36.4  C) (Tympanic)   Resp 16   Wt 103.7 kg (228 lb 9.6 oz)   SpO2 98%   BMI 31.88 kg/m   Estimated body mass index is 31.88 kg/m  as calculated from the following:    Height as of 8/13/21: 1.803 m (5' 11\").    Weight as of this encounter: 103.7 kg (228 lb 9.6 oz).  Medication Reconciliation: complete    Kiera Tran LPN  "

## 2021-11-19 NOTE — PROGRESS NOTES
"Psychiatric Progress Note/Visit  November 19, 2021    Identifying Data:  This is a 56-year-old man seen for psychiatric medication management follow-up visit for treatment of depression, anxiety and insomnia    Interval History:  He was seen most recently on August 13, 2021.  At that time he was complaining of being too sedated in the morning when he took trazodone the night before and also was complaining of \"terrible dreams\" that would wake him during the night.  We decided to increase his Zoloft, try Ambien for sleep, try prazosin for dreams, and try propranolol as needed for anxiety.  Today he reports that he did not like how he felt with the Ambien and went back to taking 150 mg of trazodone at night and feels that it is working reasonably well for his sleep.  He feels that the combination of Wellbutrin  mg and Zoloft 100 mg is starting to work well for his depression and anxiety.  He tried using propranolol up to 20 mg at a time and did not notice any negative effects, but also did not feel that it was very effective.  He only tried 2 mg of prazosin and did not find it helpful.  He has had a diagnostic assessment at [facility] and will be starting individual therapy next week    Mental Status Exam:  Depression has decreased, currently mild.  Anxiety has also decreased, currently mild.  Remainder of MSE is essentially unchanged from August 13, 2021.    Current Psychiatric Medications:  Wellbutrin  mg a day  Zoloft 100 mg a day  Trazodone 150 mg at bedtime  Xanax 0.25 mg as needed    Lab Tests/Results:  No laboratory studies were ordered previously and none are being ordered today    Diagnosis:  Major depression, recurrent, marked  Anxiety, unspecified  Insomnia    Impression/Assessment:  He is starting to respond to the combination of Wellbutrin and Zoloft with overall decrease of depression and anxiety.  He is sleeping reasonably well on trazodone at bedtime.  He has not had " adequate trials of prazosin or propranolol and we discussed trying these medications again at therapeutic dosages.  He will be starting individual psychotherapy shortly    Plan:  1.  Increase prazosin to 5-14 mg at bedtime for nightmares  2.  Increase propranolol to 40 mg as needed for anxiety  3.  Continue Wellbutrin  mg a day for depression  4.  Continue Zoloft 100 mg a day for depression and anxiety  5.  Continue Xanax 0.25 mg as needed for breakthrough anxiety  6.  Start and continue individual psychotherapy  7.  Follow-up with Dr. Mason in 2 months    Time spent on day of visit 63 minutes-13 minutes (12:51 PM through 1:04 PM)  review of EMR prior to visit, 41 minutes (1:40 PM through 2:21 PM)  face-to-face meeting with patient detailed verbal and written instructions on medication dosage adjustments,, including discussion of risk/benefits, alternatives and possible side effects to medication, counseling on above issues, and prescription of medications in the EMR, 9 minutes (2:21 PM through 2:30 PM)  documentation in the EMR      Jatin Mason MD    Answers for HPI/ROS submitted by the patient on 11/19/2021  If you checked off any problems, how difficult have these problems made it for you to do your work, take care of things at home, or get along with other people?: Extremely difficult  PHQ9 TOTAL SCORE: 8  SHANA 7 TOTAL SCORE: 10

## 2021-11-20 ASSESSMENT — ANXIETY QUESTIONNAIRES: GAD7 TOTAL SCORE: 10

## 2021-11-20 ASSESSMENT — PATIENT HEALTH QUESTIONNAIRE - PHQ9: SUM OF ALL RESPONSES TO PHQ QUESTIONS 1-9: 8

## 2022-01-26 ENCOUNTER — OFFICE VISIT (OUTPATIENT)
Dept: PSYCHIATRY | Facility: OTHER | Age: 57
End: 2022-01-26
Attending: PSYCHIATRY & NEUROLOGY
Payer: COMMERCIAL

## 2022-01-26 VITALS
OXYGEN SATURATION: 97 % | HEART RATE: 96 BPM | DIASTOLIC BLOOD PRESSURE: 95 MMHG | SYSTOLIC BLOOD PRESSURE: 155 MMHG | WEIGHT: 246 LBS | RESPIRATION RATE: 17 BRPM | BODY MASS INDEX: 34.31 KG/M2

## 2022-01-26 DIAGNOSIS — F41.9 ANXIETY: Primary | ICD-10-CM

## 2022-01-26 DIAGNOSIS — F32.2 SEVERE MAJOR DEPRESSION (H): ICD-10-CM

## 2022-01-26 PROCEDURE — G0463 HOSPITAL OUTPT CLINIC VISIT: HCPCS

## 2022-01-26 PROCEDURE — 99215 OFFICE O/P EST HI 40 MIN: CPT | Performed by: PSYCHIATRY & NEUROLOGY

## 2022-01-26 RX ORDER — BUPROPION HYDROCHLORIDE 300 MG/1
300 TABLET ORAL EVERY MORNING
Qty: 90 TABLET | Refills: 1 | Status: SHIPPED | OUTPATIENT
Start: 2022-01-26 | End: 2022-06-29

## 2022-01-26 RX ORDER — ALPRAZOLAM 0.25 MG
0.25 TABLET ORAL DAILY PRN
Qty: 30 TABLET | Refills: 5 | Status: SHIPPED | OUTPATIENT
Start: 2022-01-26 | End: 2022-06-29

## 2022-01-26 ASSESSMENT — ANXIETY QUESTIONNAIRES
5. BEING SO RESTLESS THAT IT IS HARD TO SIT STILL: NOT AT ALL
4. TROUBLE RELAXING: MORE THAN HALF THE DAYS
GAD7 TOTAL SCORE: 13
7. FEELING AFRAID AS IF SOMETHING AWFUL MIGHT HAPPEN: NEARLY EVERY DAY
7. FEELING AFRAID AS IF SOMETHING AWFUL MIGHT HAPPEN: NEARLY EVERY DAY
6. BECOMING EASILY ANNOYED OR IRRITABLE: MORE THAN HALF THE DAYS
2. NOT BEING ABLE TO STOP OR CONTROL WORRYING: MORE THAN HALF THE DAYS
3. WORRYING TOO MUCH ABOUT DIFFERENT THINGS: MORE THAN HALF THE DAYS
GAD7 TOTAL SCORE: 13
GAD7 TOTAL SCORE: 13
1. FEELING NERVOUS, ANXIOUS, OR ON EDGE: MORE THAN HALF THE DAYS

## 2022-01-26 ASSESSMENT — PAIN SCALES - GENERAL: PAINLEVEL: NO PAIN (0)

## 2022-01-26 ASSESSMENT — PATIENT HEALTH QUESTIONNAIRE - PHQ9
SUM OF ALL RESPONSES TO PHQ QUESTIONS 1-9: 10
SUM OF ALL RESPONSES TO PHQ QUESTIONS 1-9: 10
10. IF YOU CHECKED OFF ANY PROBLEMS, HOW DIFFICULT HAVE THESE PROBLEMS MADE IT FOR YOU TO DO YOUR WORK, TAKE CARE OF THINGS AT HOME, OR GET ALONG WITH OTHER PEOPLE: VERY DIFFICULT

## 2022-01-26 NOTE — NURSING NOTE
"Chief Complaint   Patient presents with     Anxiety     Insomnia     Depression       Initial BP (!) 155/95   Pulse 96   Resp 17   Wt 111.6 kg (246 lb)   SpO2 97%   BMI 34.31 kg/m   Estimated body mass index is 34.31 kg/m  as calculated from the following:    Height as of 8/13/21: 1.803 m (5' 11\").    Weight as of this encounter: 111.6 kg (246 lb).  Medication Reconciliation: complete    FOOD SECURITY SCREENING QUESTIONS  Hunger Vital Signs:  Within the past 12 months we worried whether our food would run out before we got money to buy more. Never  Within the past 12 months the food we bought just didn't last and we didn't have money to get more. Never  Dorcas Rajput LPN 1/26/2022 8:42 AM             Dorcas Rajput LPN  "

## 2022-01-26 NOTE — PROGRESS NOTES
Psychiatric Progress Note/Visit  2022    Identifying Data:  This is a 56 year old man seen for follow-up psychiatric medication management visit for treatment of depression, anxiety and insomnia    Interval History:  He was seen most recently on 2021. At that time he reported sleeping well on 150 mg trazodone. He was tolerating prazosin at 2 mg, but was still having nightmares. He also was tolerating as needed propranolol 20 mg with no negative effects, but not clinically helpful. We decided to increase dosages of both.  Today he reports that a close friend of his recently  unexpectedly of a heart attack and he is upset that it is bothering him so much.  I encouraged him to accept that this is a normal grief reaction and to allow himself to go through his feelings, when he can.  He also reported that he started individual psychotherapy and feels comfortable with his therapist.  Overall he reports that he has been doing better since our last visit and he is doing well on 5 mg of prazosin at night and is getting good benefit from as needed 40 mg of propranolol for anxiety.    Mental Status Exam:  Anxiety has decreased, currently minimal.  Depression continues to decrease, but he is feeling some acute grief and depression would be considered mild currently.  Remainder of MSE is essentially unchanged from 2021.    Current Psychiatric Medications:  Wellbutrin  mg a day  Zoloft 100 mg a day  Trazodone 150 mg at bedtime  Prazosin 5 mg at bedtime  Propranolol 40 mg as needed  Xanax 0.25 mg as needed    Lab Tests/Results:  No laboratory studies were done previously and none are being ordered today.    Diagnosis:  Major depression, recurrent, severe without psychosis  Anxiety, unspecified  Insomnia    Impression/Assessment:  He is making good progress with his treatment and has responded well to increased dosages of bedtime prazosin and as needed propranolol.  He has started  individual psychotherapy and is comfortable with his therapist.  He is tolerating his current medications without adverse effects.  He continues to use as needed Xanax minimally and is also able to use as needed propranolol for breakthrough anxiety.    Plan:  1.   Continue Wellbutrin XL for depression  2.   Continue Zoloft for depression and anxiety  3.   Continue trazodone for insomnia  4.   Continue prazosin for nightmares  5.   Continue as needed Xanax or propranolol for breakthrough anxiety  6.   Continue individual psychotherapy  7.   Follow-up with Dr. Mason in 2 months    Time spent on day of visit 50 minutes-10 minutes (7:27 AM through 7:37 AM)  review of EMR prior to visit, 27 minutes (8:44 AM through 9:11 AM)  face-to-face meeting with patient, including discussion of risk/benefits, alternatives and possible side effects to medication, counseling on above issues, and prescription of medications in the EMR, 13 minutes (9:11 AM through 9:24 AM)  documentation in the EMR      Jatin Mason MD    Answers for HPI/ROS submitted by the patient on 1/26/2022  If you checked off any problems, how difficult have these problems made it for you to do your work, take care of things at home, or get along with other people?: Very difficult  PHQ9 TOTAL SCORE: 10  SHANA 7 TOTAL SCORE: 13

## 2022-01-27 ASSESSMENT — ANXIETY QUESTIONNAIRES: GAD7 TOTAL SCORE: 13

## 2022-03-07 ENCOUNTER — TELEPHONE (OUTPATIENT)
Dept: PSYCHIATRY | Facility: OTHER | Age: 57
End: 2022-03-07
Payer: COMMERCIAL

## 2022-03-07 NOTE — TELEPHONE ENCOUNTER
They are checking to see if [facility] received the assessment form they faxed over for this patient.     Zuleyka Mcqueen on 3/7/2022 at 2:58 PM

## 2022-03-07 NOTE — TELEPHONE ENCOUNTER
Per Dr. Mason:  I do not see any reason why this person could not work from a psychiatric point of view. In my original evaluation he stated that he was trying to get on disability for GI problems. Maybe he would qualify on that basis.   Please be sure to send any progress notes from me to whoever might need them. But I am not planning on filling out any forms recommending work limitations for psychiatric reasons.    Routing comment

## 2022-06-29 ENCOUNTER — OFFICE VISIT (OUTPATIENT)
Dept: PSYCHIATRY | Facility: OTHER | Age: 57
End: 2022-06-29
Attending: PSYCHIATRY & NEUROLOGY
Payer: COMMERCIAL

## 2022-06-29 VITALS
TEMPERATURE: 98.1 F | RESPIRATION RATE: 18 BRPM | OXYGEN SATURATION: 97 % | BODY MASS INDEX: 32.94 KG/M2 | SYSTOLIC BLOOD PRESSURE: 170 MMHG | WEIGHT: 236.2 LBS | HEART RATE: 92 BPM | DIASTOLIC BLOOD PRESSURE: 112 MMHG

## 2022-06-29 DIAGNOSIS — F32.2 SEVERE MAJOR DEPRESSION (H): Primary | ICD-10-CM

## 2022-06-29 DIAGNOSIS — G47.00 INSOMNIA, UNSPECIFIED TYPE: ICD-10-CM

## 2022-06-29 PROCEDURE — G0463 HOSPITAL OUTPT CLINIC VISIT: HCPCS

## 2022-06-29 PROCEDURE — 99214 OFFICE O/P EST MOD 30 MIN: CPT | Performed by: PSYCHIATRY & NEUROLOGY

## 2022-06-29 RX ORDER — BUPROPION HYDROCHLORIDE 300 MG/1
300 TABLET ORAL EVERY MORNING
Qty: 90 TABLET | Refills: 1 | Status: SHIPPED | OUTPATIENT
Start: 2022-06-29 | End: 2023-01-18

## 2022-06-29 RX ORDER — SERTRALINE HYDROCHLORIDE 100 MG/1
100 TABLET, FILM COATED ORAL DAILY
Qty: 90 TABLET | Refills: 1 | Status: SHIPPED | OUTPATIENT
Start: 2022-06-29 | End: 2023-01-18

## 2022-06-29 RX ORDER — TRAZODONE HYDROCHLORIDE 150 MG/1
150 TABLET ORAL AT BEDTIME
Qty: 90 TABLET | Refills: 1 | Status: SHIPPED | OUTPATIENT
Start: 2022-06-29 | End: 2023-01-18

## 2022-06-29 ASSESSMENT — ANXIETY QUESTIONNAIRES
7. FEELING AFRAID AS IF SOMETHING AWFUL MIGHT HAPPEN: MORE THAN HALF THE DAYS
5. BEING SO RESTLESS THAT IT IS HARD TO SIT STILL: NOT AT ALL
3. WORRYING TOO MUCH ABOUT DIFFERENT THINGS: MORE THAN HALF THE DAYS
GAD7 TOTAL SCORE: 8
4. TROUBLE RELAXING: SEVERAL DAYS
6. BECOMING EASILY ANNOYED OR IRRITABLE: SEVERAL DAYS
8. IF YOU CHECKED OFF ANY PROBLEMS, HOW DIFFICULT HAVE THESE MADE IT FOR YOU TO DO YOUR WORK, TAKE CARE OF THINGS AT HOME, OR GET ALONG WITH OTHER PEOPLE?: SOMEWHAT DIFFICULT
GAD7 TOTAL SCORE: 8
1. FEELING NERVOUS, ANXIOUS, OR ON EDGE: SEVERAL DAYS
2. NOT BEING ABLE TO STOP OR CONTROL WORRYING: SEVERAL DAYS
GAD7 TOTAL SCORE: 8
7. FEELING AFRAID AS IF SOMETHING AWFUL MIGHT HAPPEN: MORE THAN HALF THE DAYS

## 2022-06-29 ASSESSMENT — PATIENT HEALTH QUESTIONNAIRE - PHQ9
SUM OF ALL RESPONSES TO PHQ QUESTIONS 1-9: 5
SUM OF ALL RESPONSES TO PHQ QUESTIONS 1-9: 5
10. IF YOU CHECKED OFF ANY PROBLEMS, HOW DIFFICULT HAVE THESE PROBLEMS MADE IT FOR YOU TO DO YOUR WORK, TAKE CARE OF THINGS AT HOME, OR GET ALONG WITH OTHER PEOPLE: SOMEWHAT DIFFICULT

## 2022-06-29 ASSESSMENT — PAIN SCALES - GENERAL: PAINLEVEL: MILD PAIN (3)

## 2022-06-29 NOTE — NURSING NOTE
Patient is here for a follow up on anxiety, depression, and insomnia.     Medication Reconciliation: complete    FOOD SECURITY SCREENING QUESTIONS  Hunger Vital Signs:  Within the past 12 months we worried whether our food would run out before we got money to buy more. Never  Within the past 12 months the food we bought just didn't last and we didn't have money to get more. Never  Mell Fontenot LPN 6/29/2022 3:06 PM       Advance care directive on file? no  Advance care directive provided to patient? no       Mell Fontenot LPN

## 2022-06-29 NOTE — PROGRESS NOTES
"Psychiatric Progress Note/Visit  June 29, 2022    Identifying Data:  This is a 57-year-old man seen for follow-up psychiatric medication management visit for treatment of depression, anxiety and insomnia    Interval History:  He was seen most recently on January 26, 2022.  At that time he had recently started therapy and felt positive about it.  He felt that he was doing better taking 5 mg of prazosin at night and using propranolol 40 mg as needed.  We did not make any changes to his medications.  Today he reports that he is continuing to do better and says that he wants \"to stay on the same program\".  He also notes that \"things are calmer than they have been in 10 years\".  He reports that he is no longer using Xanax and only uses propranolol when needed.  He also notes that he is not using prazosin every night, but will take it when he feels a lot of stress and finds that it is effective.    Mental Status Exam:  Anxiety continues to decrease, currently minimal to absent.  Depression continues to decrease, currently minimal to absent.  He denies any suicidal ideation and says that that has never been a problem for him.  Remainder of MSE is essentially unchanged from January 26, 2022.    Current Psychiatric Medications:  Wellbutrin  mg a day  Zoloft 100 mg a day  Trazodone 150 mg at night  Prazosin 5 mg as needed at bedtime  Propranolol 40 mg as needed for breakthrough anxiety    Lab Tests/Results:  Laboratory studies were not ordered previously and they are not being ordered today.    Diagnosis:  Major depression, recurrent, severe without psychosis  Anxiety, unspecified  Insomnia    Impression/Assessment:  Lazaro continues to do very well on his current psychiatric medications and has been able to stop taking Xanax and only needs to use prazosin at times.  He continues to benefit from therapy and is very pleased that he now has the Internet and feels much more connected.    Plan:  1.   Continue Wellbutrin XL " 300 mg a day for depression  2.   Continue Zoloft 100 mg a day for depression and anxiety  3.   Continue trazodone 150 mg at bedtime for sleep maintenance/insomnia  4.   Continue prazosin 5 mg as needed at bedtime to prevent nightmares  5.   Continue propranolol 40 mg as needed for breakthrough anxiety  6.   Continue individual psychotherapy  7.   Follow-up with Dr. Mason in 3 months, or sooner if needed    Time spent on day of visit 35 minutes- 10 minutes (8:25 AM through 8:35 AM) review of EMR prior to visit, 17 minutes (3:07 PM through 3:24 PM) face-to-face meeting with patient, including discussion of risk/benefits, alternatives and possible side effects to medication, counseling on above issues, and prescription of medications in the EMR, 8 minutes (3:24 PM through 3:32 PM) documentation in the EMR      Jatin Mason MD    Answers for HPI/ROS submitted by the patient on 6/29/2022  If you checked off any problems, how difficult have these problems made it for you to do your work, take care of things at home, or get along with other people?: Somewhat difficult  PHQ9 TOTAL SCORE: 5  SHANA 7 TOTAL SCORE: 8

## 2022-08-25 DIAGNOSIS — R06.02 SHORTNESS OF BREATH: ICD-10-CM

## 2022-08-25 RX ORDER — ALBUTEROL SULFATE 90 UG/1
AEROSOL, METERED RESPIRATORY (INHALATION)
Qty: 18 G | OUTPATIENT
Start: 2022-08-25

## 2022-08-25 NOTE — TELEPHONE ENCOUNTER
Patient wanted you to know that you filled for 6 refills when he was in as always trouble getting them, they only had for the 2 so this does need to be filled-thanks  
mom

## 2022-08-26 RX ORDER — ALBUTEROL SULFATE 90 UG/1
AEROSOL, METERED RESPIRATORY (INHALATION)
Qty: 18 G | Refills: 4 | Status: SHIPPED | OUTPATIENT
Start: 2022-08-26 | End: 2023-05-17

## 2022-08-31 ENCOUNTER — TELEPHONE (OUTPATIENT)
Dept: PSYCHIATRY | Facility: OTHER | Age: 57
End: 2022-08-31

## 2022-08-31 DIAGNOSIS — F51.5 NIGHTMARES: ICD-10-CM

## 2022-08-31 DIAGNOSIS — F41.9 ANXIETY: Primary | ICD-10-CM

## 2022-08-31 RX ORDER — PROPRANOLOL HYDROCHLORIDE 40 MG/1
40 TABLET ORAL 3 TIMES DAILY PRN
Qty: 90 TABLET | Refills: 3 | Status: SHIPPED | OUTPATIENT
Start: 2022-08-31 | End: 2023-01-18

## 2022-08-31 RX ORDER — PRAZOSIN HYDROCHLORIDE 5 MG/1
5 CAPSULE ORAL AT BEDTIME
Qty: 30 CAPSULE | Refills: 3 | Status: SHIPPED | OUTPATIENT
Start: 2022-08-31 | End: 2023-01-18

## 2022-08-31 NOTE — TELEPHONE ENCOUNTER
Patient needed to cancel appointment, not feeling well. Needs refills on his meds.Can come in different day if needed would like the refills, low on meds.    Dimple Barrios on 8/31/2022 at 10:23 AM

## 2022-08-31 NOTE — TELEPHONE ENCOUNTER
I sent refills for propranolol and prazosin. There should be refills on the other 3 medications. Please let me know if that is not accurate and have the pharmacy send refill requests.    Also, please have him schedule another appointment

## 2022-09-26 DIAGNOSIS — F51.5 NIGHTMARES: ICD-10-CM

## 2022-09-29 RX ORDER — PRAZOSIN HYDROCHLORIDE 5 MG/1
CAPSULE ORAL
Qty: 60 CAPSULE | Refills: 4 | OUTPATIENT
Start: 2022-09-29

## 2022-09-29 NOTE — TELEPHONE ENCOUNTER
[facility] sent Rx request for the following:      PRAZOSIN HCL 5MG CAPSULES      Last Prescription Date:   8/31/2022  Last Fill Qty/Refills:         30, R-3        Redundant refill request refused: Too soon:    Rios Stern RN, BSN  ....................  9/29/2022   9:52 AM

## 2023-01-13 ENCOUNTER — NURSE TRIAGE (OUTPATIENT)
Dept: INTERNAL MEDICINE | Facility: OTHER | Age: 58
End: 2023-01-13

## 2023-01-13 NOTE — TELEPHONE ENCOUNTER
Patient requesting to be seen in clinic to follow up on concerning symptoms which occurred 10 days ago without recurrence. Scheduled first available IM appointment with DEWAYNE Mcpherson 1/18/23 2:00 PM and instructed patient to present to ER if before then symptoms return, new symptoms develop. Patient agreeable to plan and states nothing further is needed at this time. Beronica Malave RN on 1/13/2023 at 2:27 PM      Reason for Disposition    Patient wants to be seen    All other patients with chest pain (Exception: fleeting chest pain lasting a few seconds)    Additional Information    Negative: SEVERE difficulty breathing (e.g., struggling for each breath, speaks in single words)    Negative: Passed out (i.e., fainted, collapsed and was not responding)    Negative: Difficult to awaken or acting confused (e.g., disoriented, slurred speech)    Negative: Shock suspected (e.g., cold/pale/clammy skin, too weak to stand, low BP, rapid pulse)    Negative: Chest pain lasting longer than 5 minutes and ANY of the following:* Over 44 years old* Over 30 years old and at least one cardiac risk factor (e.g., diabetes mellitus, high blood pressure, high cholesterol, smoker, or strong family history of heart disease)* History of heart disease (i.e., angina, heart attack, heart failure, bypass surgery, takes nitroglycerin)* Pain is crushing, pressure-like, or heavy    Negative: Heart beating < 50 beats per minute OR > 140 beats per minute    Negative: Visible sweat on face or sweat dripping down face    Negative: Sounds like a life-threatening emergency to the triager    Negative: Followed an injury to chest    Negative: SEVERE chest pain    Negative: Pain also in shoulder(s) or arm(s) or jaw    Negative: Difficulty breathing    Negative: Cocaine use within last 3 days    Negative: Major surgery in the past month    Negative: Hip or leg fracture (broken bone) in past month (or had cast on leg or ankle in past month)    Negative: Illness  "requiring prolonged bedrest in past month (e.g., immobilization, long hospital stay)    Negative: Long-distance travel in past month (e.g., car, bus, train, plane; with trip lasting 6 or more hours)    Negative: History of prior 'blood clot' in leg or lungs (i.e., deep vein thrombosis, pulmonary embolism)    Negative: History of inherited increased risk of blood clots (e.g., Factor 5 Leiden, Anti-thrombin 3, Protein C or Protein S deficiency, Prothrombin mutation)    Negative: Cancer treatment in the past two months (or has cancer now)    Negative: Heart beating irregularly or very rapidly    Negative: Chest pain lasting longer than 5 minutes and occurred in last 3 days (72 hours) (Exception: feels exactly the same as previously diagnosed heartburn and has accompanying sour taste in mouth)    Negative: Chest pain or 'angina' comes and goes and is happening more often (increasing in frequency) or getting worse (increasing in severity) (Exception: chest pains that last only a few seconds)    Negative: Dizziness or lightheadedness    Negative: Coughing up blood    Negative: Patient sounds very sick or weak to the triager    Negative: Patient says chest pain feels exactly the same as previously diagnosed 'heartburn' and describes burning in chest and accompanying sour taste in mouth    Answer Assessment - Initial Assessment Questions  1. LOCATION: \"Where does it hurt?\"        Chest pain  2. RADIATION: \"Does the pain go anywhere else?\" (e.g., into neck, jaw, arms, back)      Left arm numbness and tingly  3. ONSET: \"When did the chest pain begin?\" (Minutes, hours or days)       10 days ago, not since then  4. PATTERN \"Does the pain come and go, or has it been constant since it started?\"  \"Does it get worse with exertion?\"       Hasn't happened since then  5. DURATION: \"How long does it last\" (e.g., seconds, minutes, hours)      A few minutes  6. SEVERITY: \"How bad is the pain?\"  (e.g., Scale 1-10; mild, moderate, or " "severe)     - MILD (1-3): doesn't interfere with normal activities      - MODERATE (4-7): interferes with normal activities or awakens from sleep     - SEVERE (8-10): excruciating pain, unable to do any normal activities        Not severe  7. CARDIAC RISK FACTORS: \"Do you have any history of heart problems or risk factors for heart disease?\" (e.g., angina, prior heart attack; diabetes, high blood pressure, high cholesterol, smoker, or strong family history of heart disease)     Was a heavy smoker for 30 years    Was on BP medication years ago; hasn't been on it for quite some time and is slightly elevated  8. PULMONARY RISK FACTORS: \"Do you have any history of lung disease?\"  (e.g., blood clots in lung, asthma, emphysema, birth control pills)      Denies but used an inhaler  9. CAUSE: \"What do you think is causing the chest pain?\"      Light physical exertion, was moving a trailer  10. OTHER SYMPTOMS: \"Do you have any other symptoms?\" (e.g., dizziness, nausea, vomiting, sweating, fever, difficulty breathing, cough)        Short of breath, vomited the next morning  11. PREGNANCY: \"Is there any chance you are pregnant?\" \"When was your last menstrual period?\"        n/a    Protocols used: CHEST PAIN-A-[place]      "

## 2023-01-18 ENCOUNTER — OFFICE VISIT (OUTPATIENT)
Dept: INTERNAL MEDICINE | Facility: OTHER | Age: 58
End: 2023-01-18
Attending: NURSE PRACTITIONER
Payer: MEDICAID

## 2023-01-18 VITALS
TEMPERATURE: 97.9 F | WEIGHT: 253.2 LBS | SYSTOLIC BLOOD PRESSURE: 146 MMHG | OXYGEN SATURATION: 98 % | DIASTOLIC BLOOD PRESSURE: 88 MMHG | RESPIRATION RATE: 14 BRPM | HEART RATE: 100 BPM | BODY MASS INDEX: 35.31 KG/M2

## 2023-01-18 DIAGNOSIS — Z72.0 TOBACCO ABUSE: ICD-10-CM

## 2023-01-18 DIAGNOSIS — Z13.220 SCREENING FOR HYPERLIPIDEMIA: ICD-10-CM

## 2023-01-18 DIAGNOSIS — R07.89 ATYPICAL CHEST PAIN: Primary | ICD-10-CM

## 2023-01-18 DIAGNOSIS — Z13.1 SCREENING FOR DIABETES MELLITUS: ICD-10-CM

## 2023-01-18 DIAGNOSIS — E66.01 MORBID OBESITY (H): ICD-10-CM

## 2023-01-18 DIAGNOSIS — F32.2 SEVERE MAJOR DEPRESSION (H): ICD-10-CM

## 2023-01-18 LAB
ALBUMIN SERPL BCG-MCNC: 4.3 G/DL (ref 3.5–5.2)
ALP SERPL-CCNC: 96 U/L (ref 40–129)
ALT SERPL W P-5'-P-CCNC: 38 U/L (ref 10–50)
ANION GAP SERPL CALCULATED.3IONS-SCNC: 10 MMOL/L (ref 7–15)
AST SERPL W P-5'-P-CCNC: 32 U/L (ref 10–50)
BASOPHILS # BLD AUTO: 0.1 10E3/UL (ref 0–0.2)
BASOPHILS NFR BLD AUTO: 1 %
BILIRUB SERPL-MCNC: 0.8 MG/DL
BUN SERPL-MCNC: 7.4 MG/DL (ref 8–23)
CALCIUM SERPL-MCNC: 9.3 MG/DL (ref 8.6–10)
CHLORIDE SERPL-SCNC: 98 MMOL/L (ref 98–107)
CHOLEST SERPL-MCNC: 140 MG/DL
CREAT SERPL-MCNC: 0.79 MG/DL (ref 0.67–1.17)
DEPRECATED HCO3 PLAS-SCNC: 27 MMOL/L (ref 22–29)
EOSINOPHIL # BLD AUTO: 0.2 10E3/UL (ref 0–0.7)
EOSINOPHIL NFR BLD AUTO: 2 %
ERYTHROCYTE [DISTWIDTH] IN BLOOD BY AUTOMATED COUNT: 13.1 % (ref 10–15)
GFR SERPL CREATININE-BSD FRML MDRD: >90 ML/MIN/1.73M2
GLUCOSE SERPL-MCNC: 112 MG/DL (ref 70–99)
HBA1C MFR BLD: 5.5 % (ref 4–6.2)
HCT VFR BLD AUTO: 57 % (ref 40–53)
HDLC SERPL-MCNC: 54 MG/DL
HGB BLD-MCNC: 20 G/DL (ref 13.3–17.7)
IMM GRANULOCYTES # BLD: 0 10E3/UL
IMM GRANULOCYTES NFR BLD: 0 %
LDLC SERPL CALC-MCNC: 68 MG/DL
LYMPHOCYTES # BLD AUTO: 2.6 10E3/UL (ref 0.8–5.3)
LYMPHOCYTES NFR BLD AUTO: 26 %
MAGNESIUM SERPL-MCNC: 2.2 MG/DL (ref 1.7–2.3)
MCH RBC QN AUTO: 35.3 PG (ref 26.5–33)
MCHC RBC AUTO-ENTMCNC: 35.1 G/DL (ref 31.5–36.5)
MCV RBC AUTO: 101 FL (ref 78–100)
MONOCYTES # BLD AUTO: 0.9 10E3/UL (ref 0–1.3)
MONOCYTES NFR BLD AUTO: 9 %
NEUTROPHILS # BLD AUTO: 6.5 10E3/UL (ref 1.6–8.3)
NEUTROPHILS NFR BLD AUTO: 62 %
NONHDLC SERPL-MCNC: 86 MG/DL
NRBC # BLD AUTO: 0 10E3/UL
NRBC BLD AUTO-RTO: 0 /100
PLATELET # BLD AUTO: 193 10E3/UL (ref 150–450)
POTASSIUM SERPL-SCNC: 4.3 MMOL/L (ref 3.4–5.3)
PROT SERPL-MCNC: 7.2 G/DL (ref 6.4–8.3)
RBC # BLD AUTO: 5.66 10E6/UL (ref 4.4–5.9)
SODIUM SERPL-SCNC: 135 MMOL/L (ref 136–145)
TRIGL SERPL-MCNC: 89 MG/DL
TSH SERPL DL<=0.005 MIU/L-ACNC: 1.29 UIU/ML (ref 0.3–4.2)
VIT B12 SERPL-MCNC: 524 PG/ML (ref 232–1245)
WBC # BLD AUTO: 10.2 10E3/UL (ref 4–11)

## 2023-01-18 PROCEDURE — 99215 OFFICE O/P EST HI 40 MIN: CPT | Performed by: NURSE PRACTITIONER

## 2023-01-18 PROCEDURE — 93010 ELECTROCARDIOGRAM REPORT: CPT | Performed by: STUDENT IN AN ORGANIZED HEALTH CARE EDUCATION/TRAINING PROGRAM

## 2023-01-18 PROCEDURE — 83735 ASSAY OF MAGNESIUM: CPT | Mod: ZL | Performed by: NURSE PRACTITIONER

## 2023-01-18 PROCEDURE — 85025 COMPLETE CBC W/AUTO DIFF WBC: CPT | Mod: ZL | Performed by: NURSE PRACTITIONER

## 2023-01-18 PROCEDURE — 83036 HEMOGLOBIN GLYCOSYLATED A1C: CPT | Mod: ZL | Performed by: NURSE PRACTITIONER

## 2023-01-18 PROCEDURE — 36415 COLL VENOUS BLD VENIPUNCTURE: CPT | Mod: ZL | Performed by: NURSE PRACTITIONER

## 2023-01-18 PROCEDURE — 93005 ELECTROCARDIOGRAM TRACING: CPT | Performed by: NURSE PRACTITIONER

## 2023-01-18 PROCEDURE — 82306 VITAMIN D 25 HYDROXY: CPT | Mod: ZL | Performed by: NURSE PRACTITIONER

## 2023-01-18 PROCEDURE — 80061 LIPID PANEL: CPT | Mod: ZL | Performed by: NURSE PRACTITIONER

## 2023-01-18 PROCEDURE — G0463 HOSPITAL OUTPT CLINIC VISIT: HCPCS

## 2023-01-18 PROCEDURE — 84443 ASSAY THYROID STIM HORMONE: CPT | Mod: ZL | Performed by: NURSE PRACTITIONER

## 2023-01-18 PROCEDURE — 80053 COMPREHEN METABOLIC PANEL: CPT | Mod: ZL | Performed by: NURSE PRACTITIONER

## 2023-01-18 PROCEDURE — 82607 VITAMIN B-12: CPT | Mod: ZL | Performed by: NURSE PRACTITIONER

## 2023-01-18 RX ORDER — BUPROPION HYDROCHLORIDE 300 MG/1
300 TABLET ORAL EVERY MORNING
Qty: 90 TABLET | Refills: 1 | Status: SHIPPED | OUTPATIENT
Start: 2023-01-18 | End: 2023-07-19

## 2023-01-18 RX ORDER — SERTRALINE HYDROCHLORIDE 100 MG/1
100 TABLET, FILM COATED ORAL DAILY
Qty: 90 TABLET | Refills: 1 | Status: SHIPPED | OUTPATIENT
Start: 2023-01-18 | End: 2023-07-19

## 2023-01-18 ASSESSMENT — PAIN SCALES - GENERAL: PAINLEVEL: MILD PAIN (3)

## 2023-01-18 ASSESSMENT — PATIENT HEALTH QUESTIONNAIRE - PHQ9
SUM OF ALL RESPONSES TO PHQ QUESTIONS 1-9: 5
10. IF YOU CHECKED OFF ANY PROBLEMS, HOW DIFFICULT HAVE THESE PROBLEMS MADE IT FOR YOU TO DO YOUR WORK, TAKE CARE OF THINGS AT HOME, OR GET ALONG WITH OTHER PEOPLE: SOMEWHAT DIFFICULT
SUM OF ALL RESPONSES TO PHQ QUESTIONS 1-9: 5

## 2023-01-18 NOTE — NURSING NOTE
"Chief Complaint   Patient presents with     Chest Pain     Left sided chest pain, radiates down L arm,hand, groin, leg   Patient presents for left sided chest pain that radiates down Left arm, to hand, groin and left leg; states he had a bad episode 11/27/22, that felt like when he broke his arms, pain wise.  Episode was accompanied by SOB, so patient used his inhaler and then had a shaking, seizure like episode.  Patient states next day he threw up, but then felt better.  Patient didn't go in at that time and it improved, but has been having smaller, similar episodes since.   Patient states that he has since stopped all medications and only uses inhaler, and started taking aspirin daily, along with magnesium.    Initial BP (!) 148/90 (BP Location: Right arm, Patient Position: Sitting, Cuff Size: Adult Large)   Pulse 100   Temp 97.9  F (36.6  C) (Temporal)   Resp 14   Wt 114.9 kg (253 lb 3.2 oz)   SpO2 98%   BMI 35.31 kg/m   Estimated body mass index is 35.31 kg/m  as calculated from the following:    Height as of 8/13/21: 1.803 m (5' 11\").    Weight as of this encounter: 114.9 kg (253 lb 3.2 oz).     Medication Reconciliation: complete      FOOD SECURITY SCREENING QUESTIONS:    The next two questions are to help us understand your food security.  If you are feeling you need any assistance in this area, we have resources available to support you today.    Hunger Vital Signs:  Within the past 12 months we worried whether our food would run out before we got money to buy more. Never  Within the past 12 months the food we bought just didn't last and we didn't have money to get more. Never        Advance care plan reviewed      Aditi Gibbs LPN on 1/18/2023 at 2:26 PM      "

## 2023-01-18 NOTE — PROGRESS NOTES
Assessment & Plan     Severe major depression (H)  He is overdue for annual recommended screenings.  We will go ahead and get labs today since he is here.  He is strongly encouraged to continue his sertraline dose and bupropion dose.  - sertraline (ZOLOFT) 100 MG tablet  Dispense: 90 tablet; Refill: 1  - buPROPion (WELLBUTRIN XL) 300 MG 24 hr tablet  Dispense: 90 tablet; Refill: 1  - TSH Reflex GH, in goal range.  Rescreen annually.  - Vitamin B12, in goal range.  - Vitamin D Total, very low at 12.  Recommend he be started on a weekly treatment of 50,000 units and an additional daily treatment of 2000 units.  Prescription sent to his pharmacy.  Plan to recheck in 3 months      Atypical chest pain  EKG is normal in clinic today.  Patient reassured.  I do recommend however that he have an echo stress test completed and he establish care with cardiology.  He really needs to quit smoking and we talked about this at great length.  His cheeks and nose and forehead are blue purpleish in color due to lack of oxygen from smoking.  - EKG 12-lead, tracing only  - Echocardiogram Exercise Stress  - Adult [facility] Referral  - CBC and Differential, hemoglobin is elevated, because from smoking.  Otherwise labs are unremarkable  - Comprehensive metabolic panel, sodium slightly decreased at 135, BUN low at 7.4 glucose at 112 however it is late in the day.  - Magnesium unremarkable at 2.2.  Rescreen as needed  - Greater than 3 minutes were spent on smoking cessation including encouragement to reduce cigarette use and discussion of modalities to assist with this.  - Cessation was encouraged in order to improve pain, reduce mortality, improve quality of life, and long term outcomes.  - NRT offered and patient states he has some at home    Screening for diabetes mellitus  He is overdue for annual recommended screenings.  We will go ahead and get labs today since he is here.  - Hemoglobin A1c, in goal range at 5.5.  No  "diabetes.  Rescreen annually.    Screening for hyperlipidemia  He is overdue for annual recommended screenings.  We will go ahead and get labs today since he is here.  - Lipid Panel, in normal range.  Rescreen annually.      Ordering of each unique test  Prescription drug management  62 minutes spent on the date of the encounter doing chart review, history and exam, documentation and further activities per the note     Nicotine/Tobacco Cessation:  He reports that he has been smoking cigarettes. He has been smoking an average of .25 packs per day. He has never used smokeless tobacco.  Nicotine/Tobacco Cessation Plan:   Information offered: Patient not interested at this time.       Return if symptoms worsen or fail to improve, for Can follow up after testing has been completed/sees cardiology - maximum of 3 months.    Alyse Mcpherson NP  [facility]   Lazaro is a 57 year old, presenting for the following health issues: Patient presents with:  Chest Pain: Left sided chest pain, radiates down L arm,hand, groin, leg    Patient presents for left sided chest pain that radiates down Left arm, to hand, groin and left leg; states he had a bad episode 11/27/22, that felt like when he broke his arms, pain wise.  Episode was accompanied by SOB, so patient used his inhaler and then had a whole body shaking, \"seizure like episode\".  Patient states next day he threw up, but then felt better.  Patient didn't go in at that time and it improved, but has been having smaller, similar episodes since.   Patient states that he has since stopped all medications and only uses inhaler, and started taking aspirin daily, along with magnesium.    Feels his aspirin has helped his symptoms but he is not suppose to take aspirin due to prior gastric issues.    Patient called nurse triage line with complaints of chest pain x 1.    Answer Assessment - Initial Assessment Questions  1. LOCATION: \"Where does it hurt?\" " "  Chest pain  2. RADIATION: \"Does the pain go anywhere else?\" (e.g., into neck, jaw, arms, back) Left arm numbness and tingly  3. ONSET: \"When did the chest pain begin?\" (Minutes, hours or days)  10 days ago, not since then  4. PATTERN \"Does the pain come and go, or has it been constant since it started?\"  \"Does it get worse with exertion?\"  Hasn't happened since then  5. DURATION: \"How long does it last\" (e.g., seconds, minutes, hours)     A few minutes  6. SEVERITY: \"How bad is the pain?\"  (e.g., Scale 1-10; mild, moderate, or severe)     - MILD (1-3): doesn't interfere with normal activities      - MODERATE (4-7): interferes with normal activities or awakens from sleep     - SEVERE (8-10): excruciating pain, unable to do any normal activities        Not severe  7. CARDIAC RISK FACTORS: \"Do you have any history of heart problems or risk factors for heart disease?\" (e.g., angina, prior heart attack; diabetes, high blood pressure, high cholesterol, smoker, or strong family history of heart disease)   Was a heavy smoker for 30 years    Was on BP medication years ago; hasn't been on it for quite some time and is slightly elevated  8. PULMONARY RISK FACTORS: \"Do you have any history of lung disease?\"  (e.g., blood clots in lung, asthma, emphysema, birth control pills)     Denies but used an inhaler  9. CAUSE: \"What do you think is causing the chest pain?\"     Light physical exertion, was moving a trailer  10. OTHER SYMPTOMS: \"Do you have any other symptoms?\" (e.g., dizziness, nausea, vomiting, sweating, fever, difficulty breathing, cough)       Short of breath, vomited the next morning  11. PREGNANCY: \"Is there any chance you are pregnant?\" \"When was your last menstrual period?\"       n/a    Has seen Marlon in the past for mental health issues: Per last note:  Impression/Assessment:  Lazaro continues to do very well on his current psychiatric medications and has been able to stop taking Xanax and only needs to use " prazosin at times.  He continues to benefit from therapy and is very pleased that he now has the Internet and feels much more connected.  Plan:  1.   Continue Wellbutrin  mg a day for depression  2.   Continue Zoloft 100 mg a day for depression and anxiety  3.   Continue trazodone 150 mg at bedtime for sleep maintenance/insomnia  4.   Continue prazosin 5 mg as needed at bedtime to prevent nightmares  5.   Continue propranolol 40 mg as needed for breakthrough anxiety  6.   Continue individual psychotherapy  7.   Follow-up with Dr. Mason in 3 months, or sooner if needed    Review of Systems   Constitutional, HEENT, cardiovascular, pulmonary, gi and gu systems are negative, except as otherwise noted.      Objective    BP (!) 146/88 (BP Location: Right arm, Patient Position: Sitting, Cuff Size: Adult Regular)   Pulse 100   Temp 97.9  F (36.6  C) (Temporal)   Resp 14   Wt 114.9 kg (253 lb 3.2 oz)   SpO2 98%   BMI 35.31 kg/m    Body mass index is 35.31 kg/m .  Physical Exam   GENERAL: healthy, alert and no distress  EYES: Eyes grossly normal to inspection, PERRL and conjunctivae and sclerae normal  RESP: lungs clear to auscultation - no rales, rhonchi or wheezes  CV: regular rates and rhythm and normal S1 S2, no S3 or S4  ABDOMEN: soft, nontender and bowel sounds normal  MS: no gross musculoskeletal defects noted, no edema  SKIN: bluish tinge on both cheeks, forehead, nose  PSYCH: mentation appears normal, affect normal/bright and speech pressured      Results for orders placed or performed in visit on 01/18/23   Comprehensive metabolic panel     Status: Abnormal   Result Value Ref Range    Sodium 135 (L) 136 - 145 mmol/L    Potassium 4.3 3.4 - 5.3 mmol/L    Chloride 98 98 - 107 mmol/L    Carbon Dioxide (CO2) 27 22 - 29 mmol/L    Anion Gap 10 7 - 15 mmol/L    Urea Nitrogen 7.4 (L) 8.0 - 23.0 mg/dL    Creatinine 0.79 0.67 - 1.17 mg/dL    Calcium 9.3 8.6 - 10.0 mg/dL    Glucose 112 (H) 70 - 99 mg/dL     Alkaline Phosphatase 96 40 - 129 U/L    AST 32 10 - 50 U/L    ALT 38 10 - 50 U/L    Protein Total 7.2 6.4 - 8.3 g/dL    Albumin 4.3 3.5 - 5.2 g/dL    Bilirubin Total 0.8 <=1.2 mg/dL    GFR Estimate >90 >60 mL/min/1.73m2   Magnesium     Status: Normal   Result Value Ref Range    Magnesium 2.2 1.7 - 2.3 mg/dL   Lipid Panel     Status: Normal   Result Value Ref Range    Cholesterol 140 <200 mg/dL    Triglycerides 89 <150 mg/dL    Direct Measure HDL 54 >=40 mg/dL    LDL Cholesterol Calculated 68 <=100 mg/dL    Non HDL Cholesterol 86 <130 mg/dL    Narrative    Cholesterol  Desirable:  <200 mg/dL    Triglycerides  Normal:  Less than 150 mg/dL  Borderline High:  150-199 mg/dL  High:  200-499 mg/dL  Very High:  Greater than or equal to 500 mg/dL    Direct Measure HDL  Female:  Greater than or equal to 50 mg/dL   Male:  Greater than or equal to 40 mg/dL    LDL Cholesterol  Desirable:  <100mg/dL  Above Desirable:  100-129 mg/dL   Borderline High:  130-159 mg/dL   High:  160-189 mg/dL   Very High:  >= 190 mg/dL    Non HDL Cholesterol  Desirable:  130 mg/dL  Above Desirable:  130-159 mg/dL  Borderline High:  160-189 mg/dL  High:  190-219 mg/dL  Very High:  Greater than or equal to 220 mg/dL   Hemoglobin A1c     Status: Normal   Result Value Ref Range    Hemoglobin A1C 5.5 4.0 - 6.2 %   TSH Reflex GH     Status: Normal   Result Value Ref Range    TSH 1.29 0.30 - 4.20 uIU/mL   Vitamin B12     Status: Normal   Result Value Ref Range    Vitamin B12 524 232 - 1,245 pg/mL   Vitamin D Total     Status: Abnormal   Result Value Ref Range    Vitamin D, Total (25-Hydroxy) 12 (L) 20 - 75 ug/L    Narrative    Season, race, dietary intake, and treatment affect the concentration of 25-hydroxy-Vitamin D. Values may decrease during winter months and increase during summer months. Values 20-29 ug/L may indicate Vitamin D insufficiency and values <20 ug/L may indicate Vitamin D deficiency.    Vitamin D determination is routinely performed by an  immunoassay specific for 25 hydroxyvitamin D3.  If an individual is on vitamin D2(ergocalciferol) supplementation, please specify 25 OH vitamin D2 and D3 level determination by LCMSMS test VITD23.     CBC with platelets and differential     Status: Abnormal   Result Value Ref Range    WBC Count 10.2 4.0 - 11.0 10e3/uL    RBC Count 5.66 4.40 - 5.90 10e6/uL    Hemoglobin 20.0 (H) 13.3 - 17.7 g/dL    Hematocrit 57.0 (H) 40.0 - 53.0 %     (H) 78 - 100 fL    MCH 35.3 (H) 26.5 - 33.0 pg    MCHC 35.1 31.5 - 36.5 g/dL    RDW 13.1 10.0 - 15.0 %    Platelet Count 193 150 - 450 10e3/uL    % Neutrophils 62 %    % Lymphocytes 26 %    % Monocytes 9 %    % Eosinophils 2 %    % Basophils 1 %    % Immature Granulocytes 0 %    NRBCs per 100 WBC 0 <1 /100    Absolute Neutrophils 6.5 1.6 - 8.3 10e3/uL    Absolute Lymphocytes 2.6 0.8 - 5.3 10e3/uL    Absolute Monocytes 0.9 0.0 - 1.3 10e3/uL    Absolute Eosinophils 0.2 0.0 - 0.7 10e3/uL    Absolute Basophils 0.1 0.0 - 0.2 10e3/uL    Absolute Immature Granulocytes 0.0 <=0.4 10e3/uL    Absolute NRBCs 0.0 10e3/uL   EKG 12-lead, tracing only     Status: None   Result Value Ref Range    Systolic Blood Pressure  mmHg    Diastolic Blood Pressure  mmHg    Ventricular Rate 91 BPM    Atrial Rate 91 BPM    FL Interval 162 ms    QRS Duration 84 ms     ms    QTc 455 ms    P Axis 65 degrees    R AXIS 31 degrees    T Axis 64 degrees    Interpretation ECG       Sinus rhythm  Normal ECG  No previous ECGs available  Confirmed by Shaw Ruiz ([id number]) on 1/19/2023 10:17:58 AM     CBC and Differential     Status: Abnormal    Narrative    The following orders were created for panel order CBC and Differential.  Procedure                               Abnormality         Status                     ---------                               -----------         ------                     CBC with platelets and d...[[id number]]  Abnormal            Final result                 Please view results for  these tests on the individual orders.             Answers for HPI/ROS submitted by the patient on 1/18/2023  If you checked off any problems, how difficult have these problems made it for you to do your work, take care of things at home, or get along with other people?: Somewhat difficult  PHQ9 TOTAL SCORE: 5  How many servings of fruits and vegetables do you eat daily?: 0-1  On average, how many sweetened beverages do you drink each day (Examples: soda, juice, sweet tea, etc.  Do NOT count diet or artificially sweetened beverages)?: 2  How many minutes a day do you exercise enough to make your heart beat faster?: 9 or less  How many days a week do you exercise enough to make your heart beat faster?: 3 or less  How many days per week do you miss taking your medication?: 0  What is the reason for your visit today?: chest pain  When did your symptoms begin?: More than a month  What are your symptoms?: chestpain  How would you describe these symptoms?: Moderate  Are your symptoms:: Staying the same  Have you had these symptoms before?: No  Is there anything that makes you feel worse?: exerting  Is there anything that makes you feel better?: rest

## 2023-01-19 LAB
ATRIAL RATE - MUSE: 91 BPM
DIASTOLIC BLOOD PRESSURE - MUSE: NORMAL MMHG
INTERPRETATION ECG - MUSE: NORMAL
P AXIS - MUSE: 65 DEGREES
PR INTERVAL - MUSE: 162 MS
QRS DURATION - MUSE: 84 MS
QT - MUSE: 370 MS
QTC - MUSE: 455 MS
R AXIS - MUSE: 31 DEGREES
SYSTOLIC BLOOD PRESSURE - MUSE: NORMAL MMHG
T AXIS - MUSE: 64 DEGREES
VENTRICULAR RATE- MUSE: 91 BPM

## 2023-01-19 NOTE — RESULT ENCOUNTER NOTE
Please call the patient with the results.     His hemoglobin is high - caused from smoking. He needs to stop smoking.    Otherwise his labs are good. Nothing concerning seen other than the elevated hemoglobin.    ANTONIETA Llamas, AGNP-C  Internal Medicine

## 2023-01-19 NOTE — PROGRESS NOTES
Patient advised of the following. Nothing further needed at this time. Beronica Malave, RN on 1/19/2023 at 2:37 PM      Alyse Mcpherson NP  P [facility] Nurse; P  Unit 3 Care Team Rn  Please call the patient with the results.     His hemoglobin is high - caused from smoking. He needs to stop smoking.     Otherwise his labs are good. Nothing concerning seen other than the elevated hemoglobin.       ANTONIETA Llamas, AGNP-C   Internal Medicine

## 2023-01-20 DIAGNOSIS — E55.9 VITAMIN D DEFICIENCY: Primary | ICD-10-CM

## 2023-01-20 LAB — DEPRECATED CALCIDIOL+CALCIFEROL SERPL-MC: 12 UG/L (ref 20–75)

## 2023-01-20 RX ORDER — CHOLECALCIFEROL (VITAMIN D3) 50 MCG
1 TABLET ORAL DAILY
Qty: 90 TABLET | Refills: 4 | Status: SHIPPED | OUTPATIENT
Start: 2023-01-20

## 2023-01-20 RX ORDER — ERGOCALCIFEROL 1.25 MG/1
50000 CAPSULE, LIQUID FILLED ORAL WEEKLY
Qty: 12 CAPSULE | Refills: 0 | Status: SHIPPED | OUTPATIENT
Start: 2023-01-20 | End: 2023-04-08

## 2023-01-20 NOTE — RESULT ENCOUNTER NOTE
Please call the patient with the results.  His vitamin D level is severely low.  I have gone ahead and sent in 2 prescriptions, 1 for weekly treatment of 50,000 units and the other for daily treatment of 2000 units.  Plan to recheck in 3 months.  ANTONIETA Llamas, AGNP-C  Internal Medicine

## 2023-01-23 PROBLEM — E66.01 MORBID OBESITY (H): Status: ACTIVE | Noted: 2023-01-23

## 2023-01-30 ENCOUNTER — TELEPHONE (OUTPATIENT)
Dept: CARDIOLOGY | Facility: OTHER | Age: 58
End: 2023-01-30
Payer: MEDICAID

## 2023-02-01 ENCOUNTER — HOSPITAL ENCOUNTER (OUTPATIENT)
Dept: CARDIOLOGY | Facility: OTHER | Age: 58
Discharge: HOME | End: 2023-02-01
Attending: NURSE PRACTITIONER | Admitting: NURSE PRACTITIONER
Payer: MEDICAID

## 2023-02-01 VITALS
OXYGEN SATURATION: 96 % | DIASTOLIC BLOOD PRESSURE: 120 MMHG | HEART RATE: 100 BPM | RESPIRATION RATE: 18 BRPM | SYSTOLIC BLOOD PRESSURE: 200 MMHG

## 2023-02-01 DIAGNOSIS — R07.89 ATYPICAL CHEST PAIN: ICD-10-CM

## 2023-02-01 PROCEDURE — 255N000002 HC RX 255 OP 636: Performed by: NURSE PRACTITIONER

## 2023-02-01 PROCEDURE — 93325 DOPPLER ECHO COLOR FLOW MAPG: CPT | Mod: 26 | Performed by: INTERNAL MEDICINE

## 2023-02-01 PROCEDURE — 999N000208 ECHO STRESS ECHOCARDIOGRAM

## 2023-02-01 PROCEDURE — 93321 DOPPLER ECHO F-UP/LMTD STD: CPT | Mod: 26 | Performed by: INTERNAL MEDICINE

## 2023-02-01 PROCEDURE — 93017 CV STRESS TEST TRACING ONLY: CPT

## 2023-02-01 PROCEDURE — C8928 TTE W OR W/O FOL W/CON,STRES: HCPCS

## 2023-02-01 PROCEDURE — 93018 CV STRESS TEST I&R ONLY: CPT | Performed by: STUDENT IN AN ORGANIZED HEALTH CARE EDUCATION/TRAINING PROGRAM

## 2023-02-01 PROCEDURE — 93016 CV STRESS TEST SUPVJ ONLY: CPT | Performed by: STUDENT IN AN ORGANIZED HEALTH CARE EDUCATION/TRAINING PROGRAM

## 2023-02-01 PROCEDURE — 93350 STRESS TTE ONLY: CPT | Mod: 26 | Performed by: INTERNAL MEDICINE

## 2023-02-01 RX ADMIN — PERFLUTREN 5 ML: 6.52 INJECTION, SUSPENSION INTRAVENOUS at 15:10

## 2023-02-01 NOTE — PROGRESS NOTES
14:00  The patient arrived for a stress echo.  The procedure, risks and benefits were discussed and the consent was signed.  The patient was prepped for the stress test, and an IV was placed per procedure.  BP initial on arrival at rest with automatic BP was 189/121 right arm; rechecked several minutes later with manual /120 Dr. Ruiz notified by sonographer of elevated BP.  O2 sat 96% with expiratory wheezes, some coarse rhonchi noted bilateral bases.  Patient did bring his inhaler and did use his inhaler earlier today states he is easily winded with any activity.  The echo sonographer did the initial images with definity for image enhancement.    arrived, and the patient biked 7 minutes and 1 seconds.  The patient tolerated the procedure.  Stress images were completed and the IV was removed per procedure.  The patient was released in stable condition.  The patient was instructed that the ordering MD will call with results in one to two days. The patient has an appointment with Dr. Jack tomorrow 2/2/23 at 12:45 and he was given an appointment card for this.  Ending Bp 162/102.    If you have not received your results within 3 days please call the ordering provider.  Please see the chart for complete test results.

## 2023-02-01 NOTE — LETTER
February 2, 2023      Lazaro Man  [address] [address]        Dear ,    We are writing to inform you of your test results.  Your stress echo test was normal.    Please work on smoking cessation as we discussed at your clinic visit.  Follow-up as needed.    If you have any questions or concerns, please call the clinic at the number listed above.       Sincerely,      Alyse Mcpherson NP

## 2023-02-02 ENCOUNTER — OFFICE VISIT (OUTPATIENT)
Dept: CARDIOLOGY | Facility: OTHER | Age: 58
End: 2023-02-02
Attending: INTERNAL MEDICINE
Payer: MEDICAID

## 2023-02-02 VITALS
HEART RATE: 96 BPM | OXYGEN SATURATION: 98 % | RESPIRATION RATE: 20 BRPM | BODY MASS INDEX: 34 KG/M2 | DIASTOLIC BLOOD PRESSURE: 90 MMHG | WEIGHT: 251 LBS | TEMPERATURE: 97.7 F | HEIGHT: 72 IN | SYSTOLIC BLOOD PRESSURE: 172 MMHG

## 2023-02-02 DIAGNOSIS — G47.33 OSA (OBSTRUCTIVE SLEEP APNEA): ICD-10-CM

## 2023-02-02 DIAGNOSIS — I71.21 ANEURYSM OF ASCENDING AORTA WITHOUT RUPTURE (H): ICD-10-CM

## 2023-02-02 DIAGNOSIS — Z71.6 TOBACCO ABUSE COUNSELING: ICD-10-CM

## 2023-02-02 DIAGNOSIS — E66.01 MORBID OBESITY (H): ICD-10-CM

## 2023-02-02 DIAGNOSIS — R06.09 DOE (DYSPNEA ON EXERTION): ICD-10-CM

## 2023-02-02 DIAGNOSIS — Z87.19 HISTORY OF GASTROINTESTINAL ULCER: ICD-10-CM

## 2023-02-02 DIAGNOSIS — Z72.0 TOBACCO ABUSE: ICD-10-CM

## 2023-02-02 DIAGNOSIS — R73.03 PREDIABETES: ICD-10-CM

## 2023-02-02 DIAGNOSIS — R07.89 ATYPICAL CHEST PAIN: Primary | ICD-10-CM

## 2023-02-02 DIAGNOSIS — I10 BENIGN ESSENTIAL HYPERTENSION: ICD-10-CM

## 2023-02-02 DIAGNOSIS — Z91.89 SEDENTARY LIFESTYLE: ICD-10-CM

## 2023-02-02 DIAGNOSIS — E55.9 VITAMIN D DEFICIENCY: ICD-10-CM

## 2023-02-02 DIAGNOSIS — S22.42XK CLOSED FRACTURE OF MULTIPLE RIBS OF LEFT SIDE WITH NONUNION, SUBSEQUENT ENCOUNTER: ICD-10-CM

## 2023-02-02 LAB
ATRIAL RATE - MUSE: 92 BPM
DIASTOLIC BLOOD PRESSURE - MUSE: NORMAL MMHG
INTERPRETATION ECG - MUSE: NORMAL
P AXIS - MUSE: 67 DEGREES
PR INTERVAL - MUSE: 164 MS
QRS DURATION - MUSE: 84 MS
QT - MUSE: 366 MS
QTC - MUSE: 452 MS
R AXIS - MUSE: 35 DEGREES
SYSTOLIC BLOOD PRESSURE - MUSE: NORMAL MMHG
T AXIS - MUSE: 68 DEGREES
VENTRICULAR RATE- MUSE: 92 BPM

## 2023-02-02 PROCEDURE — 93010 ELECTROCARDIOGRAM REPORT: CPT | Performed by: INTERNAL MEDICINE

## 2023-02-02 PROCEDURE — 99205 OFFICE O/P NEW HI 60 MIN: CPT | Performed by: INTERNAL MEDICINE

## 2023-02-02 PROCEDURE — G0463 HOSPITAL OUTPT CLINIC VISIT: HCPCS

## 2023-02-02 PROCEDURE — 93005 ELECTROCARDIOGRAM TRACING: CPT | Performed by: INTERNAL MEDICINE

## 2023-02-02 PROCEDURE — G0463 HOSPITAL OUTPT CLINIC VISIT: HCPCS | Mod: 25

## 2023-02-02 RX ORDER — HYDROCHLOROTHIAZIDE 25 MG/1
25 TABLET ORAL DAILY
Qty: 90 TABLET | Refills: 3 | Status: SHIPPED | OUTPATIENT
Start: 2023-02-02 | End: 2024-02-13

## 2023-02-02 ASSESSMENT — ANXIETY QUESTIONNAIRES
1. FEELING NERVOUS, ANXIOUS, OR ON EDGE: NOT AT ALL
2. NOT BEING ABLE TO STOP OR CONTROL WORRYING: NOT AT ALL
IF YOU CHECKED OFF ANY PROBLEMS ON THIS QUESTIONNAIRE, HOW DIFFICULT HAVE THESE PROBLEMS MADE IT FOR YOU TO DO YOUR WORK, TAKE CARE OF THINGS AT HOME, OR GET ALONG WITH OTHER PEOPLE: NOT DIFFICULT AT ALL
GAD7 TOTAL SCORE: 0
6. BECOMING EASILY ANNOYED OR IRRITABLE: NOT AT ALL
5. BEING SO RESTLESS THAT IT IS HARD TO SIT STILL: NOT AT ALL
7. FEELING AFRAID AS IF SOMETHING AWFUL MIGHT HAPPEN: NOT AT ALL
GAD7 TOTAL SCORE: 0
3. WORRYING TOO MUCH ABOUT DIFFERENT THINGS: NOT AT ALL

## 2023-02-02 ASSESSMENT — PAIN SCALES - GENERAL: PAINLEVEL: MILD PAIN (3)

## 2023-02-02 ASSESSMENT — PATIENT HEALTH QUESTIONNAIRE - PHQ9
SUM OF ALL RESPONSES TO PHQ QUESTIONS 1-9: 0
5. POOR APPETITE OR OVEREATING: NOT AT ALL

## 2023-02-02 NOTE — PROGRESS NOTES
"[facility] HEART CARE   CARDIOLOGY CONSULT     Lazaro Mna   1965  [id number]    Franca Perez     Chief Complaint   Patient presents with     Consult     Atypical chest pain          HPI:   Mr. Willis is a 57-year-old gentleman who is being seen by cardiology in follow-up to his exercise stress echo he had an episode of rather severe chest pain on 11/27/2022 which slowly dissipated over the next few days.  He has not had a recurrence.  He states he has not been caring for himself like he should and concerned that his symptoms may have been related to his heart.  He was set up for stress echo which was normal and is here for the results.    Lazaro states she was pulling a trailer out of the ditch on 11/27/2022.  He exerted himself rather aggressively.  This resulted in severe chest discomfort.  He became severely dyspneic to the point where he could hardly catch his breath.  He walked 20 yards to get his inhaler which seemed to help with symptoms.  After using his inhaler, he describes shaking profusely much like having \"seizure\".  He has not had seizures or history of seizures.  In the ensuing days, his chest discomfort persisted but progressively improved.  He has not had a reoccurrence since.  He openly admits he has been sedentary.  He gives an example of walking [place] to the post office which is around 10-12 steps and this causes him to be severely dyspneic but denies chest pain or chest tightness.  He states that in his younger years, he was very active playing hockey and other sports.  In 2009, he required surgery which resulted in him discontinuing these activities.  He tried to resume his activities a year later in 2010 and tore the mesh that was placed.  Since that time, he has been very sedentary.  He has been minimally active and continues to smoke.  He is aware that he eats poorly and does not do the things he should to maintain his health..  He has kids who live in [place] and he would like to see " them in the spring.  His kids have encouraged him to be checked out.    He went on to have a stress echo based on his symptoms.  He is here for those results.  His stress echo was normal 2/1/2023 and this was explained.  He was noted to have a mild ascending aortic aneurysm of 4.1 cm.  This aneurysm was explained.  He was told he should have an echocardiogram every couple years and follow-up to his ascending aortic aneurysm.    Thankfully, he has not ongoing chest discomfort.  He continues to smoke at a quarter pack a day and is prediabetic.  He is morbidly obese.  He was recently given an inhaler as he would wake up at night gasping for air concerning for sleep apnea.  This inhaler seem to help.  He does not have a history of asthma.  He does smoke with dyspnea on exertion concerning for COPD.  I suggested PFT's and chest x-ray which he declined.  He is going to try to improve his lifestyle.      IMAGING RESULTS:   This stress echo on 2/1/2023.   Normal, low-risk exercise echocardiogram with slightly reduced sensitivity due to slightly submaximal HR (81% MPHR.)  The target heart rate was not achieved. Exercise was terminated at a slightly  submaximal HR of 130 (81% MPHR) due to dyspnea. Normal heart rate response to exercise. Baseline hypertension with hypertensive BP response to exercise.  Normal biventricular size, and global systolic function at baseline, LVEF=60-  65%.  With exercise, LVEF increased to >65% and LV cavity size decreased appropriately.  No regional wall motion abnormalities are present at rest or with exercise.  Exercise ECG portion of the test (including functional capacity) is reported  separately.  No significant valvular abnormalities are noted on screening Doppler exam.  Borderline-dilated aortic root and proximal ascending aorta when indexed to body surface area, measuring 4.1 cm (1.8 cm/m2) and 4.1 cm (1.8 cm/m2) respectively.    CURRENT MEDICATIONS:   Prior to Admission medications     Medication Sig Start Date End Date Taking? Authorizing Provider   albuterol (PROAIR HFA/PROVENTIL HFA/VENTOLIN HFA) 108 (90 Base) MCG/ACT inhaler INHALE 1 TO 2 PUFFS INTO THE LUNGS EVERY 4 HOURS AS NEEDED FOR SHORTNESS OF BREATH, DIFFICULT BREATHING OR WHEEZING 8/26/22   Alyse Mcpherson NP   buPROPion (WELLBUTRIN XL) 300 MG 24 hr tablet Take 1 tablet (300 mg) by mouth every morning 1/18/23   Alyse Mcpherson NP   sertraline (ZOLOFT) 100 MG tablet Take 1 tablet (100 mg) by mouth daily 1/18/23   Alyse Mcpherson NP   vitamin D2 (ERGOCALCIFEROL) 44908 units (1250 mcg) capsule Take 1 capsule (50,000 Units) by mouth once a week for 12 doses 1/20/23 4/8/23  Alyse Mcpherson NP   vitamin D3 (CHOLECALCIFEROL) 50 mcg (2000 units) tablet Take 1 tablet (50 mcg) by mouth daily 1/20/23   Alyse Mcpherson NP       ALLERGIES:   No Known Allergies     PAST MEDICAL HISTORY:   Past Medical History:   Diagnosis Date     Chronic or unspecified gastric ulcer with perforation (H)     7/11/09     Closed displaced fracture of shaft of third metacarpal bone of left hand     11/29/2016     Closed fracture of proximal end of right humerus with routine healing 1/11/2018     Closed nondisplaced fracture of proximal phalanx of left little finger     12/13/2016     Fracture of upper end of right humerus with routine healing     1/11/2018     Gastroduodenitis 7/29/2019     Hypertension 6/20/2017        PAST SURGICAL HISTORY:   Past Surgical History:   Procedure Laterality Date     COLONOSCOPY  04/04/2016 4/4/16,F/U 2021  5 years     COLONOSCOPY N/A 7/29/2019    Failed poor prep     COLONOSCOPY N/A 8/26/2019    F/U 2022 Advanced serrated adenoma     ESOPHAGOSCOPY, GASTROSCOPY, DUODENOSCOPY (EGD), COMBINED N/A 7/29/2019    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     OTHER SURGICAL HISTORY      07/11/09,93404.0,IN REPAIR PERF DUOD RUBY ULC/WND/INJURY,Repair of perforated posterior lesser curve gastric ulcer     OTHER SURGICAL  HISTORY      01/31/2012,,HERNIA REPAIR,with mesh underlay        FAMILY HISTORY:   Family History   Problem Relation Age of Onset     Colon Cancer Mother         Cancer-colon     Colon Cancer Other         Cancer-colon        SOCIAL HISTORY:   Social History     Socioeconomic History     Marital status: Single   Tobacco Use     Smoking status: Light Smoker     Packs/day: 0.25     Types: Cigarettes     Smokeless tobacco: Never     Tobacco comments:     Trying too quit; only a couple a day, but wants to quit - cant take patch   Vaping Use     Vaping Use: Never used   Substance and Sexual Activity     Alcohol use: Not Currently     Alcohol/week: 12.0 standard drinks     Comment: Alcoholic Drinks/day: rare     Drug use: Never     Sexual activity: Yes     Partners: Female   Social History Narrative    Currently not working. Trying to get disability for GI issues.          ROS:   CONSTITUTIONAL: No weight loss, fever, chills, weakness or fatigue.   HEENT: Eyes: No visual changes. Ears, Nose, Throat: No hearing loss, congestion or difficulty swallowing.   CARDIOVASCULAR: Admits to chest pain with chest pressure and chest discomfort. No palpitations or lower extremity edema.   RESPIRATORY: Significant shortness of breath with dyspnea upon exertion, no cough or sputum production.   GASTROINTESTINAL: No abdominal pain. No anorexia, nausea, vomiting or diarrhea.   NEUROLOGICAL: No headache, lightheadedness, dizziness, syncope, ataxia or weakness.   HEMATOLOGIC: No anemia, bleeding or bruising.   PSYCHIATRIC: No history of depression or anxiety.   ENDOCRINOLOGIC: No reports of sweating, cold or heat intolerance. No polyuria or polydipsia.   SKIN: No abnormal rashes or itching.       PHYSICAL EXAM:   GENERAL: The patient is a well-developed, well-nourished, in no apparent distress. Alert and oriented x3.   HEENT: Head is normocephalic and atraumatic. Eyes are symmetrical with normal visual tracking.  HEART: Regular rate and  rhythm, S1S2 present without murmur, rub or gallop.   LUNGS: Respirations regular and unlabored. Clear to auscultation.   EXTREMITIES: No peripheral edema present.   NEUROLOGIC: Alert and oriented X3.    SKIN: No jaundice. No rashes or visible skin lesions present.        LAB RESULTS:   Office Visit on 01/18/2023   Component Date Value Ref Range Status     Ventricular Rate 01/18/2023 91  BPM Final     Atrial Rate 01/18/2023 91  BPM Final     ND Interval 01/18/2023 162  ms Final     QRS Duration 01/18/2023 84  ms Final     QT 01/18/2023 370  ms Final     QTc 01/18/2023 455  ms Final     P Axis 01/18/2023 65  degrees Final     R AXIS 01/18/2023 31  degrees Final     T Axis 01/18/2023 64  degrees Final     Interpretation ECG 01/18/2023    Final                    Value:Sinus rhythm  Normal ECG  No previous ECGs available  Confirmed by Shaw Ruiz ([id number]) on 1/19/2023 10:17:58 AM       Sodium 01/18/2023 135 (L)  136 - 145 mmol/L Final     Potassium 01/18/2023 4.3  3.4 - 5.3 mmol/L Final     Chloride 01/18/2023 98  98 - 107 mmol/L Final     Carbon Dioxide (CO2) 01/18/2023 27  22 - 29 mmol/L Final     Anion Gap 01/18/2023 10  7 - 15 mmol/L Final     Urea Nitrogen 01/18/2023 7.4 (L)  8.0 - 23.0 mg/dL Final     Creatinine 01/18/2023 0.79  0.67 - 1.17 mg/dL Final     Calcium 01/18/2023 9.3  8.6 - 10.0 mg/dL Final     Glucose 01/18/2023 112 (H)  70 - 99 mg/dL Final     Alkaline Phosphatase 01/18/2023 96  40 - 129 U/L Final     AST 01/18/2023 32  10 - 50 U/L Final     ALT 01/18/2023 38  10 - 50 U/L Final     Protein Total 01/18/2023 7.2  6.4 - 8.3 g/dL Final     Albumin 01/18/2023 4.3  3.5 - 5.2 g/dL Final     Bilirubin Total 01/18/2023 0.8  <=1.2 mg/dL Final     GFR Estimate 01/18/2023 >90  >60 mL/min/1.73m2 Final     Magnesium 01/18/2023 2.2  1.7 - 2.3 mg/dL Final     Cholesterol 01/18/2023 140  <200 mg/dL Final     Triglycerides 01/18/2023 89  <150 mg/dL Final     Direct Measure HDL 01/18/2023 54  >=40 mg/dL Final     LDL  Cholesterol Calculated 01/18/2023 68  <=100 mg/dL Final     Non HDL Cholesterol 01/18/2023 86  <130 mg/dL Final     Hemoglobin A1C 01/18/2023 5.5  4.0 - 6.2 % Final     TSH 01/18/2023 1.29  0.30 - 4.20 uIU/mL Final     Vitamin B12 01/18/2023 524  232 - 1,245 pg/mL Final     Vitamin D, Total (25-Hydroxy) 01/18/2023 12 (L)  20 - 75 ug/L Final     WBC Count 01/18/2023 10.2  4.0 - 11.0 10e3/uL Final     RBC Count 01/18/2023 5.66  4.40 - 5.90 10e6/uL Final     Hemoglobin 01/18/2023 20.0 (H)  13.3 - 17.7 g/dL Final     Hematocrit 01/18/2023 57.0 (H)  40.0 - 53.0 % Final     MCV 01/18/2023 101 (H)  78 - 100 fL Final     MCH 01/18/2023 35.3 (H)  26.5 - 33.0 pg Final     MCHC 01/18/2023 35.1  31.5 - 36.5 g/dL Final     RDW 01/18/2023 13.1  10.0 - 15.0 % Final     Platelet Count 01/18/2023 193  150 - 450 10e3/uL Final     % Neutrophils 01/18/2023 62  % Final     % Lymphocytes 01/18/2023 26  % Final     % Monocytes 01/18/2023 9  % Final     % Eosinophils 01/18/2023 2  % Final     % Basophils 01/18/2023 1  % Final     % Immature Granulocytes 01/18/2023 0  % Final     NRBCs per 100 WBC 01/18/2023 0  <1 /100 Final     Absolute Neutrophils 01/18/2023 6.5  1.6 - 8.3 10e3/uL Final     Absolute Lymphocytes 01/18/2023 2.6  0.8 - 5.3 10e3/uL Final     Absolute Monocytes 01/18/2023 0.9  0.0 - 1.3 10e3/uL Final     Absolute Eosinophils 01/18/2023 0.2  0.0 - 0.7 10e3/uL Final     Absolute Basophils 01/18/2023 0.1  0.0 - 0.2 10e3/uL Final     Absolute Immature Granulocytes 01/18/2023 0.0  <=0.4 10e3/uL Final     Absolute NRBCs 01/18/2023 0.0  10e3/uL Final          ASSESSMENT:       ICD-10-CM    1. Atypical chest pain  R07.89 Adult Cardiology Eval  Referral     EKG 12-lead, tracing only      2. Benign essential hypertension  I10 hydrochlorothiazide (HYDRODIURIL) 25 MG tablet      3. Morbid obesity (H)  E66.01       4. Aneurysm of ascending aorta without rupture  I71.21       5. Closed fracture of multiple ribs of left side with  nonunion, subsequent encounter on 7/31/2017  S22.42XK       6. RODRIGUEZ (dyspnea on exertion)  R06.09       7. CLARA (obstructive sleep apnea)  G47.33       8. Sedentary lifestyle  Z91.89       9. Tobacco abuse  Z72.0       10. Tobacco abuse counseling  Z71.6       11. Prediabetes  R73.03       12. Vitamin D deficiency  E55.9       13. History of gastrointestinal ulcer  Z87.19             PLAN:   1.  Chest discomfort: He had a normal stress echo on 2/1/2023.  Unlikely to be cardiac related.  I wonder if his chest discomfort is from an unhealed rib fracture involving his ninth rib on the left.  He had a CT scan on 7/31/2017 that showed periosteal bone of the posterior ninth rib fracture which had not healed.  There was no bone bridging.  It was thought that this could still be painful.  These findings were discussed.  Suggested lifestyle changes.  2.  Concern for CLARA: Patient states he used to wake up at night gasping for air.  Was given an inhaler which seemed to help some.  He denies a history of asthma.  Suggested he see sleep medicine as I suspect he has sleep apnea.  Patient declined.  3.  RODRIGUEZ: I suspect he has COPD.  He continues to smoke.  Suggested a chest x-ray as well as PFT's but these were declined.  4.  Tobacco abuse: Ongoing.  Encouraged to quit.  5.  Vitamin D deficiency: At 12 on 1/18/2023.  Been given vitamin D by his primary.  6.  Morbid obesity/sedentary lifestyle: Have encouraged to improve his diet and increase his activity.  7.  Rib fractures: On the left with the ninth rib which was unhealed on 7/31/2017.  Findings discussed.  8.  We will follow-up in the future on an as-needed basis.    Total time spent on day of visit, including review of tests, obtaining/reviewing separately obtained history, ordering medications/tests/procedures, communicating with PCP/consultants, and documenting in electronic medical record: 60 minutes.         Thank you for allowing me to participate in the care of your  patient. Please do not hesitate to contact me if you have any questions.     Moi Jack, DO

## 2023-02-02 NOTE — NURSING NOTE
"Patient presents to the clinic for cardiac consult.  Does not take ASA or statin.    FOOD SECURITY SCREENING QUESTIONS:    The next two questions are to help us understand your food security.  If you are feeling you need any assistance in this area, we have resources available to support you today.    Hunger Vital Signs:  Within the past 12 months we worried whether our food would run out before we got money to buy more. Never  Within the past 12 months the food we bought just didn't last and we didn't have money to get more. Never    Advance Care Directive on file? no  Advance Care Directive provided to patient? Declined.    Chief Complaint   Patient presents with     Consult     Atypical chest pain       Initial BP (!) 172/90 (BP Location: Right arm, Patient Position: Sitting, Cuff Size: Adult Large)   Pulse 96   Temp 97.7  F (36.5  C) (Tympanic)   Resp 20   Ht 1.816 m (5' 11.5\")   Wt 113.9 kg (251 lb)   SpO2 98%   BMI 34.52 kg/m   Estimated body mass index is 34.52 kg/m  as calculated from the following:    Height as of this encounter: 1.816 m (5' 11.5\").    Weight as of this encounter: 113.9 kg (251 lb).  Medication Reconciliation: complete        Lorenza James LPN      "

## 2023-04-14 ENCOUNTER — TELEPHONE (OUTPATIENT)
Dept: PSYCHIATRY | Facility: OTHER | Age: 58
End: 2023-04-14
Payer: MEDICAID

## 2023-04-14 NOTE — TELEPHONE ENCOUNTER
This client needs 2 antidepressants prescriptions renewed.    Rufina Flynn on 4/14/2023 at 2:28 PM

## 2023-05-16 DIAGNOSIS — E55.9 VITAMIN D DEFICIENCY: ICD-10-CM

## 2023-05-16 DIAGNOSIS — R06.02 SHORTNESS OF BREATH: ICD-10-CM

## 2023-05-17 RX ORDER — ALBUTEROL SULFATE 90 UG/1
AEROSOL, METERED RESPIRATORY (INHALATION)
Qty: 18 G | Refills: 4 | Status: SHIPPED | OUTPATIENT
Start: 2023-05-17 | End: 2024-06-06

## 2023-05-17 RX ORDER — ERGOCALCIFEROL 1.25 MG/1
CAPSULE, LIQUID FILLED ORAL
Qty: 12 CAPSULE | Refills: 0 | OUTPATIENT
Start: 2023-05-17

## 2023-05-17 NOTE — TELEPHONE ENCOUNTER
[facility] sent Rx request for the following:    vitamin D2 (ERGOCALCIFEROL) 67301 units (1250 mcg) capsule 12 capsule 0 1/20/2023 4/8/2023 --   Sig - Route: Take 1 capsule (50,000 Units) by mouth once a week for 12 doses - Oral     No longer on active medication list.     vitamin D3 (CHOLECALCIFEROL) 50 mcg (2000 units) tablet 90 tablet 4 1/20/2023  --   Sig - Route: Take 1 tablet (50 mcg) by mouth daily - Oral       Alyse Mcpherson NP   1/20/2023  2:48 PM CST       Please call the patient with the results.  His vitamin D level is severely low.  I have gone ahead and sent in 2 prescriptions, 1 for weekly treatment of 50,000 units and the other for daily treatment of 2000 units.  Plan to recheck in 3 months.  ANTONIETA Llamas, AGNP-C  Internal Medicine     Called and spoke to Patient after verifying last name and date of birth. Pt states higher dose is complete. Ok to disregard. Pt does need refill of the following:    Requested Prescriptions   Pending Prescriptions Disp Refills     albuterol (PROAIR HFA/PROVENTIL HFA/VENTOLIN HFA) 108 (90 Base) MCG/ACT inhaler 18 g 4   Last Prescription Date:   8/26/22  Last Fill Qty/Refills:         18 g, R-4    Last Office Visit:              1/18/23   Future Office visit:           None    Patient requested message be sent to Alyse Mcpherson.    Barbara De Jesus RN .............. 5/17/2023  3:18 PM

## 2023-07-12 NOTE — ANESTHESIA PREPROCEDURE EVALUATION
Anesthesia Pre-Procedure Evaluation    Patient: Lazaro Beltranoutsherif   MRN: [id number] : 1965          Preoperative Diagnosis: blood in stool    Procedure(s):  COLONOSCOPY    Past Medical History:   Diagnosis Date     Chronic or unspecified gastric ulcer with perforation (H)     09     Closed displaced fracture of shaft of third metacarpal bone of left hand     2016     Closed nondisplaced fracture of proximal phalanx of left little finger     2016     Fracture of upper end of right humerus with routine healing     2018     Past Surgical History:   Procedure Laterality Date     COLONOSCOPY  2016,F/U   5 years     COLONOSCOPY N/A 2019    Failed poor prep     ESOPHAGOSCOPY, GASTROSCOPY, DUODENOSCOPY (EGD), COMBINED N/A 2019    F/U  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     OTHER SURGICAL HISTORY      09,36171.0,MS REPAIR PERF DUOD RUBY ULC/WND/INJURY,Repair of perforated posterior lesser curve gastric ulcer     OTHER SURGICAL HISTORY      2012,,HERNIA REPAIR,with mesh underlay       Anesthesia Evaluation     . Pt has had prior anesthetic.            ROS/MED HX    ENT/Pulmonary:  - neg pulmonary ROS     Neurologic:  - neg neurologic ROS     Cardiovascular:     (+) hypertension----. : . . . :. .       METS/Exercise Tolerance:     Hematologic:  - neg hematologic  ROS       Musculoskeletal:  - neg musculoskeletal ROS       GI/Hepatic:  - neg GI/hepatic ROS       Renal/Genitourinary:  - ROS Renal section negative       Endo:  - neg endo ROS       Psychiatric:     (+) psychiatric history depression and anxiety      Infectious Disease:  - neg infectious disease ROS       Malignancy:      - no malignancy   Other:    - neg other ROS                      Physical Exam  Normal systems: cardiovascular, pulmonary and dental    Airway   Mallampati: II  TM distance: >3 FB  Neck ROM: full    Dental     Cardiovascular   Rhythm and rate: regular and normal      Pulmonary     breath sounds clear to auscultation            Lab Results   Component Value Date    WBC 10.0 08/31/2018    HGB 16.8 08/31/2018    HCT 48.1 08/31/2018     08/31/2018     08/31/2018    POTASSIUM 4.2 08/31/2018    CHLORIDE 104 08/31/2018    CO2 29 08/31/2018    BUN 10 08/31/2018    CR 0.94 08/31/2018     (H) 08/31/2018    FAUSTO 9.7 08/31/2018    ALBUMIN 4.2 08/31/2018    PROTTOTAL 7.0 08/31/2018    ALT 18 08/31/2018    AST 17 08/31/2018    ALKPHOS 83 08/31/2018    BILITOTAL 0.3 08/31/2018       Preop Vitals  BP Readings from Last 3 Encounters:   08/26/19 124/88   07/29/19 (!) 147/98   06/07/19 138/88    Pulse Readings from Last 3 Encounters:   07/29/19 76   06/07/19 94   09/06/18 92      Resp Readings from Last 3 Encounters:   08/26/19 16   07/29/19 16   06/07/19 18    SpO2 Readings from Last 3 Encounters:   08/26/19 96%   07/29/19 96%   06/07/19 98%      Temp Readings from Last 1 Encounters:   08/26/19 97  F (36.1  C) (Tympanic)    Ht Readings from Last 1 Encounters:   07/29/19 1.829 m (6')      Wt Readings from Last 1 Encounters:   07/29/19 99.8 kg (220 lb)    Estimated body mass index is 29.84 kg/m  as calculated from the following:    Height as of 7/29/19: 1.829 m (6').    Weight as of 7/29/19: 99.8 kg (220 lb).       Anesthesia Plan      History & Physical Review      ASA Status:  2 .    NPO Status:  > 8 hours    Plan for MAC          Postoperative Care      Consents  Anesthetic plan, risks, benefits and alternatives discussed with:  Patient.  Use of blood products discussed: No .   .                 ANTONIETA Euceda CRNA   Dapsone Pregnancy And Lactation Text: This medication is Pregnancy Category C and is not considered safe during pregnancy or breast feeding.

## 2023-07-19 ENCOUNTER — OFFICE VISIT (OUTPATIENT)
Dept: PSYCHIATRY | Facility: OTHER | Age: 58
End: 2023-07-19
Attending: PSYCHIATRY & NEUROLOGY
Payer: MEDICAID

## 2023-07-19 VITALS
HEART RATE: 100 BPM | OXYGEN SATURATION: 97 % | HEIGHT: 72 IN | DIASTOLIC BLOOD PRESSURE: 131 MMHG | BODY MASS INDEX: 31.9 KG/M2 | RESPIRATION RATE: 18 BRPM | SYSTOLIC BLOOD PRESSURE: 193 MMHG | WEIGHT: 235.5 LBS

## 2023-07-19 DIAGNOSIS — F32.2 SEVERE MAJOR DEPRESSION (H): ICD-10-CM

## 2023-07-19 PROCEDURE — G0463 HOSPITAL OUTPT CLINIC VISIT: HCPCS

## 2023-07-19 PROCEDURE — 99215 OFFICE O/P EST HI 40 MIN: CPT | Performed by: PSYCHIATRY & NEUROLOGY

## 2023-07-19 RX ORDER — BUPROPION HYDROCHLORIDE 300 MG/1
300 TABLET ORAL EVERY MORNING
Qty: 90 TABLET | Refills: 0 | Status: SHIPPED | OUTPATIENT
Start: 2023-07-19 | End: 2023-10-10

## 2023-07-19 RX ORDER — SERTRALINE HYDROCHLORIDE 100 MG/1
100 TABLET, FILM COATED ORAL DAILY
Qty: 90 TABLET | Refills: 0 | Status: SHIPPED | OUTPATIENT
Start: 2023-07-19 | End: 2023-10-10

## 2023-07-19 ASSESSMENT — ANXIETY QUESTIONNAIRES
5. BEING SO RESTLESS THAT IT IS HARD TO SIT STILL: NOT AT ALL
6. BECOMING EASILY ANNOYED OR IRRITABLE: SEVERAL DAYS
2. NOT BEING ABLE TO STOP OR CONTROL WORRYING: SEVERAL DAYS
IF YOU CHECKED OFF ANY PROBLEMS ON THIS QUESTIONNAIRE, HOW DIFFICULT HAVE THESE PROBLEMS MADE IT FOR YOU TO DO YOUR WORK, TAKE CARE OF THINGS AT HOME, OR GET ALONG WITH OTHER PEOPLE: VERY DIFFICULT
GAD7 TOTAL SCORE: 7
4. TROUBLE RELAXING: SEVERAL DAYS
7. FEELING AFRAID AS IF SOMETHING AWFUL MIGHT HAPPEN: SEVERAL DAYS
GAD7 TOTAL SCORE: 7
1. FEELING NERVOUS, ANXIOUS, OR ON EDGE: SEVERAL DAYS
3. WORRYING TOO MUCH ABOUT DIFFERENT THINGS: MORE THAN HALF THE DAYS

## 2023-07-19 ASSESSMENT — PATIENT HEALTH QUESTIONNAIRE - PHQ9
10. IF YOU CHECKED OFF ANY PROBLEMS, HOW DIFFICULT HAVE THESE PROBLEMS MADE IT FOR YOU TO DO YOUR WORK, TAKE CARE OF THINGS AT HOME, OR GET ALONG WITH OTHER PEOPLE: SOMEWHAT DIFFICULT
SUM OF ALL RESPONSES TO PHQ QUESTIONS 1-9: 8
SUM OF ALL RESPONSES TO PHQ QUESTIONS 1-9: 8

## 2023-07-19 ASSESSMENT — PAIN SCALES - GENERAL: PAINLEVEL: MILD PAIN (3)

## 2023-07-19 NOTE — Clinical Note
Dr. Jack, please note blood pressure readings (diastolic over 130 twice today). He refused any further interventions today.

## 2023-07-19 NOTE — PROGRESS NOTES
"Psychiatric Progress Note/Visit  July 19, 2023    Identifying Data:  This is a 58-year-old man seen for follow-up psychiatric medication management visit for treatment of depression, anxiety and insomnia    Interval History:  He was seen most recently on June 29, 2022.  At that time he reported that \"things are calmer than they have been in 10 years\".  He said that he was doing well on his medications.  He stopped using Xanax, but was using propranolol if needed.  He was also only using prazosin as needed (when he has a lot of stress).  We did not make any medication changes.    Today he reports that his medicines are still working well, but he is only taking Zoloft and Wellbutrin.  He is sleeping well without trazodone and is not having nightmares without prazosin.  He also has not been using propranolol.  We had a long discussion about his tendency to overreact to stressors and I encouraged him to be seen immediately today due to his blood pressure readings today.  We also had a long talk about probable trauma reactions and how they continue to affect him today.  I have encouraged him to work with his therapist about strategies to deal with stress going forward.    Mental Status Exam:  Anxiety remains minimal, but it can escalate under stress.  Depression remains minimal to absent.  Remainder of MSE is essentially unchanged from June 29, 2022.    Current Psychiatric Medications:  Wellbutrin  mg a day  Zoloft 100 mg a day    Lab Tests/Results:  Laboratory studies were not ordered previously and none are being ordered today.    Diagnosis:  Major depression, recurrent, severe without psychosis  Anxiety, unspecified  Insomnia, resolved    Impression/Assessment:  Lazaro continues to respond well to the combination of Wellbutrin and Zoloft.  However his anxiety can escalate very quickly, even under fairly mild stress.  I have strongly encouraged him to work with his therapist on this.  I have also encouraged him to " be seen as soon as possible for his blood pressure readings (he admits that he has not been consistent with his medication for blood pressure).  I have also forwarded a copy of today's note to Dr. Jack, cardiologist, to try to get Lazaro seen soon as possible.    Plan:  1.   Continue Zoloft 100 mg a day for depression and anxiety  2.   Continue Wellbutrin  mg a day for depression  3.   Continue individual psychotherapy, especially to work on his reaction to stress.  4.   Follow-up with Dr. Mason in 3 months, or sooner if needed    Time spent on day of visit 48 minutes- 11 minutes (8:36 AM through 8:47 AM) review of EMR prior to visit, 25 minutes (11:55 AM through 12:20 PM face-to-face meeting with patient, including discussion of risk/benefits, alternatives and possible side effects to medication, counseling on above issues, and prescription of medications in the EMR, 12 minutes (12:20 PM through 12:32 PM) documentation in the EMR      Jatin Mason MD    Answers for HPI/ROS submitted by the patient on 7/19/2023  If you checked off any problems, how difficult have these problems made it for you to do your work, take care of things at home, or get along with other people?: Somewhat difficult  PHQ9 TOTAL SCORE: 8  SHANA 7 TOTAL SCORE: 7

## 2023-10-09 ENCOUNTER — TELEPHONE (OUTPATIENT)
Dept: PSYCHIATRY | Facility: OTHER | Age: 58
End: 2023-10-09

## 2023-10-09 NOTE — TELEPHONE ENCOUNTER
Patient called in and cancelled his appointment today on 10/09 at 915 am because he is sick , he just would like for Marlon to know he is doing fine on his medications and is not looking to make any changes. He did reschedule his appointment for Jan 8th     Myranda Hess on 10/9/2023 at 9:22 AM

## 2023-10-10 DIAGNOSIS — F32.2 SEVERE MAJOR DEPRESSION (H): ICD-10-CM

## 2023-10-10 RX ORDER — SERTRALINE HYDROCHLORIDE 100 MG/1
100 TABLET, FILM COATED ORAL DAILY
Qty: 90 TABLET | Refills: 0 | Status: SHIPPED | OUTPATIENT
Start: 2023-10-10 | End: 2023-12-19

## 2023-10-10 RX ORDER — BUPROPION HYDROCHLORIDE 300 MG/1
300 TABLET ORAL EVERY MORNING
Qty: 90 TABLET | Refills: 0 | Status: SHIPPED | OUTPATIENT
Start: 2023-10-10 | End: 2023-12-19

## 2023-11-06 ENCOUNTER — TELEPHONE (OUTPATIENT)
Dept: INTERNAL MEDICINE | Facility: OTHER | Age: 58
End: 2023-11-06
Payer: MEDICAID

## 2023-11-06 NOTE — TELEPHONE ENCOUNTER
Patient requested a prescription for completely congested sinuses. Patient stated he has severe sinus headache due to it.    Pharmacy: [facility]    Patient request a call back to confirm.    Okay to leave detailed message.                Beulah Rowan on 11/6/2023 at 3:12 PM

## 2023-11-08 ENCOUNTER — VIRTUAL VISIT (OUTPATIENT)
Dept: INTERNAL MEDICINE | Facility: OTHER | Age: 58
End: 2023-11-08
Attending: INTERNAL MEDICINE
Payer: MEDICAID

## 2023-11-08 DIAGNOSIS — R51.9 FACIAL PAIN: ICD-10-CM

## 2023-11-08 DIAGNOSIS — J01.90 ACUTE SINUSITIS WITH COEXISTING CONDITION, NEED PROPHYLACTIC TREATMENT: Primary | ICD-10-CM

## 2023-11-08 PROCEDURE — 99442 PR PHYSICIAN TELEPHONE EVALUATION 11-20 MIN: CPT | Mod: 93 | Performed by: INTERNAL MEDICINE

## 2023-11-08 RX ORDER — HYDROCODONE BITARTRATE AND ACETAMINOPHEN 5; 325 MG/1; MG/1
1 TABLET ORAL EVERY 6 HOURS PRN
Qty: 10 TABLET | Refills: 0 | Status: SHIPPED | OUTPATIENT
Start: 2023-11-08 | End: 2023-11-11

## 2023-11-08 ASSESSMENT — ENCOUNTER SYMPTOMS: SINUS PRESSURE: 1

## 2023-11-08 NOTE — TELEPHONE ENCOUNTER
After proper verification, patient states that he has had a sinus infection for the last few days. He has tried over the counter medications which have not helped. Patient stated that he cannot even lay down due to the pressure and would like to get something for it. Would you like to fit him in for a telephone visit or video visit today or would you feel comfortable sending something in? Patient uses [facility].  Raquel Eaton LPN on 11/8/2023 at 10:12 AM  EXT. [id number]

## 2023-11-08 NOTE — PROGRESS NOTES
"Lazaro is a 58 year old who is being evaluated via a billable telephone visit.      What phone number would you like to be contacted at? [phone]  How would you like to obtain your AVS? Mail a copy    Distant Location (provider location):  On-site    Assessment & Plan     Acute sinusitis with coexisting condition, need prophylactic treatment  - encouraged to take a COVID test however given localized pain/symptoms, most consistent with bacterial illness due to duration and severity of illness  - will treat with antibiotics, patient to treat symptoms as instructed below, call if he has any ADR's or worsening symptoms  - recommend eating yogurt/kefir 1-2 times daily while on antibiotics  - amoxicillin-clavulanate (AUGMENTIN) 875-125 MG tablet; Take 1 tablet by mouth 2 times daily for 10 days  - HYDROcodone-acetaminophen (NORCO) 5-325 MG tablet; Take 1 tablet by mouth every 6 hours as needed for severe pain    Facial pain  - short course of of pain meds provided due severe pain  - HYDROcodone-acetaminophen (NORCO) 5-325 MG tablet; Take 1 tablet by mouth every 6 hours as needed for severe pain     BMI:   Estimated body mass index is 32.39 kg/m  as calculated from the following:    Height as of 7/19/23: 1.816 m (5' 11.5\").    Weight as of 7/19/23: 106.8 kg (235 lb 8 oz).   Weight management plan: Discussed healthy diet and exercise guidelines      Return if symptoms worsen or fail to improve.    Franca Perez,   [facility]    Subjective   Lazaro is a 58 year old, presenting for the following health issues:  Sinus Problem (Lots of pressure causing headaches)        11/8/2023     3:30 PM   Additional Questions   Roomed by Bette OSULLIVAN CNA/JAGUAR       Sinus Problem   This is a new problem. The current episode started more than 2 days ago. The problem has not changed since onset.There has been no fever. The pain is at a severity of 7/10. The pain is moderate. The pain has been Fluctuating since onset. " Associated symptoms include sinus pressure. He has tried acetaminophen for the symptoms.        Acute Illness  Acute illness concerns: Severe headaches, behind left eye  Onset/Duration: 1 week ago and worsened in the last 3 days  Symptoms:  Fever: No  Chills/Sweats: No  Headache (location?): YES  Sinus Pressure: YES  Conjunctivitis:  No  Ear Pain: YES: left  Rhinorrhea: No  Congestion: YES  Sore Throat: No  Cough: no  Wheeze: No  Decreased Appetite: No  Nausea: No  Vomiting: No  Diarrhea: No  Fatigue/Achiness: YES  Sick/Strep Exposure: No  Therapies tried and outcome: nasal spray, sinus cold/flu, ear drops    He typically gets sinus headaches every fall. He has increased pressure with lying down.     Review of Systems   HENT:  Positive for sinus pressure.           Objective    Vitals - Patient Reported  Weight (Patient Reported): 104.3 kg (230 lb)  Pain Score: Severe Pain (7)  Pain Loc: Head        Physical Exam   healthy, alert, and no distress  PSYCH: Alert and oriented times 3; coherent speech, normal   rate and volume, able to articulate logical thoughts, able   to abstract reason, no tangential thoughts, no hallucinations   or delusions  His affect is normal  RESP: No cough, no audible wheezing, able to talk in full sentences  Remainder of exam unable to be completed due to telephone visits        Phone call duration: 12 minutes

## 2023-11-08 NOTE — TELEPHONE ENCOUNTER
After proper verification, patient was relayed message from below.  Raquel Eaton LPN on 11/8/2023 at 11:16 AM  EXT. [id number]

## 2023-11-08 NOTE — TELEPHONE ENCOUNTER
Let's put him in my schedule for a phone visit at the end of the day. Please have him take a COVID test today if he has not in the past couple days.

## 2023-11-08 NOTE — NURSING NOTE
"Chief Complaint   Patient presents with    Sinus Problem     Lots of pressure causing headaches     Patient states that he has tried over the counter congestion medicine with no relief. Patient states that he has had sinus problem for about a week. Patient states that his nose isn't runny but rather plugged. Patient states it is causing bad headaches.    Initial There were no vitals taken for this visit. Estimated body mass index is 32.39 kg/m  as calculated from the following:    Height as of 7/19/23: 1.816 m (5' 11.5\").    Weight as of 7/19/23: 106.8 kg (235 lb 8 oz).       FOOD SECURITY SCREENING QUESTIONS:    The next two questions are to help us understand your food security.  If you are feeling you need any assistance in this area, we have resources available to support you today.    Hunger Vital Signs:  Within the past 12 months we worried whether our food would run out before we got money to buy more. Never  Within the past 12 months the food we bought just didn't last and we didn't have money to get more. Never  Bette Dejesus LPN on 11/8/2023 at 3:32 PM     Bette Dejesus   "

## 2023-12-14 ENCOUNTER — TELEPHONE (OUTPATIENT)
Dept: INTERNAL MEDICINE | Facility: OTHER | Age: 58
End: 2023-12-14
Payer: MEDICAID

## 2023-12-14 NOTE — TELEPHONE ENCOUNTER
That is her next available-if patient would like to see another provider that can see them sooner, please do so.

## 2023-12-19 DIAGNOSIS — F32.2 SEVERE MAJOR DEPRESSION (H): ICD-10-CM

## 2023-12-19 NOTE — TELEPHONE ENCOUNTER
Reason for call: Medication or medication refill    Name of medication requested: sertraline 100 mg and bupropion 300 mg    How many days of medication do you have left? TEN    What pharmacy do you use? [facility]    Preferred method for responding to this message: Telephone Call    Phone number patient can be reached at: Cell number on file:    Telephone Information:   Mobile [phone]       If we cannot reach you directly, may we leave a detailed response at the number you provided? Yes    Patient stated the pharmacy asked him to call [facility] and the patient requests enough medication until his appointment 03/05/2024.        Beulah Rowan on 12/19/2023 at 1:54 PM

## 2023-12-19 NOTE — TELEPHONE ENCOUNTER
Last Prescription Date: 10/10/2023  Last Qty/Refills: 90 / R-0  Last Office Visit: 11/08/2023  Future Office Visit: 7/03/2024     Requested Prescriptions   Pending Prescriptions Disp Refills    buPROPion (WELLBUTRIN XL) 300 MG 24 hr tablet 90 tablet 0     Sig: Take 1 tablet (300 mg) by mouth every morning       SSRIs Protocol Failed - 12/19/2023  1:59 PM        Failed - PHQ-9 score less than 5 in past 6 months     Please review last PHQ-9 score.        Last Prescription Date: 10/10/2023  Last Qty/Refills: 90 / R-0  Last Office Visit: 11/08/2023  Future Office Visit: 7/03/2024      sertraline (ZOLOFT) 100 MG tablet 90 tablet 0     Sig: Take 1 tablet (100 mg) by mouth daily       SSRIs Protocol Failed - 12/19/2023  1:59 PM        Failed - PHQ-9 score less than 5 in past 6 months     Please review last PHQ-9 score.        Routing refill request to provider for review/approval because:  Failed PHQ9    Emerald Barrios RN on 12/19/2023 at 2:01 PM

## 2023-12-20 RX ORDER — BUPROPION HYDROCHLORIDE 300 MG/1
300 TABLET ORAL EVERY MORNING
Qty: 90 TABLET | Refills: 0 | Status: SHIPPED | OUTPATIENT
Start: 2023-12-20 | End: 2024-03-05

## 2023-12-20 RX ORDER — SERTRALINE HYDROCHLORIDE 100 MG/1
100 TABLET, FILM COATED ORAL DAILY
Qty: 90 TABLET | Refills: 0 | Status: SHIPPED | OUTPATIENT
Start: 2023-12-20 | End: 2024-03-05

## 2023-12-29 NOTE — PROGRESS NOTES
Nursing Notes:   Matthew Ruiz LPN  2/3/2020 12:43 PM  Signed  Patient presents to the clinic for forms to be filled.  Medication Reconciliation Completed.    Matthew Ruiz LPN  2/3/2020 12:42 PM      SUBJECTIVE:     HPI  Lazaro Man is a 54 year old male who presents to clinic today to have medical opinion form completed since the new year has arrived. Has been out of work for chronic diarrhea. PCP has previously filled out the form but recommended recheck in 6 months.     Watery bowel movements 3-4 times daily. Last colonoscopy performed in 08/2019 which showed no signs of colitis. Has had negative colon biopsies in 08/2019. Negative celiac disease workup. Is currently prescribed cholestyramine 54gm/dose. Was recommended he see gastroenterology at his last visit with his PCP in summer 2019 but he has yet to do so. States he was unaware he was supposed to see GI and thought he only needed to see Dr. Phelps for further diagnostic evaluation. States Dr. Phelps prescribed Prilosec and that has been helping with the stomach cramping. States the cholestyramine did not help at all so has not been taking it. Symptoms have not changed at all since last evaluated by PCP six months ago. No blood in stool.       Review of Systems   Constitutional: Negative.    Gastrointestinal: Positive for abdominal pain, diarrhea and heartburn. Negative for hematochezia, rectal pain and vomiting.   Genitourinary: Negative.         PAST MEDICAL HISTORY:   Past Medical History:   Diagnosis Date     Chronic or unspecified gastric ulcer with perforation (H)     7/11/09     Closed displaced fracture of shaft of third metacarpal bone of left hand     11/29/2016     Closed nondisplaced fracture of proximal phalanx of left little finger     12/13/2016     Fracture of upper end of right humerus with routine healing     1/11/2018       PAST SURGICAL HISTORY:   Past Surgical History:   Procedure Laterality Date     COLONOSCOPY  04/04/2016     4/4/16,F/U 2021  5 years     COLONOSCOPY N/A 7/29/2019    Failed poor prep     COLONOSCOPY N/A 8/26/2019    F/U 2022 Advanced serrated adenoma     ESOPHAGOSCOPY, GASTROSCOPY, DUODENOSCOPY (EGD), COMBINED N/A 7/29/2019    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     OTHER SURGICAL HISTORY      07/11/09,33424.0,ID REPAIR PERF DUOD RUBY ULC/WND/INJURY,Repair of perforated posterior lesser curve gastric ulcer     OTHER SURGICAL HISTORY      01/31/2012,,HERNIA REPAIR,with mesh underlay       FAMILY HISTORY:   Family History   Problem Relation Age of Onset     Colon Cancer Mother         Cancer-colon     Colon Cancer Other         Cancer-colon       SOCIAL HISTORY:   Social History     Tobacco Use     Smoking status: Light Tobacco Smoker     Packs/day: 0.25     Types: Cigarettes     Smokeless tobacco: Never Used   Substance Use Topics     Alcohol use: Not Currently     Alcohol/week: 12.0 standard drinks     Comment: Alcoholic Drinks/day: rare      No Known Allergies  Current Outpatient Medications   Medication     citalopram (CELEXA) 20 MG tablet     lisinopril-hydrochlorothiazide (PRINZIDE/ZESTORETIC) 10-12.5 MG per tablet     omeprazole (PRILOSEC OTC) 20 MG EC tablet     No current facility-administered medications for this visit.          OBJECTIVE:     BP (!) 130/90 (BP Location: Right arm, Patient Position: Sitting, Cuff Size: Adult Large)   Pulse 86   Temp 98.6  F (37  C)   Resp 18   Ht 1.829 m (6')   Wt 108 kg (238 lb 3.2 oz)   SpO2 97%   BMI 32.31 kg/m    Body mass index is 32.31 kg/m .  Physical Exam  General: Pleasant, in no apparent distress.  Cardiovascular: Regular rate and rhythm with S1 equal to S2. No murmurs, friction rubs, or gallops.   Respiratory: Lungs are resonant and clear to auscultation bilaterally. No wheezes, crackles, or rhonchi.  Abdomen: Abdomen is non-distended and without bulging flanks, prominent venous markings, or ecchymosis. Active bowel sounds heard in all four quadrants. No  tenderness to palpation in all four quadrants. No rebound tenderness or guarding. No palpable masses noted. No hepatosplenomegaly.  Psych: Appropriate mood and affect.      ASSESSMENT/PLAN:     1. Chronic diarrhea      Encouraged patient to schedule an appointment with gastroenterology. Referral placed for this again today. Medical opinion form was completed today stating he can not work still due to the diarrhea, but we discussed he must be seen by GI going forward for further evaluation and possible treatment. Needs to be rechecked in 6 months for re-evaluation and consideration for completion of the medical opinion form again after seeing GI. Discussed possibly trying loperamide, however patient states he tried this before and it was unsuccessful.     Greater than 50% of this 30 minute appointment spent on counseling    Mirlande Tucker PA-C  [facility]     CDU MD YUAN:  I performed a face to face bedside interview with patient regarding history of present illness, review of symptoms and past medical history. I completed an independent physical exam.  I have discussed patient's plan of care with PA.   I agree with note as stated above, having amended the EMR as needed to reflect my findings. I have discussed the assessment and plan of care.  This includes during the time I functioned as the attending physician for this patient.

## 2024-02-12 DIAGNOSIS — I10 BENIGN ESSENTIAL HYPERTENSION: ICD-10-CM

## 2024-02-13 RX ORDER — HYDROCHLOROTHIAZIDE 25 MG/1
25 TABLET ORAL DAILY
Qty: 90 TABLET | Refills: 0 | Status: SHIPPED | OUTPATIENT
Start: 2024-02-13 | End: 2024-02-15

## 2024-02-13 NOTE — TELEPHONE ENCOUNTER
[facility] sent Rx request for the following:      Requested Prescriptions   Pending Prescriptions Disp Refills    hydrochlorothiazide (HYDRODIURIL) 25 MG tablet [Pharmacy Med Name: HYDROCHLOROTHIAZIDE 25MG TABLETS] 90 tablet 3     Sig: TAKE 1 TABLET(25 MG) BY MOUTH DAILY       Diuretics (Including Combos) Protocol Failed - 2/13/2024  8:39 AM     Last Prescription Date:   2/2/23  Last Fill Qty/Refills:         90, R-3    Last Office Visit:              2/2/23   Future Office visit:           none     Cynthia Solis RN on 2/13/2024 at 8:40 AM

## 2024-02-14 ENCOUNTER — TELEPHONE (OUTPATIENT)
Dept: CARDIOLOGY | Facility: OTHER | Age: 59
End: 2024-02-14
Payer: MEDICAID

## 2024-02-14 NOTE — TELEPHONE ENCOUNTER
Call to patient, verified name and date of birth.     He states he has been taking two tablets of hydrochlorathiazide 25 mg since July. His blood pressure was 193/131 at his last clinic appointment, was advised to double the dose.  Has not checked his blood pressure since.   He denies headache, vision changes, lightheadedness, chest pain or shortness of breath.  He has questions on treatment for high blood pressure if he were to present to ED, these were answered.  I advised patient to follow up as soon as possible with any available provider for medication management.  I also advised to buy a BP cuff, education and instructions on how to take his BP and parameters to report. Advised him to keep a log of his blood pressure readings.   He is worried this costs too much. I advised a nurse only visit in the clinic for a BP check.    Patient states he will call back to schedule an appointment.    Cynthia Solis RN on 2/14/2024 at 9:34 AM

## 2024-02-15 ENCOUNTER — TELEPHONE (OUTPATIENT)
Dept: CARDIOLOGY | Facility: OTHER | Age: 59
End: 2024-02-15
Payer: MEDICAID

## 2024-02-15 DIAGNOSIS — R06.09 DOE (DYSPNEA ON EXERTION): Primary | ICD-10-CM

## 2024-02-15 DIAGNOSIS — Z12.5 SCREENING FOR PROSTATE CANCER: ICD-10-CM

## 2024-02-15 DIAGNOSIS — G47.33 OSA (OBSTRUCTIVE SLEEP APNEA): ICD-10-CM

## 2024-02-15 DIAGNOSIS — Z13.220 LIPID SCREENING: ICD-10-CM

## 2024-02-15 DIAGNOSIS — R73.03 PREDIABETES: ICD-10-CM

## 2024-02-15 DIAGNOSIS — I10 BENIGN ESSENTIAL HYPERTENSION: ICD-10-CM

## 2024-02-15 RX ORDER — HYDROCHLOROTHIAZIDE 50 MG/1
50 TABLET ORAL DAILY
Qty: 90 TABLET | Refills: 3 | Status: SHIPPED | OUTPATIENT
Start: 2024-02-15

## 2024-02-15 NOTE — TELEPHONE ENCOUNTER
Patient called stating he needs Dr. Jack to send something to insurance stating he takes two hydrochlorathiazide 25 mg a day instead of one. At the moment they are not allowing him a refill until March 2nd due to it having said 1 pill a day. Any questions can be answered by patient at number on file. Okay to leave message if needed.    Elyse Powell on 2/15/2024 at 10:35 AM

## 2024-02-15 NOTE — TELEPHONE ENCOUNTER
Our records show that he is on hydrochlorothiazide 25 mg once a day.  I sent a new prescription for hydrochlorothiazide 50 mg once a day to [facility] pharmacy.  Hydrochlorothiazide can affect his electrolytes.  I did place order for labs.  Please let the patient know he should come in and have labs drawn.  Orders placed.  Thanks!     Dr. Jack   After verification, patient notified.  Zabrina Bruce LPN ....................2/15/2024   4:39 PM

## 2024-03-05 ENCOUNTER — OFFICE VISIT (OUTPATIENT)
Dept: PSYCHIATRY | Facility: OTHER | Age: 59
End: 2024-03-05
Attending: PSYCHIATRY & NEUROLOGY
Payer: MEDICAID

## 2024-03-05 VITALS
WEIGHT: 217.2 LBS | OXYGEN SATURATION: 98 % | DIASTOLIC BLOOD PRESSURE: 99 MMHG | HEIGHT: 72 IN | RESPIRATION RATE: 18 BRPM | TEMPERATURE: 97.6 F | HEART RATE: 95 BPM | SYSTOLIC BLOOD PRESSURE: 141 MMHG | BODY MASS INDEX: 29.42 KG/M2

## 2024-03-05 DIAGNOSIS — G47.00 INSOMNIA, UNSPECIFIED TYPE: Primary | ICD-10-CM

## 2024-03-05 DIAGNOSIS — F32.2 SEVERE MAJOR DEPRESSION (H): ICD-10-CM

## 2024-03-05 PROCEDURE — G0463 HOSPITAL OUTPT CLINIC VISIT: HCPCS

## 2024-03-05 PROCEDURE — 99215 OFFICE O/P EST HI 40 MIN: CPT | Performed by: PSYCHIATRY & NEUROLOGY

## 2024-03-05 RX ORDER — ZOLPIDEM TARTRATE 10 MG/1
10 TABLET ORAL
Qty: 90 TABLET | Refills: 1 | Status: SHIPPED | OUTPATIENT
Start: 2024-03-05 | End: 2024-06-25

## 2024-03-05 RX ORDER — BUPROPION HYDROCHLORIDE 300 MG/1
300 TABLET ORAL EVERY MORNING
Qty: 90 TABLET | Refills: 1 | Status: SHIPPED | OUTPATIENT
Start: 2024-03-05 | End: 2024-09-16

## 2024-03-05 RX ORDER — SERTRALINE HYDROCHLORIDE 100 MG/1
100 TABLET, FILM COATED ORAL DAILY
Qty: 90 TABLET | Refills: 1 | Status: SHIPPED | OUTPATIENT
Start: 2024-03-05 | End: 2024-09-16

## 2024-03-05 ASSESSMENT — ANXIETY QUESTIONNAIRES
5. BEING SO RESTLESS THAT IT IS HARD TO SIT STILL: NOT AT ALL
GAD7 TOTAL SCORE: 10
2. NOT BEING ABLE TO STOP OR CONTROL WORRYING: MORE THAN HALF THE DAYS
8. IF YOU CHECKED OFF ANY PROBLEMS, HOW DIFFICULT HAVE THESE MADE IT FOR YOU TO DO YOUR WORK, TAKE CARE OF THINGS AT HOME, OR GET ALONG WITH OTHER PEOPLE?: VERY DIFFICULT
3. WORRYING TOO MUCH ABOUT DIFFERENT THINGS: MORE THAN HALF THE DAYS
4. TROUBLE RELAXING: SEVERAL DAYS
GAD7 TOTAL SCORE: 10
1. FEELING NERVOUS, ANXIOUS, OR ON EDGE: SEVERAL DAYS
IF YOU CHECKED OFF ANY PROBLEMS ON THIS QUESTIONNAIRE, HOW DIFFICULT HAVE THESE PROBLEMS MADE IT FOR YOU TO DO YOUR WORK, TAKE CARE OF THINGS AT HOME, OR GET ALONG WITH OTHER PEOPLE: VERY DIFFICULT
6. BECOMING EASILY ANNOYED OR IRRITABLE: MORE THAN HALF THE DAYS
GAD7 TOTAL SCORE: 10
7. FEELING AFRAID AS IF SOMETHING AWFUL MIGHT HAPPEN: MORE THAN HALF THE DAYS
7. FEELING AFRAID AS IF SOMETHING AWFUL MIGHT HAPPEN: MORE THAN HALF THE DAYS

## 2024-03-05 ASSESSMENT — PAIN SCALES - GENERAL
PAINLEVEL: NO PAIN (0)
PAINLEVEL: MILD PAIN (3)

## 2024-03-05 ASSESSMENT — PATIENT HEALTH QUESTIONNAIRE - PHQ9
SUM OF ALL RESPONSES TO PHQ QUESTIONS 1-9: 8
SUM OF ALL RESPONSES TO PHQ QUESTIONS 1-9: 8
10. IF YOU CHECKED OFF ANY PROBLEMS, HOW DIFFICULT HAVE THESE PROBLEMS MADE IT FOR YOU TO DO YOUR WORK, TAKE CARE OF THINGS AT HOME, OR GET ALONG WITH OTHER PEOPLE: VERY DIFFICULT

## 2024-03-05 NOTE — NURSING NOTE
Chief Complaint   Patient presents with    Recheck Medication     Follow up       Initial BP (!) 146/117   Pulse 95   Temp 97.6  F (36.4  C) (Tympanic)   Resp 18   Ht 1.829 m (6')   Wt 98.5 kg (217 lb 3.2 oz)   SpO2 98%   BMI 29.46 kg/m   Estimated body mass index is 29.46 kg/m  as calculated from the following:    Height as of this encounter: 1.829 m (6').    Weight as of this encounter: 98.5 kg (217 lb 3.2 oz).  Medication Review: complete    The next two questions are to help us understand your food security.  If you are feeling you need any assistance in this area, we have resources available to support you today.           No data to display                  Health Care Directive:  Patient does not have a Health Care Directive or Living Will: Discussed advance care planning with patient; however, patient declined at this time.    Shavonne Pena

## 2024-03-05 NOTE — PROGRESS NOTES
"Psychiatric Progress Note/Visit  March 5, 2024    Identifying Data:  This is a 58-year-old man seen for follow-up psychiatric medication management visit for treatment of depression, anxiety and insomnia    Interval History:  Lazaro was seen most recently on July 19, 2023.  At that time he was taking only Zoloft and Wellbutrin and felt that the medications were working well.  He was no longer taking trazodone or prazosin and was not having problems with nightmares.  We did not make any medication changes.    Today he reports that he is pleased that he has changed his diet and his blood pressure has gone down and he has been losing some weight.  He notes that he still has significant stressors in his life that he experiences as \"kind of scary\", especially the possibility of having to move if his landlady sells the house that he lives behind.  He also reported some recent problems with sleep and \"scary dreams\".  He says that prazosin was not helpful in the past, but he has taken Ambien with no side effects.  He notes that when he took Ambien he did not remember his dreams and he hopes that that would happen again.    Mental Status Exam:  Anxiety remains minimal, depression remains minimal to absent.  Remainder of MSE is essentially unchanged from July 19, 2023.    Current Psychiatric Medications:  Wellbutrin  mg a day  Zoloft 100 mg a day    Lab Tests/Results:  Laboratory studies were not ordered previously and none are being ordered today.    Diagnosis:  Major depression, recurrent, severe without psychosis  Anxiety, unspecified  Insomnia  Rule out PTSD    Impression/Assessment:  Lazaro continues to do well on his current psychiatric medications, but is experiencing more situational stressors and is having more trouble with sleep.  We discussed a retrial of Ambien, as he has taken it without problems before and he found that it helped decrease his dreams, which he would find helpful at present.    Plan:  Add Ambien " 10 mg as needed at bedtime for insomnia  Continue Wellbutrin  mg a day for depression  Continue Zoloft 100 mg a day for depression and anxiety  Continue individual psychotherapy  Follow-up with Dr. Mason in 3 months, or sooner if needed    Time spent on day of visit 46 minutes - 7 minutes (8:29 AM through 8:36 AM) review of EMR prior to visit, 30 minutes (2:27 PM through 2:57 PM) face-to-face meeting with patient, including discussion of risk/benefits, alternatives and possible side effects to medication, counseling on above issues, and prescription of medications in the EMR, 9 minutes (2:57 PM through 3:06 PM) documentation in the EMR      Jatin Mason MD    Answers submitted by the patient for this visit:  Patient Health Questionnaire (Submitted on 3/5/2024)  If you checked off any problems, how difficult have these problems made it for you to do your work, take care of things at home, or get along with other people?: Very difficult  PHQ9 TOTAL SCORE: 8  SHANA-7 (Submitted on 3/5/2024)  SHANA 7 TOTAL SCORE: 10

## 2024-06-06 DIAGNOSIS — R06.02 SHORTNESS OF BREATH: ICD-10-CM

## 2024-06-10 RX ORDER — ALBUTEROL SULFATE 90 UG/1
AEROSOL, METERED RESPIRATORY (INHALATION)
Qty: 18 G | Refills: 4 | Status: SHIPPED | OUTPATIENT
Start: 2024-06-10

## 2024-06-10 NOTE — TELEPHONE ENCOUNTER
Prescription refilled per RN Medication Refill Policy..................Yamini Cobb RN 6/10/2024 11:25 AM

## 2024-06-25 ENCOUNTER — OFFICE VISIT (OUTPATIENT)
Dept: PSYCHIATRY | Facility: OTHER | Age: 59
End: 2024-06-25
Attending: PSYCHIATRY & NEUROLOGY
Payer: MEDICAID

## 2024-06-25 VITALS
DIASTOLIC BLOOD PRESSURE: 84 MMHG | HEIGHT: 72 IN | BODY MASS INDEX: 30.48 KG/M2 | SYSTOLIC BLOOD PRESSURE: 131 MMHG | OXYGEN SATURATION: 99 % | HEART RATE: 95 BPM | WEIGHT: 225 LBS | RESPIRATION RATE: 18 BRPM

## 2024-06-25 DIAGNOSIS — G47.00 INSOMNIA, UNSPECIFIED TYPE: ICD-10-CM

## 2024-06-25 PROCEDURE — G0463 HOSPITAL OUTPT CLINIC VISIT: HCPCS

## 2024-06-25 PROCEDURE — 99215 OFFICE O/P EST HI 40 MIN: CPT | Performed by: PSYCHIATRY & NEUROLOGY

## 2024-06-25 RX ORDER — ZOLPIDEM TARTRATE 10 MG/1
10 TABLET ORAL
Qty: 30 TABLET | Refills: 5 | Status: SHIPPED | OUTPATIENT
Start: 2024-06-25

## 2024-06-25 ASSESSMENT — ANXIETY QUESTIONNAIRES
8. IF YOU CHECKED OFF ANY PROBLEMS, HOW DIFFICULT HAVE THESE MADE IT FOR YOU TO DO YOUR WORK, TAKE CARE OF THINGS AT HOME, OR GET ALONG WITH OTHER PEOPLE?: VERY DIFFICULT
GAD7 TOTAL SCORE: 8
4. TROUBLE RELAXING: SEVERAL DAYS
IF YOU CHECKED OFF ANY PROBLEMS ON THIS QUESTIONNAIRE, HOW DIFFICULT HAVE THESE PROBLEMS MADE IT FOR YOU TO DO YOUR WORK, TAKE CARE OF THINGS AT HOME, OR GET ALONG WITH OTHER PEOPLE: VERY DIFFICULT
6. BECOMING EASILY ANNOYED OR IRRITABLE: SEVERAL DAYS
GAD7 TOTAL SCORE: 8
5. BEING SO RESTLESS THAT IT IS HARD TO SIT STILL: NOT AT ALL
7. FEELING AFRAID AS IF SOMETHING AWFUL MIGHT HAPPEN: SEVERAL DAYS
2. NOT BEING ABLE TO STOP OR CONTROL WORRYING: MORE THAN HALF THE DAYS
7. FEELING AFRAID AS IF SOMETHING AWFUL MIGHT HAPPEN: SEVERAL DAYS
1. FEELING NERVOUS, ANXIOUS, OR ON EDGE: SEVERAL DAYS
3. WORRYING TOO MUCH ABOUT DIFFERENT THINGS: MORE THAN HALF THE DAYS

## 2024-06-25 ASSESSMENT — PAIN SCALES - GENERAL: PAINLEVEL: MILD PAIN (3)

## 2024-06-25 NOTE — PROGRESS NOTES
"Psychiatric Progress Note/Visit  June 25, 2024    Identifying Data:  This is a 59-year-old man seen for psychiatric medication management follow-up visit for treatment depression, anxiety and insomnia.    Interval History:  He was seen most recently on March 5, 2024.  At that time he reported feeling good about changing his diet and seeing his blood pressure get better and his weight go down.  He talked about some significant stressors in his life, especially the possibility of having to move from his current residence.  He noted that he was having more problems getting to sleep and was having \"scary dreams\".  He said that in the past he had done well with Ambien, but not with prazosin.  We decided on a trial of as needed Ambien.    Today he reports that he continues to make progress and is finding that the Ambien is very helpful when he needs to use it.  He says he is using it only about 2-3 times a week, but needs to take more than 1 tablet at a time for effectiveness.  I encouraged him to try taking 1 and 1/2 tablets, but he could continue to take 2 tablets if needed.  We had a long discussion about challenges dealing with his living situation and he is working on planning, but not worrying so much.  He also noted that his blood pressure was getting better and that he was \"barely smoking\", only about 3-4 cigarettes a day.    Mental Status Exam:  Anxiety remains minimal, depression remains minimal to absent.  Remainder of MSE is essentially unchanged from March 5, 2024.    Current Psychiatric Medications:  Wellbutrin  mg a day  Zoloft 100 mg a day   Ambien 10-20 mg at night as needed    Lab Tests/Results:  Laboratory studies were not ordered previously and none are being ordered today.    Diagnosis:  Major depression, recurrent, severe without psychosis  Anxiety, unspecified  Insomnia  Rule out PTSD    Impression/Assessment:  Lazaro continues to make progress in dealing with stress in his life.  He is doing " well on his medications, but still tends to spend too much time worrying.  I discussed a technique where he spends 10 minutes in the morning and 10 minutes in the evening watching the minute hand of the clock  while he does all his worrying.    Plan:  Continue Wellbutrin  mg a day for depression    Continue Zoloft 100 mg a day for depression and anxiety  Continue Ambien 15-20 mg as needed at bedtime for insomnia  Follow-up with Dr. Mason in 3 months    Time spent on day of visit 46 minutes - 6 minutes (8:05 AM through 8:11 AM) review of EMR prior to visit, 30 minutes (10:48 AM through 11:18 AM) face-to-face meeting with patient, including discussion of risk/benefits, alternatives and possible side effects to medication, counseling on above issues, and prescription of medications in the EMR, 10 minutes (11:18 AM through 11:28 AM) documentation in the EMR      Jatin Mason MD    Answers submitted by the patient for this visit:  SHANA-7 (Submitted on 6/25/2024)  SHANA 7 TOTAL SCORE: 8

## 2024-07-03 ENCOUNTER — OFFICE VISIT (OUTPATIENT)
Dept: INTERNAL MEDICINE | Facility: OTHER | Age: 59
End: 2024-07-03
Attending: INTERNAL MEDICINE
Payer: MEDICAID

## 2024-07-03 VITALS
TEMPERATURE: 98.6 F | HEART RATE: 104 BPM | SYSTOLIC BLOOD PRESSURE: 128 MMHG | DIASTOLIC BLOOD PRESSURE: 88 MMHG | OXYGEN SATURATION: 98 % | HEIGHT: 72 IN | RESPIRATION RATE: 18 BRPM | BODY MASS INDEX: 30.75 KG/M2 | WEIGHT: 227 LBS

## 2024-07-03 DIAGNOSIS — K22.70 BARRETT'S ESOPHAGUS WITHOUT DYSPLASIA: ICD-10-CM

## 2024-07-03 DIAGNOSIS — Z13.220 SCREENING FOR HYPERLIPIDEMIA: ICD-10-CM

## 2024-07-03 DIAGNOSIS — Z12.11 SCREENING FOR COLON CANCER: ICD-10-CM

## 2024-07-03 DIAGNOSIS — Z13.6 SCREENING FOR AAA (ABDOMINAL AORTIC ANEURYSM): ICD-10-CM

## 2024-07-03 DIAGNOSIS — Z87.891 PERSONAL HISTORY OF TOBACCO USE: ICD-10-CM

## 2024-07-03 DIAGNOSIS — I71.21 ANEURYSM OF ASCENDING AORTA WITHOUT RUPTURE (H): ICD-10-CM

## 2024-07-03 DIAGNOSIS — Z12.5 SCREENING PSA (PROSTATE SPECIFIC ANTIGEN): ICD-10-CM

## 2024-07-03 DIAGNOSIS — Z13.1 SCREENING FOR DIABETES MELLITUS: ICD-10-CM

## 2024-07-03 DIAGNOSIS — R73.03 PREDIABETES: ICD-10-CM

## 2024-07-03 DIAGNOSIS — F17.200 NICOTINE DEPENDENCE, UNCOMPLICATED, UNSPECIFIED NICOTINE PRODUCT TYPE: Primary | ICD-10-CM

## 2024-07-03 DIAGNOSIS — I10 BENIGN ESSENTIAL HYPERTENSION: ICD-10-CM

## 2024-07-03 PROBLEM — Z91.89 SEDENTARY LIFESTYLE: Status: RESOLVED | Noted: 2023-02-02 | Resolved: 2024-07-03

## 2024-07-03 PROBLEM — S22.42XK CLOSED FRACTURE OF MULTIPLE RIBS OF LEFT SIDE WITH NONUNION, SUBSEQUENT ENCOUNTER: Status: RESOLVED | Noted: 2023-02-02 | Resolved: 2024-07-03

## 2024-07-03 PROBLEM — Z13.9 SCREENING FOR CONDITION: Status: RESOLVED | Noted: 2020-08-11 | Resolved: 2024-07-03

## 2024-07-03 PROBLEM — Z00.00 ENCOUNTER FOR ANNUAL PHYSICAL EXAM: Status: RESOLVED | Noted: 2020-08-11 | Resolved: 2024-07-03

## 2024-07-03 PROBLEM — R07.89 ATYPICAL CHEST PAIN: Status: RESOLVED | Noted: 2023-02-02 | Resolved: 2024-07-03

## 2024-07-03 PROBLEM — R06.09 DOE (DYSPNEA ON EXERTION): Status: RESOLVED | Noted: 2023-02-02 | Resolved: 2024-07-03

## 2024-07-03 LAB
ALBUMIN SERPL BCG-MCNC: 4.5 G/DL (ref 3.5–5.2)
ALP SERPL-CCNC: 74 U/L (ref 40–150)
ALT SERPL W P-5'-P-CCNC: 21 U/L (ref 0–70)
ANION GAP SERPL CALCULATED.3IONS-SCNC: 11 MMOL/L (ref 7–15)
AST SERPL W P-5'-P-CCNC: 23 U/L (ref 0–45)
BILIRUB SERPL-MCNC: 0.3 MG/DL
BUN SERPL-MCNC: 9.8 MG/DL (ref 8–23)
CALCIUM SERPL-MCNC: 9.9 MG/DL (ref 8.8–10.2)
CHLORIDE SERPL-SCNC: 95 MMOL/L (ref 98–107)
CHOLEST SERPL-MCNC: 181 MG/DL
CREAT SERPL-MCNC: 0.69 MG/DL (ref 0.67–1.17)
DEPRECATED HCO3 PLAS-SCNC: 30 MMOL/L (ref 22–29)
EGFRCR SERPLBLD CKD-EPI 2021: >90 ML/MIN/1.73M2
ERYTHROCYTE [DISTWIDTH] IN BLOOD BY AUTOMATED COUNT: 12.3 % (ref 10–15)
FASTING STATUS PATIENT QL REPORTED: ABNORMAL
FASTING STATUS PATIENT QL REPORTED: ABNORMAL
GLUCOSE SERPL-MCNC: 109 MG/DL (ref 70–99)
HBA1C MFR BLD: 5.9 % (ref 4–6.2)
HCT VFR BLD AUTO: 47.2 % (ref 40–53)
HDLC SERPL-MCNC: 58 MG/DL
HGB BLD-MCNC: 16.9 G/DL (ref 13.3–17.7)
LDLC SERPL CALC-MCNC: 101 MG/DL
MAGNESIUM SERPL-MCNC: 2 MG/DL (ref 1.7–2.3)
MCH RBC QN AUTO: 35.4 PG (ref 26.5–33)
MCHC RBC AUTO-ENTMCNC: 35.8 G/DL (ref 31.5–36.5)
MCV RBC AUTO: 99 FL (ref 78–100)
NONHDLC SERPL-MCNC: 123 MG/DL
PLATELET # BLD AUTO: 315 10E3/UL (ref 150–450)
POTASSIUM SERPL-SCNC: 3.6 MMOL/L (ref 3.4–5.3)
PROT SERPL-MCNC: 7.7 G/DL (ref 6.4–8.3)
PSA SERPL DL<=0.01 NG/ML-MCNC: 1.08 NG/ML (ref 0–4.5)
RBC # BLD AUTO: 4.77 10E6/UL (ref 4.4–5.9)
SODIUM SERPL-SCNC: 136 MMOL/L (ref 135–145)
TRIGL SERPL-MCNC: 112 MG/DL
TSH SERPL DL<=0.005 MIU/L-ACNC: 0.89 UIU/ML (ref 0.3–4.2)
WBC # BLD AUTO: 9.4 10E3/UL (ref 4–11)

## 2024-07-03 PROCEDURE — 84443 ASSAY THYROID STIM HORMONE: CPT | Mod: ZL | Performed by: INTERNAL MEDICINE

## 2024-07-03 PROCEDURE — G0296 VISIT TO DETERM LDCT ELIG: HCPCS | Performed by: INTERNAL MEDICINE

## 2024-07-03 PROCEDURE — 83036 HEMOGLOBIN GLYCOSYLATED A1C: CPT | Mod: ZL | Performed by: INTERNAL MEDICINE

## 2024-07-03 PROCEDURE — 80053 COMPREHEN METABOLIC PANEL: CPT | Mod: ZL | Performed by: INTERNAL MEDICINE

## 2024-07-03 PROCEDURE — 99396 PREV VISIT EST AGE 40-64: CPT | Mod: 25 | Performed by: INTERNAL MEDICINE

## 2024-07-03 PROCEDURE — 85027 COMPLETE CBC AUTOMATED: CPT | Mod: ZL | Performed by: INTERNAL MEDICINE

## 2024-07-03 PROCEDURE — 83735 ASSAY OF MAGNESIUM: CPT | Mod: ZL | Performed by: INTERNAL MEDICINE

## 2024-07-03 PROCEDURE — 36415 COLL VENOUS BLD VENIPUNCTURE: CPT | Mod: ZL | Performed by: INTERNAL MEDICINE

## 2024-07-03 PROCEDURE — G0103 PSA SCREENING: HCPCS | Mod: ZL | Performed by: INTERNAL MEDICINE

## 2024-07-03 PROCEDURE — 80061 LIPID PANEL: CPT | Mod: ZL | Performed by: INTERNAL MEDICINE

## 2024-07-03 PROCEDURE — 99406 BEHAV CHNG SMOKING 3-10 MIN: CPT | Performed by: INTERNAL MEDICINE

## 2024-07-03 SDOH — HEALTH STABILITY: PHYSICAL HEALTH: ON AVERAGE, HOW MANY MINUTES DO YOU ENGAGE IN EXERCISE AT THIS LEVEL?: 40 MIN

## 2024-07-03 SDOH — HEALTH STABILITY: PHYSICAL HEALTH: ON AVERAGE, HOW MANY DAYS PER WEEK DO YOU ENGAGE IN MODERATE TO STRENUOUS EXERCISE (LIKE A BRISK WALK)?: 6 DAYS

## 2024-07-03 ASSESSMENT — SOCIAL DETERMINANTS OF HEALTH (SDOH): HOW OFTEN DO YOU GET TOGETHER WITH FRIENDS OR RELATIVES?: NEVER

## 2024-07-03 ASSESSMENT — PATIENT HEALTH QUESTIONNAIRE - PHQ9
SUM OF ALL RESPONSES TO PHQ QUESTIONS 1-9: 7
10. IF YOU CHECKED OFF ANY PROBLEMS, HOW DIFFICULT HAVE THESE PROBLEMS MADE IT FOR YOU TO DO YOUR WORK, TAKE CARE OF THINGS AT HOME, OR GET ALONG WITH OTHER PEOPLE: VERY DIFFICULT
SUM OF ALL RESPONSES TO PHQ QUESTIONS 1-9: 7

## 2024-07-03 ASSESSMENT — PAIN SCALES - GENERAL: PAINLEVEL: NO PAIN (0)

## 2024-07-03 NOTE — NURSING NOTE
"Chief Complaint   Patient presents with    Physical       Initial /88   Pulse 104   Temp 98.6  F (37  C)   Resp 18   Ht 1.822 m (5' 11.75\")   Wt 103 kg (227 lb)   SpO2 98%   BMI 31.00 kg/m   Estimated body mass index is 31 kg/m  as calculated from the following:    Height as of this encounter: 1.822 m (5' 11.75\").    Weight as of this encounter: 103 kg (227 lb).  Medication Review: complete    The next two questions are to help us understand your food security.  If you are feeling you need any assistance in this area, we have resources available to support you today.          7/3/2024   SDOH- Food Insecurity   Within the past 12 months, did you worry that your food would run out before you got money to buy more? N   Within the past 12 months, did the food you bought just not last and you didn t have money to get more? N            Health Care Directive:  Patient does not have a Health Care Directive or Living Will: Discussed advance care planning with patient; however, patient declined at this time.    Ashley Sanchez LPN      "

## 2024-07-03 NOTE — PROGRESS NOTES
Lung Cancer Screening Shared Decision Making Visit     Lazaro Man, a 59 year old male, is eligible for lung cancer screening    History   Smoking Status     Former     Types: Cigarettes   Smokeless Tobacco     Never       I have discussed with patient the risks and benefits of screening for lung cancer with low-dose CT.     The risks include:    radiation exposure: one low dose chest CT has as much ionizing radiation as about 15 chest x-rays, or 6 months of background radiation living in [place]      false positives: most findings/nodules are NOT cancer, but some might still require additional diagnostic evaluation, including biopsy    over-diagnosis: some slow growing cancers that might never have been clinically significant will be detected and treated unnecessarily     The benefit of early detection of lung cancer is contingent upon adherence to annual screening or more frequent follow up if indicated.     Furthermore, to benefit from screening, Lazaro must be willing and able to undergo diagnostic procedures, if indicated. Although no specific guide is available for determining severity of comorbidities, it is reasonable to withhold screening in patients who have greater mortality risk from other diseases.     We did discuss that the best way to prevent lung cancer is to not smoke.    Some patients may value a numeric estimation of lung cancer risk when evaluating if lung cancer screening is right for them, here is one calculator:    ShouldIScreen

## 2024-07-03 NOTE — LETTER
July 9, 2024      Laazro Man  [address] [address]        Dear ,    We are writing to inform you of your test results.    Your test results fall within the expected range(s) or remain unchanged from previous results.  Cholesterol is increased slightly and it would be reasonable to consider medication to lower cholesterol.  Please let me know if you would be interested in trying this as it likely would benefit you in the long run and I can send one into the pharmacy.  Otherwise please continue with current treatment plan.    Resulted Orders   Comprehensive metabolic panel   Result Value Ref Range    Sodium 136 135 - 145 mmol/L    Potassium 3.6 3.4 - 5.3 mmol/L    Carbon Dioxide (CO2) 30 (H) 22 - 29 mmol/L    Anion Gap 11 7 - 15 mmol/L    Urea Nitrogen 9.8 8.0 - 23.0 mg/dL    Creatinine 0.69 0.67 - 1.17 mg/dL    GFR Estimate >90 >60 mL/min/1.73m2      Comment:      eGFR calculated using 2021 CKD-EPI equation.    Calcium 9.9 8.8 - 10.2 mg/dL    Chloride 95 (L) 98 - 107 mmol/L    Glucose 109 (H) 70 - 99 mg/dL    Alkaline Phosphatase 74 40 - 150 U/L    AST 23 0 - 45 U/L      Comment:      Reference intervals for this test were updated on 6/12/2023 to more accurately reflect our healthy population. There may be differences in the flagging of prior results with similar values performed with this method. Interpretation of those prior results can be made in the context of the updated reference intervals.    ALT 21 0 - 70 U/L      Comment:      Reference intervals for this test were updated on 6/12/2023 to more accurately reflect our healthy population. There may be differences in the flagging of prior results with similar values performed with this method. Interpretation of those prior results can be made in the context of the updated reference intervals.      Protein Total 7.7 6.4 - 8.3 g/dL    Albumin 4.5 3.5 - 5.2 g/dL    Bilirubin Total 0.3 <=1.2 mg/dL    Patient Fasting > 8hrs?  Unknown    CBC with platelets   Result Value Ref Range    WBC Count 9.4 4.0 - 11.0 10e3/uL    RBC Count 4.77 4.40 - 5.90 10e6/uL    Hemoglobin 16.9 13.3 - 17.7 g/dL    Hematocrit 47.2 40.0 - 53.0 %    MCV 99 78 - 100 fL    MCH 35.4 (H) 26.5 - 33.0 pg    MCHC 35.8 31.5 - 36.5 g/dL    RDW 12.3 10.0 - 15.0 %    Platelet Count 315 150 - 450 10e3/uL   Lipid Profile   Result Value Ref Range    Cholesterol 181 <200 mg/dL    Triglycerides 112 <150 mg/dL    Direct Measure HDL 58 >=40 mg/dL    LDL Cholesterol Calculated 101 (H) <=100 mg/dL    Non HDL Cholesterol 123 <130 mg/dL    Patient Fasting > 8hrs? Unknown     Narrative    Cholesterol  Desirable:  <200 mg/dL    Triglycerides  Normal:  Less than 150 mg/dL  Borderline High:  150-199 mg/dL  High:  200-499 mg/dL  Very High:  Greater than or equal to 500 mg/dL    Direct Measure HDL  Female:  Greater than or equal to 50 mg/dL   Male:  Greater than or equal to 40 mg/dL    LDL Cholesterol  Desirable:  <100mg/dL  Above Desirable:  100-129 mg/dL   Borderline High:  130-159 mg/dL   High:  160-189 mg/dL   Very High:  >= 190 mg/dL    Non HDL Cholesterol  Desirable:  130 mg/dL  Above Desirable:  130-159 mg/dL  Borderline High:  160-189 mg/dL  High:  190-219 mg/dL  Very High:  Greater than or equal to 220 mg/dL   Magnesium   Result Value Ref Range    Magnesium 2.0 1.7 - 2.3 mg/dL   TSH with free T4 reflex   Result Value Ref Range    TSH 0.89 0.30 - 4.20 uIU/mL   Hemoglobin A1c   Result Value Ref Range    Hemoglobin A1C 5.9 4.0 - 6.2 %   Prostate Specific Antigen Screen   Result Value Ref Range    Prostate Specific Antigen Screen 1.08 0.00 - 4.50 ng/mL    Narrative    This result is obtained using the Roche Elecsys total PSA method on the ysabel e601 immunoassay analyzer. Results obtained with different assay methods or kits cannot be used interchangeably.       If you have any questions or concerns, please call the clinic at the number listed above.       Sincerely,      Franca Perez,  DO

## 2024-07-03 NOTE — PROGRESS NOTES
Preventive Care Visit  [facility]  Franca Perez DO, Internal Medicine  Jul 3, 2024      Assessment & Plan     Nicotine dependence, uncomplicated, unspecified nicotine product type  - Greater than 3 minutes were spent on smoking cessation including encouragement to reduce cigarette use and discussion of modalities to assist with this  - cessation was encouraged in order to improve pain,  and long term outcomes  - Brochures/handouts provided  - NRT offered and declined as he has some at home  - SMOKING CESSATION COUNSELING 3-10 MIN  - Prof fee: Shared Decision Making for Lung Cancer Screening  - CT Chest Lung Cancer Scrn Low Dose wo; Future    Screening for hyperlipidemia  -Recheck of cholesterol today shows increased from prior.  Recommend consideration for medications.  - Lipid Profile    Screening for diabetes mellitus  A1c is controlled    Jennings's esophagus without dysplasia  Labs rechecked and overall stable; orders placed for repeat EGD  - Adult GI  Referral - Procedure Only; Future  - Comprehensive metabolic panel  - CBC with platelets    Benign essential hypertension  - Controlled.  Continue current regimen.    - Comprehensive metabolic panel  - Magnesium    Prediabetes  - Controlled.  Continue current regimen.    - TSH with free T4 reflex  - Hemoglobin A1c    Screening PSA (prostate specific antigen)  Stable from prior, continue to monitor  - Prostate Specific Antigen Screen    Screening for colon cancer  - Colonoscopy Screening  Referral; Future    Aneurysm of ascending aorta without rupture (H24)  Unsure if this is accurate.  Obtain screening as below    Personal history of tobacco use  - Prof fee: Shared Decision Making for Lung Cancer Screening  - CT Chest Lung Cancer Scrn Low Dose wo; Future    Screening for AAA (abdominal aortic aneurysm)  - Abdominal Aortic Aneurysm Screening/Tracking  - US Abdominal Aorta Limited; Future    Patient has been advised of split  "billing requirements and indicates understanding: Yes        BMI  Estimated body mass index is 31 kg/m  as calculated from the following:    Height as of this encounter: 1.822 m (5' 11.75\").    Weight as of this encounter: 103 kg (227 lb).   Weight management plan: Discussed healthy diet and exercise guidelines    Counseling  Appropriate preventive services were discussed with this patient, including applicable screening as appropriate for fall prevention, nutrition, physical activity, Tobacco-use cessation, weight loss and cognition.  Checklist reviewing preventive services available has been given to the patient.  Reviewed patient's diet, addressing concerns and/or questions.   Patient is at risk for social isolation and has been provided with information about the benefit of social connection.   The patient's PHQ-9 score is consistent with mild depression. He was provided with information regarding depression.       Return in about 1 year (around 7/3/2025) for Annual Review with renewal of all medications, and as needed sooner.    Gilson Willis is a 59 year old, presenting for the following:  Physical        7/3/2024     8:50 AM   Additional Questions   Roomed by Shay Sanchez LPN       Health Care Directive  Patient does not have a Health Care Directive or Living Will: Discussed advance care planning with patient; information given to patient to review.    HPI    Patient reports overall he is doing okay.  He continues to smoke a few cigarettes daily although this is less than he used to.  He is walking more.  He does have patches at home which he has used in the past.    He is due for some screening tests including colon cancer screening, prostate cancer screening, lung cancer screening, recheck of his blood sugars and cholesterol, recheck of his prior Jennings's disease and AAA screening.  It is mentioned in his chart that he previously was noted to have an aneurysm of the aorta however I am unable to find " this information.    He does have a history of hypertension.  He continues on hydrochlorothiazide for this.  He also continues on medications for mood through mental health.  He denies any obvious side effects.        7/3/2024   General Health   How would you rate your overall physical health? (!) POOR   Feel stress (tense, anxious, or unable to sleep) Rather much      (!) STRESS CONCERN      7/3/2024   Nutrition   Three or more servings of calcium each day? Yes   Diet: Regular (no restrictions)   How many servings of fruit and vegetables per day? (!) 0-1   How many sweetened beverages each day? 0-1            7/3/2024   Exercise   Days per week of moderate/strenous exercise 6 days   Average minutes spent exercising at this level 40 min            7/3/2024   Social Factors   Frequency of gathering with friends or relatives Never   Worry food won't last until get money to buy more No   Food not last or not have enough money for food? No   Do you have housing? (Housing is defined as stable permanent housing and does not include staying ouside in a car, in a tent, in an abandoned building, in an overnight shelter, or couch-surfing.) Yes   Are you worried about losing your housing? Yes   Lack of transportation? No   Unable to get utilities (heat,electricity)? No   Want help with housing or utility concern? No      (!) HOUSING CONCERN PRESENT(!) SOCIAL CONNECTIONS CONCERN      7/3/2024   Fall Risk   Fallen 2 or more times in the past year? No   Trouble with walking or balance? Yes   Gait Speed Test Interpretation Less than or equal to 5.00 seconds - PASS              7/3/2024   Dental   Dentist two times every year? Yes            7/3/2024   TB Screening   Were you born outside of the [place]? No          Today's PHQ-9 Score:       7/3/2024     8:37 AM   PHQ-9 SCORE   PHQ-9 Total Score MyChart 7 (Mild depression)   PHQ-9 Total Score 7         7/3/2024   Substance Use   If I could quit smoking, I would Completely agree   I  want to quit somking, worry about health affects Completely agree   Willing to make a plan to quit smoking Somewhat agree   Willing to cut down before quitting Completely agree   Alcohol more than 3/day or more than 7/wk No   Do you use any other substances recreationally? No        Social History     Tobacco Use    Smoking status: Former     Current packs/day: 0.25     Average packs/day: 1 pack/day for 41.1 years (40.8 ttl pk-yrs)     Types: Cigarettes     Start date: 1/1/1983     Quit date: 10/1/2023    Smokeless tobacco: Never    Tobacco comments:     Trying too quit; only a couple a day, but wants to quit - cant take patch   Vaping Use    Vaping status: Never Used   Substance Use Topics    Alcohol use: Not Currently     Alcohol/week: 12.0 standard drinks of alcohol     Types: 12 Standard drinks or equivalent per week    Drug use: Never           7/3/2024   STI Screening   New sexual partner(s) since last STI/HIV test? No      Last PSA:   Prostate Specific Antigen Screen   Date Value Ref Range Status   07/03/2024 1.08 0.00 - 4.50 ng/mL Final     ASCVD Risk   The 10-year ASCVD risk score (Carolin CARROLL, et al., 2019) is: 9%    Values used to calculate the score:      Age: 59 years      Sex: Male      Is Non- : No      Diabetic: No      Tobacco smoker: No      Systolic Blood Pressure: 128 mmHg      Is BP treated: Yes      HDL Cholesterol: 58 mg/dL      Total Cholesterol: 181 mg/dL         Reviewed and updated as needed this visit by Provider   Tobacco  Allergies  Meds  Problems  Med Hx  Surg Hx  Fam Hx     Sexual Activity          Current Outpatient Medications   Medication Sig Dispense Refill    albuterol (PROAIR HFA/PROVENTIL HFA/VENTOLIN HFA) 108 (90 Base) MCG/ACT inhaler INHALE 1 TO 2 PUFFS INTO THE LUNGS EVERY 4 HOURS AS NEEDED FOR SHORTNESS OF BREATH, DIFFICULT BREATHING OR WHEEZING 18 g 4    buPROPion (WELLBUTRIN XL) 300 MG 24 hr tablet Take 1 tablet (300 mg) by mouth  "every morning 90 tablet 1    hydrochlorothiazide (HYDRODIURIL) 50 MG tablet Take 1 tablet (50 mg) by mouth daily 90 tablet 3    sertraline (ZOLOFT) 100 MG tablet Take 1 tablet (100 mg) by mouth daily 90 tablet 1    vitamin D3 (CHOLECALCIFEROL) 50 mcg (2000 units) tablet Take 1 tablet (50 mcg) by mouth daily 90 tablet 4    zolpidem (AMBIEN) 10 MG tablet Take 1 tablet (10 mg) by mouth nightly as needed for sleep 30 tablet 5       ROS:  CONSTITUTIONAL: Negative for fever, chills, night sweats, significant change in weight  INTEGUMENTARY/SKIN/LYMPH: Negative for worrisome rashes, moles or lesions; swollen lymph nodes  EYES: Negative for significant vision changes or irritation  ENT: Negative for additional ear, mouth and throat problems  RESP: Negative for significant cough or SOB  CV: Negative for chest pain, palpitations or increased peripheral edema  GI: Negative for abdominal pain, or change in bowel habits or blood in the stools/black stools  : Negative for unusual urinary symptoms  MUSCULOSKELETAL: Negative for new or changing joint pain or significant swelling  NEURO: Negative for new headaches, weakness or paraesthesias  PSYCHIATRIC: Negative for changes in mood or affect; significant anxiety or depression       Objective    Exam  /88   Pulse 104   Temp 98.6  F (37  C)   Resp 18   Ht 1.822 m (5' 11.75\")   Wt 103 kg (227 lb)   SpO2 98%   BMI 31.00 kg/m     Estimated body mass index is 31 kg/m  as calculated from the following:    Height as of this encounter: 1.822 m (5' 11.75\").    Weight as of this encounter: 103 kg (227 lb).    Physical Exam  GEN: Vitals reviewed. Healthy appearing. Patient is in no acute distress. Cooperative with exam.  HEENT: Normocephalic atraumatic.  Eyes grossly normal to inspection.  No discharge or erythema, or obvious scleral/conjunctival abnormalities. Oropharynx with no erythema or exudates. Dentition adequate.   NECK: Supple; no thyromegaly or masses noted.  No " cervical or supraclavicular lymphadenopathy.  CV: Heart regular in rate and rhythm with no murmur.    LUNGS: No audible wheeze, cough, or visible cyanosis.  No visible retractions or increased work of breathing.  Lungs clear to auscultation bilaterally.    ABD:  Obese, Nondistended  SKIN: Warm and dry to touch.  Visible skin clear. No significant rash, abnormal pigmentation or lesions.  EXT: No clubbing or cyanosis.  No peripheral edema.  NEURO: Alert and oriented to person, place, and time.  Cranial nerves II-XII grossly intact with no focal or lateralizing deficits.  Muscle tone normal.  Gait normal. No tremor.   MSK: ROM of upper and lower ext symmetric and full.  PSYCH: Mood is good.  Mentation appears normal, affect normal/bright, judgement and insight intact, normal speech and appearance well-groomed.          Signed Electronically by: Franca Perez DO    Answers submitted by the patient for this visit:  Patient Health Questionnaire (Submitted on 7/3/2024)  If you checked off any problems, how difficult have these problems made it for you to do your work, take care of things at home, or get along with other people?: Very difficult  PHQ9 TOTAL SCORE: 7     No

## 2024-07-03 NOTE — PATIENT INSTRUCTIONS
Lung Cancer Screening   Frequently Asked Questions  If you are at high-risk for lung cancer, getting screened with low-dose computed tomography (LDCT) every year can help save your life. This handout offers answers to some of the most common questions about lung cancer screening. If you have other questions, please call [phone] ([phone]).     What is it?  Lung cancer screening uses special X-ray technology to create an image of your lung tissue. The exam is quick and easy and takes less than 10 seconds. We don t give you any medicine or use any needles. You can eat before and after the exam. You don t need to change your clothes as long as the clothing on your chest doesn t contain metal. But, you do need to be able to hold your breath for at least 6 seconds during the exam.    What is the goal of lung cancer screening?  The goal of lung cancer screening is to save lives. Many times, lung cancer is not found until a person starts having physical symptoms. Lung cancer screening can help detect lung cancer in the earliest stages when it may be easier to treat.    Who should be screened for lung cancer?  We suggest lung cancer screening for anyone who is at high-risk for lung cancer. You are in the high-risk group if you:      are between the ages of 55 and 79, and    have smoked at least 1 pack of cigarettes a day for 20 or more years, and    still smoke or have quit within the past 15 years.    However, if you have a new cough or shortness of breath, you should talk to your doctor before being screened.    Why does it matter if I have symptoms?  Certain symptoms can be a sign that you have a condition in your lungs that should be checked and treated by your doctor. These symptoms include fever, chest pain, a new or changing cough, shortness of breath that you have never felt before, coughing up blood or unexplained weight loss. Having any of these symptoms can greatly affect the results of lung  cancer screening.       Should all smokers get an LDCT lung cancer screening exam?  It depends. Lung cancer screening is for a very specific group of men and women who have a history of heavy smoking over a long period of time (see  Who should be screened for lung cancer  above).  I am in the high-risk group, but have been diagnosed with cancer in the past. Is LDCT lung cancer screening right for me?  In some cases, you should not have LDCT lung screening, such as when your doctor is already following your cancer with CT scan studies. Your doctor will help you decide if LDCT lung screening is right for you.  Do I need to have a screening exam every year?  Yes. If you are in the high-risk group described earlier, you should get an LDCT lung cancer screening exam every year until you are 79, or are no longer willing or able to undergo screening and possible procedures to diagnose and treat lung cancer.  How effective is LDCT at preventing death from lung cancer?  Studies have shown that LDCT lung cancer screening can lower the risk of death from lung cancer by 20 percent in people who are at high-risk.  What are the risks?  There are some risks and limitations of LDCT lung cancer screening. We want to make sure you understand the risks and benefits, so please let us know if you have any questions. Your doctor may want to talk with you more about these risks.    Radiation exposure: As with any exam that uses radiation, there is a very small increased risk of cancer. The amount of radiation in LDCT is small--about the same amount a person would get from a mammogram. Your doctor orders the exam when he or she feels the potential benefits outweigh the risks.    False negatives: No test is perfect, including LDCT. It is possible that you may have a medical condition, including lung cancer, that is not found during your exam. This is called a false negative result.    False positives and more testing: LDCT very often finds  something in the lung that could be cancer, but in fact is not. This is called a false positive result. False positive tests often cause anxiety. To make sure these findings are not cancer, you may need to have more tests. These tests will be done only if you give us permission. Sometimes patients need a treatment that can have side effects, such as a biopsy. For more information on false positives, see  What can I expect from the results?     Findings not related to lung cancer: Your LDCT exam also takes pictures of areas of your body next to your lungs. In a very small number of cases, the CT scan will show an abnormal finding in one of these areas, such as your kidneys, adrenal glands, liver or thyroid. This finding may not be serious, but you may need more tests. Your doctor can help you decide what other tests you may need, if any.  What can I expect from the results?  About 1 out of 4 LDCT exams will find something that may need more tests. Most of the time, these findings are lung nodules. Lung nodules are very small collections of tissue in the lung. These nodules are very common, and the vast majority--more than 97 percent--are not cancer (benign). Most are normal lymph nodes or small areas of scarring from past infections.  But, if a small lung nodule is found to be cancer, the cancer can be cured more than 90 percent of the time. To know if the nodule is cancer, we may need to get more images before your next yearly screening exam. If the nodule has suspicious features (for example, it is large, has an odd shape or grows over time), we will refer you to a specialist for further testing.  Will my doctor also get the results?  Yes. Your doctor will get a copy of your results.  Is it okay to keep smoking now that there s a cancer screening exam?  No. Tobacco is one of the strongest cancer-causing agents. It causes not only lung cancer, but other cancers and cardiovascular (heart) diseases as well. The damage  caused by smoking builds over time. This means that the longer you smoke, the higher your risk of disease. While it is never too late to quit, the sooner you quit, the better.  Where can I find help to quit smoking?  The best way to prevent lung cancer is to stop smoking. If you have already quit smoking, congratulations and keep it up! For help on quitting smoking, please call [employer] at 1-763-QUITNOW (1-171.777.7491) or the [employer] at 1-269.532.1063 to find local resources near you.  One-on-one health coaching:  If you d prefer to work individually with a health care provider on tobacco cessation, we offer:      Medication Therapy Management:  Our specially trained pharmacists work closely with you and your doctor to help you quit smoking.  Call [phone] or [phone] (toll free).

## 2024-07-10 ENCOUNTER — TELEPHONE (OUTPATIENT)
Dept: SURGERY | Facility: OTHER | Age: 59
End: 2024-07-10
Payer: MEDICAID

## 2024-07-10 ENCOUNTER — PATIENT OUTREACH (OUTPATIENT)
Dept: GASTROENTEROLOGY | Facility: CLINIC | Age: 59
End: 2024-07-10
Payer: MEDICAID

## 2024-07-10 NOTE — TELEPHONE ENCOUNTER
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  Requesting Dr. Phelps.    Please advise.    Thank you,  Eliana Chery on 7/10/2024 at 2:43 PM

## 2024-07-11 DIAGNOSIS — Z12.11 ENCOUNTER FOR SCREENING COLONOSCOPY: Primary | ICD-10-CM

## 2024-07-11 NOTE — TELEPHONE ENCOUNTER
Screening Questions for the Scheduling of Screening Colonoscopies   (If Colonoscopy is diagnostic, Provider should review the chart before scheduling.)  Are you younger than 45 or older than 80?  NO  Do you take aspirin or fish oil?  NO (if yes, tell patient to stop 1 week prior to Colonoscopy)  Do you take warfarin (Coumadin), clopidogrel (Plavix), apixaban (Eliquis), dabigatram (Pradaxa), rivaroxaban (Xarelto) or any blood thinner? NO  Do you take Ozempic? NO  Do you use oxygen or a CPAP at home?  NO  Do you have kidney disease? NO  Are you on dialysis? NO  Have you had a stroke or heart attack in the last year? NO  Have you had a stent in your heart or any blood vessel in the last year? NO  Have you had a transplant of any organ? NO  Have you had a colonoscopy or upper endoscopy (EGD) before? YES         When?  2019  Date of scheduled EGD & Colonoscopy. 09/23/2024  Provider ALLEN ANDRADE

## 2024-07-12 RX ORDER — POLYETHYLENE GLYCOL 3350, SODIUM CHLORIDE, SODIUM BICARBONATE, POTASSIUM CHLORIDE 420; 11.2; 5.72; 1.48 G/4L; G/4L; G/4L; G/4L
4000 POWDER, FOR SOLUTION ORAL ONCE
Qty: 4000 ML | Refills: 0 | Status: SHIPPED | OUTPATIENT
Start: 2024-09-16 | End: 2024-09-16

## 2024-07-12 RX ORDER — BISACODYL 5 MG/1
TABLET, DELAYED RELEASE ORAL
Qty: 2 TABLET | Refills: 0 | Status: ON HOLD | OUTPATIENT
Start: 2024-09-16 | End: 2024-09-23

## 2024-08-07 ENCOUNTER — HOSPITAL ENCOUNTER (OUTPATIENT)
Dept: ULTRASOUND IMAGING | Facility: OTHER | Age: 59
Discharge: HOME | End: 2024-08-07
Attending: INTERNAL MEDICINE
Payer: MEDICAID

## 2024-08-07 ENCOUNTER — HOSPITAL ENCOUNTER (OUTPATIENT)
Dept: CT IMAGING | Facility: OTHER | Age: 59
Discharge: HOME | End: 2024-08-07
Attending: INTERNAL MEDICINE
Payer: MEDICAID

## 2024-08-07 DIAGNOSIS — Z13.6 SCREENING FOR AAA (ABDOMINAL AORTIC ANEURYSM): ICD-10-CM

## 2024-08-07 DIAGNOSIS — F17.200 NICOTINE DEPENDENCE, UNCOMPLICATED, UNSPECIFIED NICOTINE PRODUCT TYPE: ICD-10-CM

## 2024-08-07 DIAGNOSIS — Z87.891 PERSONAL HISTORY OF TOBACCO USE: ICD-10-CM

## 2024-08-07 PROCEDURE — 71271 CT THORAX LUNG CANCER SCR C-: CPT

## 2024-08-07 PROCEDURE — 76775 US EXAM ABDO BACK WALL LIM: CPT

## 2024-09-12 PROBLEM — Z87.19 HISTORY OF GASTROINTESTINAL ULCER: Status: RESOLVED | Noted: 2023-02-02 | Resolved: 2024-09-12

## 2024-09-12 PROBLEM — Z71.6 TOBACCO ABUSE COUNSELING: Status: RESOLVED | Noted: 2023-02-02 | Resolved: 2024-09-12

## 2024-09-16 DIAGNOSIS — F32.2 SEVERE MAJOR DEPRESSION (H): ICD-10-CM

## 2024-09-16 RX ORDER — BUPROPION HYDROCHLORIDE 300 MG/1
300 TABLET ORAL EVERY MORNING
Qty: 90 TABLET | Refills: 1 | Status: SHIPPED | OUTPATIENT
Start: 2024-09-16

## 2024-09-16 RX ORDER — SERTRALINE HYDROCHLORIDE 100 MG/1
100 TABLET, FILM COATED ORAL DAILY
Qty: 90 TABLET | Refills: 1 | Status: SHIPPED | OUTPATIENT
Start: 2024-09-16

## 2024-09-23 ENCOUNTER — ANESTHESIA (OUTPATIENT)
Dept: SURGERY | Facility: OTHER | Age: 59
End: 2024-09-23
Payer: MEDICAID

## 2024-09-23 ENCOUNTER — ANESTHESIA EVENT (OUTPATIENT)
Dept: SURGERY | Facility: OTHER | Age: 59
End: 2024-09-23
Payer: MEDICAID

## 2024-09-23 ENCOUNTER — HOSPITAL ENCOUNTER (OUTPATIENT)
Facility: OTHER | Age: 59
Discharge: HOME | End: 2024-09-23
Attending: SURGERY | Admitting: SURGERY
Payer: MEDICAID

## 2024-09-23 VITALS
OXYGEN SATURATION: 98 % | HEART RATE: 78 BPM | WEIGHT: 227 LBS | HEIGHT: 71 IN | BODY MASS INDEX: 31.78 KG/M2 | DIASTOLIC BLOOD PRESSURE: 98 MMHG | SYSTOLIC BLOOD PRESSURE: 136 MMHG | RESPIRATION RATE: 16 BRPM | TEMPERATURE: 97.5 F

## 2024-09-23 DIAGNOSIS — K22.70 BARRETT'S ESOPHAGUS WITHOUT DYSPLASIA: Primary | ICD-10-CM

## 2024-09-23 DIAGNOSIS — K29.90 GASTRODUODENITIS: ICD-10-CM

## 2024-09-23 PROCEDURE — 258N000003 HC RX IP 258 OP 636: Performed by: SURGERY

## 2024-09-23 PROCEDURE — 45380 COLONOSCOPY AND BIOPSY: CPT | Mod: PT | Performed by: SURGERY

## 2024-09-23 PROCEDURE — 250N000009 HC RX 250: Performed by: NURSE ANESTHETIST, CERTIFIED REGISTERED

## 2024-09-23 PROCEDURE — 88305 TISSUE EXAM BY PATHOLOGIST: CPT

## 2024-09-23 PROCEDURE — 43239 EGD BIOPSY SINGLE/MULTIPLE: CPT | Mod: 51 | Performed by: SURGERY

## 2024-09-23 PROCEDURE — 45380 COLONOSCOPY AND BIOPSY: CPT | Performed by: NURSE ANESTHETIST, CERTIFIED REGISTERED

## 2024-09-23 PROCEDURE — 999N000010 HC STATISTIC ANES STAT CODE-CRNA PER MINUTE: Performed by: SURGERY

## 2024-09-23 PROCEDURE — 45380 COLONOSCOPY AND BIOPSY: CPT | Performed by: SURGERY

## 2024-09-23 PROCEDURE — 43239 EGD BIOPSY SINGLE/MULTIPLE: CPT | Performed by: SURGERY

## 2024-09-23 PROCEDURE — 250N000011 HC RX IP 250 OP 636: Performed by: NURSE ANESTHETIST, CERTIFIED REGISTERED

## 2024-09-23 RX ORDER — SODIUM CHLORIDE, SODIUM LACTATE, POTASSIUM CHLORIDE, CALCIUM CHLORIDE 600; 310; 30; 20 MG/100ML; MG/100ML; MG/100ML; MG/100ML
INJECTION, SOLUTION INTRAVENOUS CONTINUOUS
Status: DISCONTINUED | OUTPATIENT
Start: 2024-09-23 | End: 2024-09-23 | Stop reason: HOSPADM

## 2024-09-23 RX ORDER — KETAMINE HYDROCHLORIDE 10 MG/ML
INJECTION INTRAMUSCULAR; INTRAVENOUS PRN
Status: DISCONTINUED | OUTPATIENT
Start: 2024-09-23 | End: 2024-09-23

## 2024-09-23 RX ORDER — PROPOFOL 10 MG/ML
INJECTION, EMULSION INTRAVENOUS PRN
Status: DISCONTINUED | OUTPATIENT
Start: 2024-09-23 | End: 2024-09-23

## 2024-09-23 RX ORDER — ONDANSETRON 2 MG/ML
4 INJECTION INTRAMUSCULAR; INTRAVENOUS
Status: DISCONTINUED | OUTPATIENT
Start: 2024-09-23 | End: 2024-09-23 | Stop reason: HOSPADM

## 2024-09-23 RX ORDER — NALOXONE HYDROCHLORIDE 0.4 MG/ML
0.2 INJECTION, SOLUTION INTRAMUSCULAR; INTRAVENOUS; SUBCUTANEOUS
Status: DISCONTINUED | OUTPATIENT
Start: 2024-09-23 | End: 2024-09-23 | Stop reason: HOSPADM

## 2024-09-23 RX ORDER — LIDOCAINE HYDROCHLORIDE 20 MG/ML
INJECTION, SOLUTION INFILTRATION; PERINEURAL PRN
Status: DISCONTINUED | OUTPATIENT
Start: 2024-09-23 | End: 2024-09-23

## 2024-09-23 RX ORDER — NALOXONE HYDROCHLORIDE 0.4 MG/ML
0.4 INJECTION, SOLUTION INTRAMUSCULAR; INTRAVENOUS; SUBCUTANEOUS
Status: DISCONTINUED | OUTPATIENT
Start: 2024-09-23 | End: 2024-09-23 | Stop reason: HOSPADM

## 2024-09-23 RX ORDER — GLYCOPYRROLATE 0.2 MG/ML
INJECTION, SOLUTION INTRAMUSCULAR; INTRAVENOUS PRN
Status: DISCONTINUED | OUTPATIENT
Start: 2024-09-23 | End: 2024-09-23

## 2024-09-23 RX ORDER — PROPOFOL 10 MG/ML
INJECTION, EMULSION INTRAVENOUS CONTINUOUS PRN
Status: DISCONTINUED | OUTPATIENT
Start: 2024-09-23 | End: 2024-09-23

## 2024-09-23 RX ORDER — FLUMAZENIL 0.1 MG/ML
0.2 INJECTION, SOLUTION INTRAVENOUS
Status: DISCONTINUED | OUTPATIENT
Start: 2024-09-23 | End: 2024-09-23 | Stop reason: HOSPADM

## 2024-09-23 RX ORDER — LIDOCAINE 40 MG/G
CREAM TOPICAL
Status: DISCONTINUED | OUTPATIENT
Start: 2024-09-23 | End: 2024-09-23 | Stop reason: HOSPADM

## 2024-09-23 RX ADMIN — PROPOFOL 150 MCG/KG/MIN: 10 INJECTION, EMULSION INTRAVENOUS at 13:39

## 2024-09-23 RX ADMIN — LIDOCAINE HYDROCHLORIDE 40 MG: 20 INJECTION, SOLUTION INFILTRATION; PERINEURAL at 13:39

## 2024-09-23 RX ADMIN — PROPOFOL 100 MG: 10 INJECTION, EMULSION INTRAVENOUS at 13:39

## 2024-09-23 RX ADMIN — Medication 20 MG: at 14:05

## 2024-09-23 RX ADMIN — SODIUM CHLORIDE, SODIUM LACTATE, POTASSIUM CHLORIDE, AND CALCIUM CHLORIDE: 600; 310; 30; 20 INJECTION, SOLUTION INTRAVENOUS at 11:53

## 2024-09-23 RX ADMIN — GLYCOPYRROLATE 0.2 MG: 0.2 INJECTION, SOLUTION INTRAMUSCULAR; INTRAVENOUS at 13:42

## 2024-09-23 RX ADMIN — SODIUM CHLORIDE, SODIUM LACTATE, POTASSIUM CHLORIDE, AND CALCIUM CHLORIDE: 600; 310; 30; 20 INJECTION, SOLUTION INTRAVENOUS at 14:20

## 2024-09-23 RX ADMIN — Medication 30 MG: at 13:42

## 2024-09-23 ASSESSMENT — ACTIVITIES OF DAILY LIVING (ADL)
ADLS_ACUITY_SCORE: 35

## 2024-09-23 NOTE — ANESTHESIA PREPROCEDURE EVALUATION
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, subsequent encounter on 2023    Closed fracture of proximal end of right humerus with routine healing 2018    Closed nondisplaced fracture of proximal phalanx of left little finger     2016    Diverticulosis of sigmoid colon 2016    Fracture of upper end of right humerus with routine healing     2018    Gastroduodenitis 2019    Hypertension 2017      Past Surgical History:   Procedure Laterality Date    COLONOSCOPY  2016,F/U   5 years    COLONOSCOPY N/A 2019    Failed poor prep    COLONOSCOPY N/A 2019    F/U  Advanced serrated adenoma    ESOPHAGOSCOPY, GASTROSCOPY, DUODENOSCOPY (EGD), COMBINED N/A 2019    F/U  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    OTHER SURGICAL HISTORY  2009    Repair of perforated posterior lesser curve gastric ulcer    OTHER SURGICAL HISTORY  2012    Ventral HERNIA REPAIR,with mesh underlay      No Known Allergies   Social History     Tobacco Use    Smoking status: Former     Current packs/day: 0.25     Average packs/day: 1 pack/day for 41.3 years (40.9 ttl pk-yrs)     Types: Cigarettes     Start date: 1983     Quit date: 10/1/2023    Smokeless tobacco: Never    Tobacco comments:     Trying too quit; only a couple a day, but wants to quit - cant take patch   Substance Use Topics    Alcohol use: Not Currently     Alcohol/week: 12.0 standard drinks of alcohol     Types: 12 Standard drinks or equivalent per week      Wt  "Readings from Last 1 Encounters:   07/03/24 103 kg (227 lb)        Anesthesia Evaluation   Pt has had prior anesthetic.         ROS/MED HX  ENT/Pulmonary:     (+) sleep apnea, doesn't use CPAP,                    asthma  Treatment: Inhaler prn,                 Neurologic:       Cardiovascular: Comment: NSR  Had a negative stess echo    (+)  hypertension- -   -  - -                                      METS/Exercise Tolerance: 4 - Raking leaves, gardening    Hematologic:  - neg hematologic  ROS     Musculoskeletal:  - neg musculoskeletal ROS     GI/Hepatic:     (+)        bowel prep,            Renal/Genitourinary:  - neg Renal ROS     Endo:     (+)               Obesity,       Psychiatric/Substance Use:     (+) psychiatric history anxiety and depression       Infectious Disease:  - neg infectious disease ROS     Malignancy:       Other:            Physical Exam    Airway        Mallampati: III   TM distance: > 3 FB   Neck ROM: full   Mouth opening: > 3 cm    Respiratory Devices and Support         Dental       (+) Minor Abnormalities - some fillings, tiny chips      Cardiovascular   cardiovascular exam normal          Pulmonary   pulmonary exam normal                OUTSIDE LABS:  CBC:   Lab Results   Component Value Date    WBC 9.4 07/03/2024    WBC 10.2 01/18/2023    HGB 16.9 07/03/2024    HGB 20.0 (H) 01/18/2023    HCT 47.2 07/03/2024    HCT 57.0 (H) 01/18/2023     07/03/2024     01/18/2023     BMP:   Lab Results   Component Value Date     07/03/2024     (L) 01/18/2023    POTASSIUM 3.6 07/03/2024    POTASSIUM 4.3 01/18/2023    CHLORIDE 95 (L) 07/03/2024    CHLORIDE 98 01/18/2023    CO2 30 (H) 07/03/2024    CO2 27 01/18/2023    BUN 9.8 07/03/2024    BUN 7.4 (L) 01/18/2023    CR 0.69 07/03/2024    CR 0.79 01/18/2023     (H) 07/03/2024     (H) 01/18/2023     COAGS: No results found for: \"PTT\", \"INR\", \"FIBR\"  POC: No results found for: \"BGM\", \"HCG\", \"HCGS\"  HEPATIC:   Lab " Results   Component Value Date    ALBUMIN 4.5 07/03/2024    PROTTOTAL 7.7 07/03/2024    ALT 21 07/03/2024    AST 23 07/03/2024    ALKPHOS 74 07/03/2024    BILITOTAL 0.3 07/03/2024     OTHER:   Lab Results   Component Value Date    A1C 5.9 07/03/2024    FAUSTO 9.9 07/03/2024    MAG 2.0 07/03/2024    TSH 0.89 07/03/2024       Anesthesia Plan    ASA Status:  3    NPO Status:  NPO Appropriate    Anesthesia Type: MAC.              Consents    Anesthesia Plan(s) and associated risks, benefits, and realistic alternatives discussed. Questions answered and patient/representative(s) expressed understanding.     - Discussed: Risks, Benefits and Alternatives for BOTH SEDATION and the PROCEDURE were discussed     - Discussed with:  Patient            Postoperative Care            Comments:               ANTONIETA PACHECO CRNA    I have reviewed the pertinent notes and labs in the chart from the past 30 days and (re)examined the patient.  Any updates or changes from those notes are reflected in this note.

## 2024-09-23 NOTE — OR NURSING
Pt tolerated fluids without nausea. Declines wanting food. Steady on feet, IV removed. AVS reviewed in detail with pt. Ambulated out with this RN.

## 2024-09-23 NOTE — H&P
History and Physical    CHIEF COMPLAINT / REASON FOR PROCEDURE:  h/o polyps and Jennings's    PERTINENT HISTORY   Patient is a 59 year old male who presents today for screening colonoscopy for h/o polyps and upper endoscopy for Jennings's surveillance.   Last EGD/Colonoscopy 2019.  Patient has no complaints.    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 side with nonunion, subsequent encounter on 7/31/2017 02/02/2023    Closed fracture of proximal end of right humerus with routine healing 01/11/2018    Closed nondisplaced fracture of proximal phalanx of left little finger     12/13/2016    Diverticulosis of sigmoid colon 04/04/2016    Fracture of upper end of right humerus with routine healing     1/11/2018    Gastroduodenitis 07/29/2019    Hypertension 06/20/2017     Past Surgical History:   Procedure Laterality Date    COLONOSCOPY  04/04/2016 4/4/16,F/U 2021  5 years    COLONOSCOPY N/A 07/29/2019    Failed poor prep    COLONOSCOPY N/A 08/26/2019    F/U 2022 Advanced serrated adenoma    ESOPHAGOSCOPY, GASTROSCOPY, DUODENOSCOPY (EGD), COMBINED N/A 07/29/2019    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    OTHER SURGICAL HISTORY  07/11/2009    Repair of perforated posterior lesser curve gastric ulcer    OTHER SURGICAL HISTORY  01/31/2012    Ventral HERNIA REPAIR,with mesh underlay       Bleeding tendencies:  No    ALLERGIES/SENSITIVITIES: No Known Allergies     CURRENT MEDICATIONS:    Prior to Admission medications    Medication Sig Start Date End Date Taking? Authorizing Provider   albuterol (PROAIR HFA/PROVENTIL HFA/VENTOLIN HFA) 108 (90 Base) MCG/ACT inhaler INHALE 1 TO 2 PUFFS INTO THE LUNGS EVERY 4 HOURS AS NEEDED FOR SHORTNESS OF BREATH, DIFFICULT BREATHING OR WHEEZING 6/10/24  Yes Franca Perez, DO   buPROPion  "(WELLBUTRIN XL) 300 MG 24 hr tablet TAKE 1 TABLET(300 MG) BY MOUTH EVERY MORNING 9/16/24  Yes Jatin Mason MD   hydrochlorothiazide (HYDRODIURIL) 50 MG tablet Take 1 tablet (50 mg) by mouth daily 2/15/24  Yes Moi Jack DO   sertraline (ZOLOFT) 100 MG tablet TAKE 1 TABLET(100 MG) BY MOUTH DAILY 9/16/24  Yes Jatin Mason MD   vitamin D3 (CHOLECALCIFEROL) 50 mcg (2000 units) tablet Take 1 tablet (50 mcg) by mouth daily 1/20/23  Yes Alyse Mcpherson APRN CNP   zolpidem (AMBIEN) 10 MG tablet Take 1 tablet (10 mg) by mouth nightly as needed for sleep 6/25/24  Yes Jatin Mason MD       Physical Exam:  Temp 97.5  F (36.4  C) (Tympanic)   Resp 16   Ht 1.803 m (5' 11\")   Wt 103 kg (227 lb)   BMI 31.66 kg/m    EXAM:  Chest/Respiratory Exam: Normal - Clear to auscultation without rales, rhonchi, or wheezing.  Cardiovascular Exam: regular rate and rhythm        PLAN: COLONOSCOPY .  Patient understands risks of bleeding, perforation, potential inability to reach cecum, aspiration and wishes to proceed. MAC needed for ASA III.       "

## 2024-09-23 NOTE — OP NOTE
PROCEDURE NOTE    DATE OF SERVICE: 9/23/2024    SURGEON: Louie Phelps MD    PRE-OP DIAGNOSIS:    Screening  History of Polyps  Jennings's esophagus      POST-OP DIAGNOSIS:  Same  Gastroduodenitis  Sigmoid Diverticulosis  Polyps at TC, SF, 40 cm, 25 cm and rectum    PROCEDURE:   EGD/Colonoscopy with cold biopsy    ANESTHESIA:  MACIE Marlow CRNA    INDICATION FOR THE PROCEDURE: The patient is a 59 year old male with Jennings's and h/o polyps . The patient has no other complaints  . After explaining the risks to include bleeding, perforation, potential inability toreach the cecum, the patient wished to proceed.    PROCEDURE:After adequate sedation, the patient was in the left lateral decubitus position.       The esophagoscope was passed under direct vision into the esophagus and onto the second portion of the duodenum.  The duodenum was erythematous and biopsied.  The antrum was granular and erythematous.  Biopsies were taken from the antrum to look for occult H. Pylori.  The scope was unable to be retroflexed.  The GE junction and fundus were unremarkable .  Scope was straightened and pulled back.  The distal esophagus was with irregular z-line  .  Biopsies x 8 were taken from the distal esophagus.  The remaining esophagus was unremarkable . The scope was removed.       Rectal exam was performed.  There was normal tone and no palpable masses .  The colonoscope was introduced into the rectum and advanced to the cecum with Moderate difficulty.  The patient's prep was fair.  The terminal cecum was reached.  The cecum, ascending, transverse, descending and sigmoid colon was with sigmoid diverticulosis. Small 0.4 to diminutive polyps at TC, SF, 40 cm, 25 cm and rectum were removed by Jumbo forceps .  The scope was retroflexed in the rectum.  The rectum was otherwise unremarkable  .  The scope was straightened and removed.  The patient tolerated the procedure well.     ESTIMATED BLOOD LOSS: none    COMPLICATIONS:   None    TISSUE REMOVED:  Yes    RECOMMEND:    PPI  Follow-up pending pathology  Fiber  Given literature on diverticulosis    Louie Phelps MD FACS

## 2024-09-23 NOTE — ANESTHESIA CARE TRANSFER NOTE
Patient: Lazaro Man    Procedure: Procedure(s):  COLONOSCOPY, WITH POLYPECTOMY  ESOPHAGOGASTRODUODENOSCOPY, WITH BIOPSY       Diagnosis: Screen for colon cancer [Z12.11]  Jennings esophagus [K22.70]  Diagnosis Additional Information: No value filed.    Anesthesia Type:   MAC     Note:    Oropharynx: oropharynx clear of all foreign objects  Level of Consciousness: awake  Oxygen Supplementation: room air    Independent Airway: airway patency satisfactory and stable  Dentition: dentition unchanged  Vital Signs Stable: post-procedure vital signs reviewed and stable  Report to RN Given: handoff report given  Patient transferred to: Phase II    Handoff Report: Identifed the Patient, Identified the Reponsible Provider, Reviewed the pertinent medical history, Discussed the surgical course, Reviewed Intra-OP anesthesia mangement and issues during anesthesia, Set expectations for post-procedure period and Allowed opportunity for questions and acknowledgement of understanding      Vitals:  Vitals Value Taken Time   BP     Temp     Pulse     Resp     SpO2 97 % 09/23/24 1440   Vitals shown include unfiled device data.    Electronically Signed By: ANTONIETA ALONSO CRNA  September 23, 2024  2:41 PM

## 2024-09-23 NOTE — DISCHARGE INSTRUCTIONS
[facility]  Adult Discharge Orders & Instructions    ________________________________________________________________          For 12 hours after surgery  Get plenty of rest.  A responsible adult must stay with you for at least 12 hours after you leave the hospital.   You may feel lightheaded.  IF so, sit for a few minutes before standing.  Have someone help you get up.   You may have a slight fever. Call the doctor if your fever is over 101 F (38.3 C) (taken under the tongue) or lasts longer than 24 hours.  You may have a dry mouth, a sore throat, muscle aches or trouble sleeping.  These should go away after 24 hours.  Do not make important or legal decisions.  6.   Do not drive or use heavy equipment.  If you have weakness or tingling, don't drive or use heavy equipment until this feeling goes away.    To contact a doctor, call   [phone]_______________________

## 2024-09-23 NOTE — ANESTHESIA POSTPROCEDURE EVALUATION
Patient: Lazaro Man    Procedure: Procedure(s):  COLONOSCOPY, WITH POLYPECTOMY  ESOPHAGOGASTRODUODENOSCOPY, WITH BIOPSY       Anesthesia Type:  MAC    Note:  Disposition: Outpatient   Postop Pain Control: Uneventful            Sign Out: Well controlled pain   PONV: No   Neuro/Psych: Uneventful            Sign Out: Acceptable/Baseline neuro status   Airway/Respiratory: Uneventful            Sign Out: Acceptable/Baseline resp. status   CV/Hemodynamics: Uneventful            Sign Out: Acceptable CV status; No obvious hypovolemia; No obvious fluid overload   Other NRE: NONE   DID A NON-ROUTINE EVENT OCCUR?            Last vitals:  Vitals Value Taken Time   /98 09/23/24 1515   Temp     Pulse 78 09/23/24 1515   Resp     SpO2 98 % 09/23/24 1519   Vitals shown include unfiled device data.    Electronically Signed By: ANTONIETA PACHECO CRNA  September 23, 2024  3:34 PM

## 2024-09-26 PROBLEM — K29.90 GASTRODUODENITIS: Status: RESOLVED | Noted: 2019-07-29 | Resolved: 2024-09-26

## 2024-09-26 PROBLEM — K63.5 COLON POLYPS: Status: RESOLVED | Noted: 2019-08-26 | Resolved: 2024-09-26

## 2024-09-26 LAB
PATH REPORT.COMMENTS IMP SPEC: NORMAL
PATH REPORT.FINAL DX SPEC: NORMAL
PATH REPORT.RELEVANT HX SPEC: NORMAL
PHOTO IMAGE: NORMAL

## 2024-10-01 ENCOUNTER — OFFICE VISIT (OUTPATIENT)
Dept: PSYCHIATRY | Facility: OTHER | Age: 59
End: 2024-10-01
Attending: PSYCHIATRY & NEUROLOGY
Payer: MEDICAID

## 2024-10-01 VITALS
OXYGEN SATURATION: 99 % | WEIGHT: 231.8 LBS | BODY MASS INDEX: 32.45 KG/M2 | DIASTOLIC BLOOD PRESSURE: 101 MMHG | TEMPERATURE: 97 F | RESPIRATION RATE: 16 BRPM | HEIGHT: 71 IN | HEART RATE: 83 BPM | SYSTOLIC BLOOD PRESSURE: 150 MMHG

## 2024-10-01 DIAGNOSIS — F17.200 CONTINUOUS NICOTINE DEPENDENCE: Primary | ICD-10-CM

## 2024-10-01 PROCEDURE — 99215 OFFICE O/P EST HI 40 MIN: CPT | Performed by: PSYCHIATRY & NEUROLOGY

## 2024-10-01 PROCEDURE — G0463 HOSPITAL OUTPT CLINIC VISIT: HCPCS

## 2024-10-01 RX ORDER — NICOTINE 21 MG/24HR
1 PATCH, TRANSDERMAL 24 HOURS TRANSDERMAL EVERY 24 HOURS
Qty: 28 PATCH | Refills: 2 | Status: SHIPPED | OUTPATIENT
Start: 2024-10-01

## 2024-10-01 ASSESSMENT — ANXIETY QUESTIONNAIRES
7. FEELING AFRAID AS IF SOMETHING AWFUL MIGHT HAPPEN: MORE THAN HALF THE DAYS
4. TROUBLE RELAXING: SEVERAL DAYS
3. WORRYING TOO MUCH ABOUT DIFFERENT THINGS: MORE THAN HALF THE DAYS
5. BEING SO RESTLESS THAT IT IS HARD TO SIT STILL: NOT AT ALL
8. IF YOU CHECKED OFF ANY PROBLEMS, HOW DIFFICULT HAVE THESE MADE IT FOR YOU TO DO YOUR WORK, TAKE CARE OF THINGS AT HOME, OR GET ALONG WITH OTHER PEOPLE?: VERY DIFFICULT
GAD7 TOTAL SCORE: 10
2. NOT BEING ABLE TO STOP OR CONTROL WORRYING: MORE THAN HALF THE DAYS
GAD7 TOTAL SCORE: 10
6. BECOMING EASILY ANNOYED OR IRRITABLE: MORE THAN HALF THE DAYS
1. FEELING NERVOUS, ANXIOUS, OR ON EDGE: SEVERAL DAYS
7. FEELING AFRAID AS IF SOMETHING AWFUL MIGHT HAPPEN: MORE THAN HALF THE DAYS
GAD7 TOTAL SCORE: 10
IF YOU CHECKED OFF ANY PROBLEMS ON THIS QUESTIONNAIRE, HOW DIFFICULT HAVE THESE PROBLEMS MADE IT FOR YOU TO DO YOUR WORK, TAKE CARE OF THINGS AT HOME, OR GET ALONG WITH OTHER PEOPLE: VERY DIFFICULT

## 2024-10-01 ASSESSMENT — PAIN SCALES - GENERAL: PAINLEVEL: NO PAIN (0)

## 2024-10-01 ASSESSMENT — PAIN SCALES - PAIN ENJOYMENT GENERAL ACTIVITY SCALE (PEG)
INTERFERED_GENERAL_ACTIVITY: 2
INTERFERED_GENERAL_ACTIVITY: 2
PEG_TOTALSCORE: INCOMPLETE
AVG_PAIN_PASTWEEK: 2
AVG_PAIN_PASTWEEK: 2

## 2024-10-01 ASSESSMENT — PATIENT HEALTH QUESTIONNAIRE - PHQ9
SUM OF ALL RESPONSES TO PHQ QUESTIONS 1-9: 6
10. IF YOU CHECKED OFF ANY PROBLEMS, HOW DIFFICULT HAVE THESE PROBLEMS MADE IT FOR YOU TO DO YOUR WORK, TAKE CARE OF THINGS AT HOME, OR GET ALONG WITH OTHER PEOPLE: VERY DIFFICULT
SUM OF ALL RESPONSES TO PHQ QUESTIONS 1-9: 6

## 2024-10-01 NOTE — PROGRESS NOTES
"Psychiatric Progress Note/Visit  October 1, 2024    Identifying Data:  This is a 59-year-old man seen for follow-up psychiatric medication management visit for treatment of depression, anxiety and insomnia    Interval History:  He was seen most recently on June 25, 2024.  At that time he reported ongoing progress and good results from Ambien as needed, using it only about 2-3 times a week.  We had a long discussion about challenges in his living situation and working on planning more and worrying less.  He also noted that he was smoking only about 3-4 cigarettes a day.  We did not make any medication changes.    Today he reports that he continues to make progress and said \"I get more down\".  He has been doing cleaning in his house, yard work and organizing.  He is also doing the [employer] exercises daily and finds this helpful.  He started using nicotine patches that he had had from a previous prescription and expresses a desire to continue using this to help with his desire for smoking cessation.    Mental Status Exam:  Anxiety has decreased and is considered minimal to absent.  Depression remains minimal to absent.  Remainder of MSE is essentially unchanged from June 25, 2024.    Current Psychiatric Medications:  Wellbutrin  mg a day  Zoloft 100 mg a day  Ambien 10-20 mg as needed at bedtime  Nicotine patches 28 mg a day    Lab Tests/Results:  Laboratory studies were not ordered previously and none are being ordered today.    Diagnosis:  Major depression, recurrent, severe without psychosis  Anxiety, unspecified  Insomnia  Nicotine dependence  Rule out PTSD    Impression/Assessment:  Lazaro is making more progress in dealing with situational stressors in his life.  He is getting things done in his living situation and has started regular physical exercise.  He is finding it difficult to stop smoking cigarettes and restarted using nicotine patches that had been prescribed previously.  I informed " him that I am giving him a prescription to get started on this, but I would prefer that he continue to monitor this with his primary care provider.    Plan:  Nicotine patches 28 mg a day for 4 weeks for nicotine dependence (he will continue to address this with his primary care provider)  Continue Wellbutrin  mg a day for depression  Continue Zoloft 100 mg a day for depression and anxiety  Continue Ambien 10-20 mg as needed at bedtime for insomnia  Follow-up with Dr. Mason in 3 months    Time spent on day of visit 43 minutes - 7 minutes (7:56 AM through 8:03 AM) review of EMR prior to visit, 25 minutes (9:04 AM to 9:29 AM) face-to-face meeting with patient, including discussion of risk/benefits, alternatives and possible side effects to medication, counseling on above issues, and prescription of medications in the EMR, 11 minutes (9:29 AM through 9:40 AM) documentation in the EMR      Jatin Mason MD    Answers submitted by the patient for this visit:  Patient Health Questionnaire (Submitted on 10/1/2024)  If you checked off any problems, how difficult have these problems made it for you to do your work, take care of things at home, or get along with other people?: Very difficult  PHQ9 TOTAL SCORE: 6  Patient Health Questionnaire (G7) (Submitted on 10/1/2024)  SHANA 7 TOTAL SCORE: 10

## 2024-10-01 NOTE — NURSING NOTE
"Chief Complaint   Patient presents with    Medication Therapy Management       Initial BP (!) 150/101 (BP Location: Left arm, Patient Position: Sitting, Cuff Size: Adult Large)   Pulse 83   Temp 97  F (36.1  C) (Tympanic)   Resp 16   Ht 1.803 m (5' 11\")   Wt 105.1 kg (231 lb 12.8 oz)   SpO2 99%   BMI 32.33 kg/m   Estimated body mass index is 32.33 kg/m  as calculated from the following:    Height as of this encounter: 1.803 m (5' 11\").    Weight as of this encounter: 105.1 kg (231 lb 12.8 oz).  Medication Review: complete    The next two questions are to help us understand your food security.  If you are feeling you need any assistance in this area, we have resources available to support you today.          7/3/2024   SDOH- Food Insecurity   Within the past 12 months, did you worry that your food would run out before you got money to buy more? N   Within the past 12 months, did the food you bought just not last and you didn t have money to get more? N                Bassam Briones      "

## 2025-02-11 DIAGNOSIS — R06.09 DOE (DYSPNEA ON EXERTION): ICD-10-CM

## 2025-02-11 DIAGNOSIS — I10 BENIGN ESSENTIAL HYPERTENSION: ICD-10-CM

## 2025-02-11 RX ORDER — HYDROCHLOROTHIAZIDE 50 MG/1
50 TABLET ORAL DAILY
Qty: 90 TABLET | Refills: 3 | Status: SHIPPED | OUTPATIENT
Start: 2025-02-11

## 2025-02-11 NOTE — TELEPHONE ENCOUNTER
"Requested Prescriptions   Pending Prescriptions Disp Refills    hydrochlorothiazide (HYDRODIURIL) 50 MG tablet [Pharmacy Med Name: HYDROCHLOROTHIAZIDE 50MG TABLETS] 90 tablet 3     Sig: TAKE 1 TABLET(50 MG) BY MOUTH DAILY       Diuretics (Including Combos) Protocol Failed - 2/11/2025  1:47 PM        Failed - Most recent blood pressure under 140/90 in past 12 months     BP Readings from Last 3 Encounters:   10/01/24 (!) 150/101   09/23/24 (!) 136/98   07/03/24 128/88   No data recorded        Failed - Recent (12 mo) or future (90 days) visit within the authorizing provider's specialty     The patient must have completed an in-person or virtual visit within the past 12 months or has a future visit scheduled within the next 90 days with the authorizing provider s specialty.  Urgent care and e-visits do not qualify as an office visit for this protocol.        Passed - Potassium level on file in past 12 months        Passed - Medication is active on med list        Passed - Medication indicated for associated diagnosis     Medication is associated with one or more of the following diagnoses:     Edema   Hypertension   Heart Failure   Meniere's Disease   Bilateral localized swelling of lower limbs   Pulmonary Hypertension        Passed - Has GFR on file in past 12 months and most recent value is normal        Passed - Patient is age 18 or older     Last Written Prescription Date:  2/15/24  Last Fill Quantity: 90,   # refills: 3    Last Office Visit with Dr. Jack: 2/2/23 and note states: \"We will follow-up in the future on an as-needed basis.\"  Should go to PCP to review and advise.    Last office visit with Dr. Perez: 7/3/24    Future Office visit:   none    Routing refill request to provider for review/approval because:  Blood pressure out of range     Unable to complete prescription refill per RN Medication Refill Policy.   Ilene Adkins RN ....................  2/11/2025   1:50 PM    "

## 2025-03-10 ENCOUNTER — OFFICE VISIT (OUTPATIENT)
Dept: PSYCHIATRY | Facility: OTHER | Age: 60
End: 2025-03-10
Attending: PSYCHIATRY & NEUROLOGY
Payer: MEDICAID

## 2025-03-10 VITALS
RESPIRATION RATE: 16 BRPM | WEIGHT: 251.1 LBS | BODY MASS INDEX: 35.02 KG/M2 | DIASTOLIC BLOOD PRESSURE: 116 MMHG | TEMPERATURE: 97.6 F | OXYGEN SATURATION: 96 % | HEART RATE: 87 BPM | SYSTOLIC BLOOD PRESSURE: 184 MMHG

## 2025-03-10 DIAGNOSIS — G47.00 INSOMNIA, UNSPECIFIED TYPE: ICD-10-CM

## 2025-03-10 DIAGNOSIS — F32.2 SEVERE MAJOR DEPRESSION (H): ICD-10-CM

## 2025-03-10 PROCEDURE — G0463 HOSPITAL OUTPT CLINIC VISIT: HCPCS

## 2025-03-10 PROCEDURE — 3077F SYST BP >= 140 MM HG: CPT | Performed by: PSYCHIATRY & NEUROLOGY

## 2025-03-10 PROCEDURE — 99214 OFFICE O/P EST MOD 30 MIN: CPT | Performed by: PSYCHIATRY & NEUROLOGY

## 2025-03-10 PROCEDURE — 1125F AMNT PAIN NOTED PAIN PRSNT: CPT | Performed by: PSYCHIATRY & NEUROLOGY

## 2025-03-10 PROCEDURE — 3080F DIAST BP >= 90 MM HG: CPT | Performed by: PSYCHIATRY & NEUROLOGY

## 2025-03-10 RX ORDER — BUPROPION HYDROCHLORIDE 300 MG/1
300 TABLET ORAL EVERY MORNING
Qty: 90 TABLET | Refills: 1 | Status: SHIPPED | OUTPATIENT
Start: 2025-03-10

## 2025-03-10 RX ORDER — ZOLPIDEM TARTRATE 10 MG/1
10 TABLET ORAL
Qty: 30 TABLET | Refills: 5 | Status: SHIPPED | OUTPATIENT
Start: 2025-03-10

## 2025-03-10 RX ORDER — SERTRALINE HYDROCHLORIDE 100 MG/1
100 TABLET, FILM COATED ORAL DAILY
Qty: 90 TABLET | Refills: 1 | Status: SHIPPED | OUTPATIENT
Start: 2025-03-10

## 2025-03-10 ASSESSMENT — ANXIETY QUESTIONNAIRES
5. BEING SO RESTLESS THAT IT IS HARD TO SIT STILL: NOT AT ALL
3. WORRYING TOO MUCH ABOUT DIFFERENT THINGS: MORE THAN HALF THE DAYS
7. FEELING AFRAID AS IF SOMETHING AWFUL MIGHT HAPPEN: MORE THAN HALF THE DAYS
1. FEELING NERVOUS, ANXIOUS, OR ON EDGE: SEVERAL DAYS
GAD7 TOTAL SCORE: 7
2. NOT BEING ABLE TO STOP OR CONTROL WORRYING: SEVERAL DAYS
GAD7 TOTAL SCORE: 7
GAD7 TOTAL SCORE: 7
6. BECOMING EASILY ANNOYED OR IRRITABLE: SEVERAL DAYS
IF YOU CHECKED OFF ANY PROBLEMS ON THIS QUESTIONNAIRE, HOW DIFFICULT HAVE THESE PROBLEMS MADE IT FOR YOU TO DO YOUR WORK, TAKE CARE OF THINGS AT HOME, OR GET ALONG WITH OTHER PEOPLE: VERY DIFFICULT
8. IF YOU CHECKED OFF ANY PROBLEMS, HOW DIFFICULT HAVE THESE MADE IT FOR YOU TO DO YOUR WORK, TAKE CARE OF THINGS AT HOME, OR GET ALONG WITH OTHER PEOPLE?: VERY DIFFICULT
7. FEELING AFRAID AS IF SOMETHING AWFUL MIGHT HAPPEN: MORE THAN HALF THE DAYS
4. TROUBLE RELAXING: NOT AT ALL

## 2025-03-10 ASSESSMENT — PAIN SCALES - GENERAL: PAINLEVEL_OUTOF10: MILD PAIN (3)

## 2025-03-10 ASSESSMENT — PATIENT HEALTH QUESTIONNAIRE - PHQ9
10. IF YOU CHECKED OFF ANY PROBLEMS, HOW DIFFICULT HAVE THESE PROBLEMS MADE IT FOR YOU TO DO YOUR WORK, TAKE CARE OF THINGS AT HOME, OR GET ALONG WITH OTHER PEOPLE: VERY DIFFICULT
SUM OF ALL RESPONSES TO PHQ QUESTIONS 1-9: 7
SUM OF ALL RESPONSES TO PHQ QUESTIONS 1-9: 7

## 2025-03-10 NOTE — PROGRESS NOTES
Psychiatric Progress Note/Visit  March 10, 2025    Identifying Data:  This is a 59-year-old man seen for psychiatric medication management treatment of depression, anxiety and insomnia    Interval History:  He was seen most recently October 1, 2024.  At that time he was making progress, even though he still felt down at times.  He was working on taking care of his house, his yard and organizing.  He also started exercising and felt that that was helpful.  He had restarted nicotine patches to try to help with smoking cessation.    Today he reports that he is doing reasonably well, even though he gained about 20 pounds in the past 3 months.  He says that he had a significantly increased appetite for a while, but now it is back to normal.  He is dealing with some significant situational stressors related to needing to go on early social security care home instead of disability.  He says that he is dealing with his problems directly now, instead of avoiding them.    Mental Status Exam:  Anxiety remains minimal to absent.  Depression also remains minimal to absent.  MSE is essentially unchanged from October 1, 2024.    Current Psychiatric Medications:  Wellbutrin  mg a day  Zoloft 100 mg a day  Ambien 10-20 mg as needed at bedtime    Lab Tests/Results:  Laboratory studies were not ordered previously and none are being ordered today    Diagnosis:  Major depression, recurrent, severe without psychosis  Anxiety, unspecified  Insomnia  History of vitamin D deficiency  Rule out PTSD    Impression/Assessment:  Lazaro has been dealing with significant situational stressors recently and is taking care of these directly, as opposed to avoidance as in the past.  He continues to respond well to his psychiatric medications without adverse effects.    Plan:  Continue Wellbutrin  mg a day for depression  Continue Zoloft 100 mg a day for depression and anxiety  Continue Ambien 10-20 mg as needed at bedtime for  insomnia  Follow-up with Dr. Mason in 3 months    Time spent on day of visit 51 minutes -10 minutes (7:12 AM through 7:22 AM) review of EMR prior to visit, 32 minutes (10:49 AM through 11:21 AM) face-to-face meeting with patient, including discussion of risk/benefits, alternatives and possible side effects to medication, counseling on above issues, and prescription of medications in the EMR, 9 minutes (11:21 AM through 11:30 AM) documentation in the EMR      Jatin Mason MD    Answers submitted by the patient for this visit:  Patient Health Questionnaire (Submitted on 3/10/2025)  If you checked off any problems, how difficult have these problems made it for you to do your work, take care of things at home, or get along with other people?: Very difficult  PHQ9 TOTAL SCORE: 7  Patient Health Questionnaire (G7) (Submitted on 3/10/2025)  SHANA 7 TOTAL SCORE: 7

## 2025-04-10 DIAGNOSIS — R06.02 SHORTNESS OF BREATH: ICD-10-CM

## 2025-04-14 RX ORDER — ALBUTEROL SULFATE 90 UG/1
INHALANT RESPIRATORY (INHALATION)
Qty: 18 G | Refills: 4 | Status: SHIPPED | OUTPATIENT
Start: 2025-04-14

## 2025-04-14 NOTE — TELEPHONE ENCOUNTER
Requested Prescriptions   Pending Prescriptions Disp Refills    albuterol (VENTOLIN HFA) 108 (90 Base) MCG/ACT inhaler [Pharmacy Med Name: VENTOLIN HFA INH W/DOS CTR 200PUFFS] 18 g 4     Sig: INHALE 1 TO 2 PUFFS INTO THE LUNGS EVERY 4 HOURS AS NEEDED FOR SHORTNESS OF BREATH OR DIFFICULT BREATHING OR WHEEZING       Short-Acting Beta Agonist Inhalers Protocol  Failed - 4/14/2025  4:22 PM        Failed - Medication is active on med list and the sig matches. RN to manually verify dose and sig if red X/fail.     If the protocol passes (green check), you do not need to verify med dose and sig.    A prescription matches if they are the same clinical intention.    For Example: once daily and every morning are the same.    The protocol can not identify upper and lower case letters as matching and will fail.     For Example: Take 1 tablet (50 mg) by mouth daily     TAKE 1 TABLET (50 MG) BY MOUTH DAILY    For all fails (red x), verify dose and sig.    If the refill does match what is on file, the RN can still proceed to approve the refill request.       If they do not match, route to the appropriate provider.             Passed - Patient is age 12 or older        Passed - Recent (12 mo) or future (90 days) visit within the authorizing provider's specialty     The patient must have completed an in-person or virtual visit within the past 12 months or has a future visit scheduled within the next 90 days with the authorizing provider s specialty.  Urgent care and e-visits do not qualify as an office visit for this protocol.             Last Prescription Date:   6/10/2024  Last Fill Qty/Refills:         18 g, R-4     LOV: 7/3/2024  Future Office visit:    Next 5 appointments (look out 90 days)      Jul 03, 2025 9:20 AM  (Arrive by 9:05 AM)  Adult Preventative Visit with Franca Perez DO  [facility] ([facility]) [address]  [place]  [address]  [phone]           Overdue          Never done ZOSTER IMMUNIZATION (1 of 2)     MAR 8  2016 Pneumococcal Vaccine: 50+ Years (2 of 2 - PPSV23)  Last completed: Jan 12, 2016 JAN 18 2024 NICOTINE/TOBACCO CESSATION COUNSELING Q 1 YR (Yearly)  Last completed: Jan 18, 2023     SEP 1  2024 INFLUENZA VACCINE (1)  Last completed: Oct 30, 2019     SEP 1  2024 COVID-19 Vaccine (3 - 2024-25 season)  Last completed: Rusty 10, 2022         Upcoming          JUL 3  2025 YEARLY PREVENTIVE VISIT (Yearly)   Scheduled for: Jul 3, 2025         Routing refill request to provider for review/approval.    Radha Langford RN  ....................  4/14/2025   4:22 PM

## 2025-07-27 DIAGNOSIS — K29.90 GASTRODUODENITIS: ICD-10-CM

## 2025-07-27 DIAGNOSIS — K22.70 BARRETT'S ESOPHAGUS WITHOUT DYSPLASIA: ICD-10-CM

## 2025-07-28 NOTE — TELEPHONE ENCOUNTER
[facility] sent Rx request for the following:      Requested Prescriptions   Pending Prescriptions Disp Refills    omeprazole (PRILOSEC) 20 MG DR capsule [Pharmacy Med Name: OMEPRAZOLE 20MG CAPSULES] 90 capsule 3     Sig: TAKE 1 CAPSULE(20 MG) BY MOUTH DAILY       PPI Protocol Failed - 7/28/2025  9:34 AM        Failed - Recent (12 month) or future (90 days) visit with authorizing provider's specialty (provided they have been seen in the past 15 months)     The patient must have completed an in-person or virtual visit within the past 12 months or has a future visit scheduled within the next 90 days with the authorizing provider s specialty.  Urgent care and e-visits do not qualify as an office visit for this protocol.          Passed - Medication is active on med list and the sig matches. RN to manually verify dose and sig if red X/fail.     If the protocol passes (green check), you do not need to verify med dose and sig.    A prescription matches if they are the same clinical intention.    For Example: once daily and every morning are the same.    The protocol can not identify upper and lower case letters as matching and will fail.     For Example: Take 1 tablet (50 mg) by mouth daily     TAKE 1 TABLET (50 MG) BY MOUTH DAILY    For all fails (red x), verify dose and sig.    If the refill does match what is on file, the RN can still proceed to approve the refill request.       If they do not match, route to the appropriate provider.             Passed - Medication indicated for associated diagnosis     The medication is prescribed for one or more of the following conditions:     Erosive esophagitis    Gastritis   Gastric hypersecretion   Gastric ulcer   Gastroesophageal reflux disease   Helicobacter pylori gastrointestinal tract infection   Ulcer of duodenum   Drug-induced peptic ulcer   Esophageal stricture   Gastrointestinal hemorrhage   Indigestion   Stress ulcer   Zollinger-Potter syndrome   Jennings s  esophagus   Laryngopharyngeal reflux   Epigastric Pain   Morbid Obesity   Cough   History of Peptic Ulcer   Esophageal Atresia, Stenosis and Fistula   Cystic Fibrosis  Bronchiectasis          Passed - Patient is age 18 or older             Last Prescription Date:   9/23/24  Last Fill Qty/Refills:         90, R-3    Last Office Visit:              9/23/24   Future Office visit:           None     Prescription approved per [facility] Refill Protocol.  Charla Cleveland RN on 7/29/2025 at 4:49 PM

## 2025-07-29 RX ORDER — OMEPRAZOLE 20 MG/1
20 CAPSULE, DELAYED RELEASE ORAL DAILY
Qty: 90 CAPSULE | Refills: 0 | Status: SHIPPED | OUTPATIENT
Start: 2025-07-29

## (undated) DEVICE — Device

## (undated) DEVICE — FORCEP BIOPSY RADIAL JAW 4 BRONCH M00515180

## (undated) DEVICE — ENDO BRUSH CHANNEL MASTER CLEANING 2-4.2MM BW-412T

## (undated) DEVICE — ENDO FORCEP ENDOJAW BIOPSY 2.8MMX230CM FB-220U

## (undated) DEVICE — SYR 50ML LL W/O NDL 309653

## (undated) DEVICE — TUBING SUCTION 10'X3/16" N510

## (undated) DEVICE — ENDO KIT COMPLIANCE DYKENDOCMPLY

## (undated) DEVICE — SUCTION MANIFOLD NEPTUNE 2 SYS 4 PORT 0702-020-000

## (undated) DEVICE — SOL WATER 1500ML

## (undated) DEVICE — ENDO BITE BLOCK 60 MAXI LF 00712804

## (undated) DEVICE — ENDO SNARE POLYPECTOMY OVAL 15MM LOOP SD-240U-15

## (undated) DEVICE — ESU GROUND PAD ADULT W/CORD E7507

## (undated) RX ORDER — SODIUM CHLORIDE, SODIUM LACTATE, POTASSIUM CHLORIDE, CALCIUM CHLORIDE 600; 310; 30; 20 MG/100ML; MG/100ML; MG/100ML; MG/100ML
INJECTION, SOLUTION INTRAVENOUS
Status: DISPENSED
Start: 2024-09-23

## (undated) RX ORDER — PROPOFOL 10 MG/ML
INJECTION, EMULSION INTRAVENOUS
Status: DISPENSED
Start: 2024-09-23

## (undated) RX ORDER — PROPOFOL 10 MG/ML
INJECTION, EMULSION INTRAVENOUS
Status: DISPENSED
Start: 2019-08-26

## (undated) RX ORDER — IBUPROFEN 200 MG
TABLET ORAL
Status: DISPENSED
Start: 2019-08-26

## (undated) RX ORDER — LIDOCAINE HYDROCHLORIDE 20 MG/ML
INJECTION, SOLUTION EPIDURAL; INFILTRATION; INTRACAUDAL; PERINEURAL
Status: DISPENSED
Start: 2019-07-29

## (undated) RX ORDER — PROPOFOL 10 MG/ML
INJECTION, EMULSION INTRAVENOUS
Status: DISPENSED
Start: 2019-07-29

## (undated) RX ORDER — GLYCOPYRROLATE 0.2 MG/ML
INJECTION, SOLUTION INTRAMUSCULAR; INTRAVENOUS
Status: DISPENSED
Start: 2024-09-23

## (undated) RX ORDER — LIDOCAINE HYDROCHLORIDE 20 MG/ML
INJECTION, SOLUTION EPIDURAL; INFILTRATION; INTRACAUDAL; PERINEURAL
Status: DISPENSED
Start: 2019-08-26